# Patient Record
Sex: FEMALE | HISPANIC OR LATINO | Employment: UNEMPLOYED | URBAN - METROPOLITAN AREA
[De-identification: names, ages, dates, MRNs, and addresses within clinical notes are randomized per-mention and may not be internally consistent; named-entity substitution may affect disease eponyms.]

---

## 2017-01-01 ENCOUNTER — APPOINTMENT (OUTPATIENT)
Dept: OCCUPATIONAL THERAPY | Facility: CLINIC | Age: 27
DRG: 292 | End: 2017-01-01
Attending: NURSE PRACTITIONER
Payer: COMMERCIAL

## 2017-01-02 ENCOUNTER — APPOINTMENT (OUTPATIENT)
Dept: OCCUPATIONAL THERAPY | Facility: CLINIC | Age: 27
DRG: 292 | End: 2017-01-02
Attending: INTERNAL MEDICINE
Payer: COMMERCIAL

## 2017-01-03 ENCOUNTER — TELEPHONE (OUTPATIENT)
Dept: PHARMACY | Facility: OTHER | Age: 27
End: 2017-01-03

## 2017-01-03 ENCOUNTER — CARE COORDINATION (OUTPATIENT)
Dept: CARE COORDINATION | Facility: CLINIC | Age: 27
End: 2017-01-03

## 2017-01-03 NOTE — TELEPHONE ENCOUNTER
MTM referral from: Transitions of Care (recent hospital discharge or ED visit)    MTM referral outreach attempt #1 on January 3, 2017 at 11:37 AM      Outcome: Left Message    Jessica Espinoza MTM Coordinator

## 2017-01-04 ENCOUNTER — PRE VISIT (OUTPATIENT)
Dept: CARDIOLOGY | Facility: CLINIC | Age: 27
End: 2017-01-04

## 2017-01-04 ENCOUNTER — CARE COORDINATION (OUTPATIENT)
Dept: CARDIOLOGY | Facility: CLINIC | Age: 27
End: 2017-01-04

## 2017-01-04 DIAGNOSIS — I50.22 CHRONIC SYSTOLIC HEART FAILURE (H): Primary | ICD-10-CM

## 2017-01-04 NOTE — TELEPHONE ENCOUNTER
MTM referral from: Transitions of Care (recent hospital discharge or ED visit)    MTM referral outreach attempt #2 on January 4, 2017 at 11:21 AM      Outcome: Patient not reachable after several attempts, will route to MTM Pharmacist/Provider as an FYI. Thank you for the referral.    Jessica Espinoza MTM Coordinator

## 2017-01-04 NOTE — PROGRESS NOTES
"I called Anne this morning to follow-up with her post-hospitalization discharged from [facility] 1/2/16.     I spoke with Anne's mom and caregiver Maritza. Maritza states she is doing well, weight is stable at 153.6lbs. Denies SOB, but states she's \"tired and crabby.\" We scheduled a Return CORE appt for Anne with labs.     Medication Review:  I reviewed all Anne's discharge medications with Maritza and she had no questions regarding her medications.     Follow-up appointment review:  I reviewed Anne Oas follow-up appointments with Maritza and she verbalized understanding. I reviewed the CORE clinic's phone number and to call with any increase of SOB, increased edema or swelling, and weight gain. Maritza verbalizes understanding and agrees with plan of care. Natalie Gaston RN    "

## 2017-01-05 ENCOUNTER — CARE COORDINATION (OUTPATIENT)
Dept: CARDIOLOGY | Facility: CLINIC | Age: 27
End: 2017-01-05

## 2017-01-05 DIAGNOSIS — I26.99 PE (PULMONARY THROMBOEMBOLISM) (H): Primary | ICD-10-CM

## 2017-01-05 NOTE — PROGRESS NOTES
Mother calling in and requesting an INR be done prior to visit in cardiology tomorrow. Order placed.

## 2017-01-06 ENCOUNTER — HOSPITAL ENCOUNTER (INPATIENT)
Facility: CLINIC | Age: 27
LOS: 14 days | Discharge: ANOTHER HOSPITAL | DRG: 286 | End: 2017-01-20
Attending: INTERNAL MEDICINE | Admitting: INTERNAL MEDICINE
Payer: COMMERCIAL

## 2017-01-06 ENCOUNTER — HOSPITAL ENCOUNTER (EMERGENCY)
Facility: CLINIC | Age: 27
End: 2017-01-06
Payer: COMMERCIAL

## 2017-01-06 ENCOUNTER — DOCUMENTATION ONLY (OUTPATIENT)
Dept: CARDIOLOGY | Facility: CLINIC | Age: 27
End: 2017-01-06

## 2017-01-06 ENCOUNTER — OFFICE VISIT (OUTPATIENT)
Dept: CARDIOLOGY | Facility: CLINIC | Age: 27
End: 2017-01-06
Attending: NURSE PRACTITIONER
Payer: COMMERCIAL

## 2017-01-06 ENCOUNTER — APPOINTMENT (OUTPATIENT)
Dept: GENERAL RADIOLOGY | Facility: CLINIC | Age: 27
DRG: 286 | End: 2017-01-06
Attending: INTERNAL MEDICINE
Payer: COMMERCIAL

## 2017-01-06 VITALS
BODY MASS INDEX: 28.17 KG/M2 | SYSTOLIC BLOOD PRESSURE: 106 MMHG | HEART RATE: 103 BPM | WEIGHT: 159 LBS | DIASTOLIC BLOOD PRESSURE: 73 MMHG | HEIGHT: 63 IN | OXYGEN SATURATION: 99 %

## 2017-01-06 DIAGNOSIS — I50.22 CHRONIC SYSTOLIC HEART FAILURE (H): ICD-10-CM

## 2017-01-06 DIAGNOSIS — K21.9 GASTROESOPHAGEAL REFLUX DISEASE, ESOPHAGITIS PRESENCE NOT SPECIFIED: ICD-10-CM

## 2017-01-06 DIAGNOSIS — I26.99 PE (PULMONARY THROMBOEMBOLISM) (H): ICD-10-CM

## 2017-01-06 DIAGNOSIS — I50.22 CHRONIC SYSTOLIC HEART FAILURE (H): Primary | ICD-10-CM

## 2017-01-06 DIAGNOSIS — I42.8 NONISCHEMIC CARDIOMYOPATHY (H): ICD-10-CM

## 2017-01-06 DIAGNOSIS — M62.838 MUSCLE SPASM: ICD-10-CM

## 2017-01-06 DIAGNOSIS — R57.0 CARDIOGENIC SHOCK (H): Primary | ICD-10-CM

## 2017-01-06 LAB
ALBUMIN SERPL-MCNC: 3.9 G/DL (ref 3.4–5)
ALBUMIN SERPL-MCNC: 3.9 G/DL (ref 3.4–5)
ALP SERPL-CCNC: 73 U/L (ref 40–150)
ALP SERPL-CCNC: 74 U/L (ref 40–150)
ALT SERPL W P-5'-P-CCNC: 202 U/L (ref 0–50)
ALT SERPL W P-5'-P-CCNC: 213 U/L (ref 0–50)
ANION GAP SERPL CALCULATED.3IONS-SCNC: 10 MMOL/L (ref 3–14)
ANION GAP SERPL CALCULATED.3IONS-SCNC: 12 MMOL/L (ref 3–14)
AST SERPL W P-5'-P-CCNC: 191 U/L (ref 0–45)
AST SERPL W P-5'-P-CCNC: 203 U/L (ref 0–45)
BASE EXCESS BLDA CALC-SCNC: 0 MMOL/L
BILIRUB DIRECT SERPL-MCNC: 0.3 MG/DL (ref 0–0.2)
BILIRUB DIRECT SERPL-MCNC: 0.4 MG/DL (ref 0–0.2)
BILIRUB SERPL-MCNC: 0.8 MG/DL (ref 0.2–1.3)
BILIRUB SERPL-MCNC: 0.9 MG/DL (ref 0.2–1.3)
BUN SERPL-MCNC: 28 MG/DL (ref 7–30)
BUN SERPL-MCNC: 29 MG/DL (ref 7–30)
CALCIUM SERPL-MCNC: 9 MG/DL (ref 8.5–10.1)
CALCIUM SERPL-MCNC: 9.3 MG/DL (ref 8.5–10.1)
CHLORIDE SERPL-SCNC: 100 MMOL/L (ref 94–109)
CHLORIDE SERPL-SCNC: 99 MMOL/L (ref 94–109)
CO2 SERPL-SCNC: 22 MMOL/L (ref 20–32)
CO2 SERPL-SCNC: 24 MMOL/L (ref 20–32)
CREAT SERPL-MCNC: 1.29 MG/DL (ref 0.52–1.04)
CREAT SERPL-MCNC: 1.34 MG/DL (ref 0.52–1.04)
ERYTHROCYTE [DISTWIDTH] IN BLOOD BY AUTOMATED COUNT: 15.9 % (ref 10–15)
GFR SERPL CREATININE-BSD FRML MDRD: 48 ML/MIN/1.7M2
GFR SERPL CREATININE-BSD FRML MDRD: 51 ML/MIN/1.7M2
GLUCOSE SERPL-MCNC: 101 MG/DL (ref 70–99)
GLUCOSE SERPL-MCNC: 112 MG/DL (ref 70–99)
HCO3 BLD-SCNC: 24 MMOL/L (ref 21–28)
HCT VFR BLD AUTO: 37.8 % (ref 35–47)
HGB BLD-MCNC: 11.8 G/DL (ref 11.7–15.7)
INR PPP: 3.32 (ref 0.86–1.14)
LACTATE BLD-SCNC: 1.4 MMOL/L (ref 0.7–2.1)
LIPASE SERPL-CCNC: 49 U/L (ref 73–393)
MAGNESIUM SERPL-MCNC: 2 MG/DL (ref 1.6–2.3)
MAGNESIUM SERPL-MCNC: 2.1 MG/DL (ref 1.6–2.3)
MCH RBC QN AUTO: 28.9 PG (ref 26.5–33)
MCHC RBC AUTO-ENTMCNC: 31.2 G/DL (ref 31.5–36.5)
MCV RBC AUTO: 92 FL (ref 78–100)
NT-PROBNP SERPL-MCNC: 9836 PG/ML (ref 0–450)
O2/TOTAL GAS SETTING VFR VENT: 21 %
PCO2 BLD: 34 MM HG (ref 35–45)
PH BLD: 7.46 PH (ref 7.35–7.45)
PLATELET # BLD AUTO: 245 10E9/L (ref 150–450)
PO2 BLD: 90 MM HG (ref 80–105)
POTASSIUM SERPL-SCNC: 4.4 MMOL/L (ref 3.4–5.3)
POTASSIUM SERPL-SCNC: 4.5 MMOL/L (ref 3.4–5.3)
PROT SERPL-MCNC: 7.2 G/DL (ref 6.8–8.8)
PROT SERPL-MCNC: 7.5 G/DL (ref 6.8–8.8)
RBC # BLD AUTO: 4.09 10E12/L (ref 3.8–5.2)
SODIUM SERPL-SCNC: 134 MMOL/L (ref 133–144)
SODIUM SERPL-SCNC: 135 MMOL/L (ref 133–144)
WBC # BLD AUTO: 7.3 10E9/L (ref 4–11)

## 2017-01-06 PROCEDURE — 85610 PROTHROMBIN TIME: CPT | Performed by: NURSE PRACTITIONER

## 2017-01-06 PROCEDURE — 83880 ASSAY OF NATRIURETIC PEPTIDE: CPT | Performed by: INTERNAL MEDICINE

## 2017-01-06 PROCEDURE — 25000125 ZZHC RX 250: Mod: ZF

## 2017-01-06 PROCEDURE — 83690 ASSAY OF LIPASE: CPT | Performed by: INTERNAL MEDICINE

## 2017-01-06 PROCEDURE — 83605 ASSAY OF LACTIC ACID: CPT

## 2017-01-06 PROCEDURE — 80048 BASIC METABOLIC PNL TOTAL CA: CPT | Performed by: INTERNAL MEDICINE

## 2017-01-06 PROCEDURE — 80076 HEPATIC FUNCTION PANEL: CPT | Performed by: INTERNAL MEDICINE

## 2017-01-06 PROCEDURE — 25000125 ZZHC RX 250: Performed by: INTERNAL MEDICINE

## 2017-01-06 PROCEDURE — 85027 COMPLETE CBC AUTOMATED: CPT | Performed by: INTERNAL MEDICINE

## 2017-01-06 PROCEDURE — 99291 CRITICAL CARE FIRST HOUR: CPT | Mod: GC | Performed by: INTERNAL MEDICINE

## 2017-01-06 PROCEDURE — 99215 OFFICE O/P EST HI 40 MIN: CPT | Mod: ZF

## 2017-01-06 PROCEDURE — 3E033XZ INTRODUCTION OF VASOPRESSOR INTO PERIPHERAL VEIN, PERCUTANEOUS APPROACH: ICD-10-PCS | Performed by: INTERNAL MEDICINE

## 2017-01-06 PROCEDURE — 25000132 ZZH RX MED GY IP 250 OP 250 PS 637: Performed by: INTERNAL MEDICINE

## 2017-01-06 PROCEDURE — 36415 COLL VENOUS BLD VENIPUNCTURE: CPT | Performed by: NURSE PRACTITIONER

## 2017-01-06 PROCEDURE — 71010 XR CHEST PORT 1 VW: CPT

## 2017-01-06 PROCEDURE — 82803 BLOOD GASES ANY COMBINATION: CPT | Performed by: INTERNAL MEDICINE

## 2017-01-06 PROCEDURE — 83735 ASSAY OF MAGNESIUM: CPT | Performed by: INTERNAL MEDICINE

## 2017-01-06 PROCEDURE — 36415 COLL VENOUS BLD VENIPUNCTURE: CPT | Performed by: INTERNAL MEDICINE

## 2017-01-06 PROCEDURE — 96374 THER/PROPH/DIAG INJ IV PUSH: CPT | Mod: ZF

## 2017-01-06 PROCEDURE — 36415 COLL VENOUS BLD VENIPUNCTURE: CPT

## 2017-01-06 PROCEDURE — 40000275 ZZH STATISTIC RCP TIME EA 10 MIN

## 2017-01-06 PROCEDURE — 36600 WITHDRAWAL OF ARTERIAL BLOOD: CPT

## 2017-01-06 PROCEDURE — 99207 ZZC CHGS TRANSFERRED TO HOSPITAL: CPT | Mod: ZP | Performed by: NURSE PRACTITIONER

## 2017-01-06 PROCEDURE — 80048 BASIC METABOLIC PNL TOTAL CA: CPT | Performed by: NURSE PRACTITIONER

## 2017-01-06 PROCEDURE — 21400006 ZZH R&B CCU INTERMEDIATE UMMC

## 2017-01-06 PROCEDURE — 40000556 ZZH STATISTIC PERIPHERAL IV START W US GUIDANCE

## 2017-01-06 RX ORDER — SODIUM CHLORIDE 9 MG/ML
INJECTION, SOLUTION INTRAVENOUS
Status: DISCONTINUED
Start: 2017-01-06 | End: 2017-01-06 | Stop reason: HOSPADM

## 2017-01-06 RX ORDER — POTASSIUM CHLORIDE 7.45 MG/ML
10 INJECTION INTRAVENOUS
Status: DISCONTINUED | OUTPATIENT
Start: 2017-01-06 | End: 2017-01-16

## 2017-01-06 RX ORDER — WARFARIN SODIUM 1 MG/1
1 TABLET ORAL
Status: COMPLETED | OUTPATIENT
Start: 2017-01-06 | End: 2017-01-06

## 2017-01-06 RX ORDER — FERROUS SULFATE 325(65) MG
325 TABLET ORAL 2 TIMES DAILY
Status: DISCONTINUED | OUTPATIENT
Start: 2017-01-06 | End: 2017-01-20 | Stop reason: HOSPADM

## 2017-01-06 RX ORDER — ACETAMINOPHEN 325 MG/1
650 TABLET ORAL EVERY 4 HOURS PRN
Status: DISCONTINUED | OUTPATIENT
Start: 2017-01-06 | End: 2017-01-20 | Stop reason: HOSPADM

## 2017-01-06 RX ORDER — ONDANSETRON 4 MG/1
4 TABLET, ORALLY DISINTEGRATING ORAL EVERY 6 HOURS PRN
Status: DISCONTINUED | OUTPATIENT
Start: 2017-01-06 | End: 2017-01-20 | Stop reason: HOSPADM

## 2017-01-06 RX ORDER — POTASSIUM CHLORIDE 750 MG/1
20-40 TABLET, EXTENDED RELEASE ORAL
Status: DISCONTINUED | OUTPATIENT
Start: 2017-01-06 | End: 2017-01-16

## 2017-01-06 RX ORDER — SPIRONOLACTONE 25 MG/1
25 TABLET ORAL DAILY
Status: DISCONTINUED | OUTPATIENT
Start: 2017-01-07 | End: 2017-01-07

## 2017-01-06 RX ORDER — BUSPIRONE HYDROCHLORIDE 15 MG/1
30 TABLET ORAL 3 TIMES DAILY
Status: DISCONTINUED | OUTPATIENT
Start: 2017-01-06 | End: 2017-01-20 | Stop reason: HOSPADM

## 2017-01-06 RX ORDER — MAGNESIUM SULFATE HEPTAHYDRATE 40 MG/ML
4 INJECTION, SOLUTION INTRAVENOUS EVERY 4 HOURS PRN
Status: DISCONTINUED | OUTPATIENT
Start: 2017-01-06 | End: 2017-01-20 | Stop reason: HOSPADM

## 2017-01-06 RX ORDER — POTASSIUM CHLORIDE 750 MG/1
40 TABLET, EXTENDED RELEASE ORAL 2 TIMES DAILY
Status: DISCONTINUED | OUTPATIENT
Start: 2017-01-06 | End: 2017-01-07

## 2017-01-06 RX ORDER — POTASSIUM CHLORIDE 1.5 G/1.58G
20-40 POWDER, FOR SOLUTION ORAL
Status: DISCONTINUED | OUTPATIENT
Start: 2017-01-06 | End: 2017-01-16

## 2017-01-06 RX ORDER — DIGOXIN 125 MCG
125 TABLET ORAL DAILY
Status: DISCONTINUED | OUTPATIENT
Start: 2017-01-06 | End: 2017-01-13

## 2017-01-06 RX ORDER — ONDANSETRON 2 MG/ML
4 INJECTION INTRAMUSCULAR; INTRAVENOUS EVERY 6 HOURS PRN
Status: DISCONTINUED | OUTPATIENT
Start: 2017-01-06 | End: 2017-01-20 | Stop reason: HOSPADM

## 2017-01-06 RX ORDER — POLYETHYLENE GLYCOL 3350 17 G/17G
17 POWDER, FOR SOLUTION ORAL DAILY
Status: DISCONTINUED | OUTPATIENT
Start: 2017-01-06 | End: 2017-01-20 | Stop reason: HOSPADM

## 2017-01-06 RX ORDER — POTASSIUM CHLORIDE 1500 MG/1
20 TABLET, EXTENDED RELEASE ORAL 2 TIMES DAILY
Status: DISCONTINUED | OUTPATIENT
Start: 2017-01-06 | End: 2017-01-06

## 2017-01-06 RX ORDER — LANOLIN ALCOHOL/MO/W.PET/CERES
3 CREAM (GRAM) TOPICAL
Status: DISCONTINUED | OUTPATIENT
Start: 2017-01-06 | End: 2017-01-20 | Stop reason: HOSPADM

## 2017-01-06 RX ORDER — FUROSEMIDE 10 MG/ML
160 INJECTION INTRAMUSCULAR; INTRAVENOUS ONCE
Qty: 16 ML | Refills: 0 | Status: ON HOLD | OUTPATIENT
Start: 2017-01-06 | End: 2017-01-20

## 2017-01-06 RX ORDER — HYDROXYZINE HYDROCHLORIDE 50 MG/1
50 TABLET, FILM COATED ORAL 3 TIMES DAILY
Status: DISCONTINUED | OUTPATIENT
Start: 2017-01-06 | End: 2017-01-20 | Stop reason: HOSPADM

## 2017-01-06 RX ORDER — LIDOCAINE 40 MG/G
CREAM TOPICAL
Status: DISCONTINUED | OUTPATIENT
Start: 2017-01-06 | End: 2017-01-20 | Stop reason: HOSPADM

## 2017-01-06 RX ORDER — DOBUTAMINE HCL IN DEXTROSE 5 % 500MG/250
3 INTRAVENOUS SOLUTION INTRAVENOUS CONTINUOUS
Status: DISCONTINUED | OUTPATIENT
Start: 2017-01-06 | End: 2017-01-15

## 2017-01-06 RX ORDER — DULOXETIN HYDROCHLORIDE 30 MG/1
90 CAPSULE, DELAYED RELEASE ORAL DAILY
Status: DISCONTINUED | OUTPATIENT
Start: 2017-01-06 | End: 2017-01-15

## 2017-01-06 RX ORDER — ACETAMINOPHEN 650 MG/1
650 SUPPOSITORY RECTAL EVERY 4 HOURS PRN
Status: DISCONTINUED | OUTPATIENT
Start: 2017-01-06 | End: 2017-01-20 | Stop reason: HOSPADM

## 2017-01-06 RX ORDER — POTASSIUM CHLORIDE 29.8 MG/ML
20 INJECTION INTRAVENOUS
Status: DISCONTINUED | OUTPATIENT
Start: 2017-01-06 | End: 2017-01-16

## 2017-01-06 RX ADMIN — LIDOCAINE HYDROCHLORIDE 30 ML: 20 SOLUTION ORAL; TOPICAL at 22:08

## 2017-01-06 RX ADMIN — ONDANSETRON 4 MG: 2 INJECTION INTRAMUSCULAR; INTRAVENOUS at 15:03

## 2017-01-06 RX ADMIN — WARFARIN SODIUM 1 MG: 1 TABLET ORAL at 18:19

## 2017-01-06 RX ADMIN — HYDROXYZINE HYDROCHLORIDE 50 MG: 50 TABLET, FILM COATED ORAL at 19:45

## 2017-01-06 RX ADMIN — ACETAMINOPHEN 650 MG: 325 TABLET, FILM COATED ORAL at 20:28

## 2017-01-06 RX ADMIN — BUSPIRONE HYDROCHLORIDE 30 MG: 15 TABLET ORAL at 19:45

## 2017-01-06 RX ADMIN — POTASSIUM CHLORIDE 40 MEQ: 750 TABLET, EXTENDED RELEASE ORAL at 19:46

## 2017-01-06 RX ADMIN — IRON 325 MG: 65 TABLET ORAL at 19:45

## 2017-01-06 RX ADMIN — BUSPIRONE HYDROCHLORIDE 30 MG: 15 TABLET ORAL at 14:42

## 2017-01-06 RX ADMIN — HYDROXYZINE HYDROCHLORIDE 50 MG: 50 TABLET, FILM COATED ORAL at 14:42

## 2017-01-06 RX ADMIN — DOBUTAMINE 5 MCG/KG/MIN: 12.5 INJECTION, SOLUTION, CONCENTRATE INTRAVENOUS at 14:50

## 2017-01-06 RX ADMIN — DOBUTAMINE 5 MCG/KG/MIN: 12.5 INJECTION, SOLUTION, CONCENTRATE INTRAVENOUS at 17:04

## 2017-01-06 RX ADMIN — ONDANSETRON 4 MG: 4 TABLET, ORALLY DISINTEGRATING ORAL at 22:20

## 2017-01-06 ASSESSMENT — ENCOUNTER SYMPTOMS
BLOOD IN STOOL: 0
WEIGHT LOSS: 1
SPEECH CHANGE: 0
SEIZURES: 0
POSTURAL DYSPNEA: 1
HALLUCINATIONS: 0
RECTAL PAIN: 0
HYPERTENSION: 0
LEG SWELLING: 0
NAUSEA: 1
SPUTUM PRODUCTION: 0
JAUNDICE: 0
CONSTIPATION: 0
DEPRESSION: 1
EXERCISE INTOLERANCE: 0
COUGH DISTURBING SLEEP: 0
DISTURBANCES IN COORDINATION: 0
SHORTNESS OF BREATH: 1
BLOATING: 1
WEAKNESS: 1
BOWEL INCONTINENCE: 0
INCREASED ENERGY: 1
DYSURIA: 0
HEARTBURN: 0
WEIGHT GAIN: 1
HEMOPTYSIS: 0
DIZZINESS: 1
TACHYCARDIA: 0
RECTAL BLEEDING: 0
FATIGUE: 1
DECREASED CONCENTRATION: 1
INSOMNIA: 0
SNORES LOUDLY: 0
SLEEP DISTURBANCES DUE TO BREATHING: 0
POLYDIPSIA: 1
HEADACHES: 0
HYPOTENSION: 1
RESPIRATORY PAIN: 1
PALPITATIONS: 0
FLANK PAIN: 0
PANIC: 0
POLYPHAGIA: 0
DECREASED APPETITE: 0
CLAUDICATION: 0
TREMORS: 0
LEG PAIN: 0
SYNCOPE: 1
FEVER: 0
PARALYSIS: 0
WHEEZING: 0
DIARRHEA: 0
ALTERED TEMPERATURE REGULATION: 1
VOMITING: 1
ABDOMINAL PAIN: 1
NERVOUS/ANXIOUS: 1
ORTHOPNEA: 0
DIFFICULTY URINATING: 1
LIGHT-HEADEDNESS: 1
TINGLING: 1
MEMORY LOSS: 0
COUGH: 0
LOSS OF CONSCIOUSNESS: 1
DYSPNEA ON EXERTION: 1
NIGHT SWEATS: 1
CHILLS: 1
NUMBNESS: 0
HEMATURIA: 0

## 2017-01-06 ASSESSMENT — PAIN SCALES - GENERAL: PAINLEVEL: MILD PAIN (3)

## 2017-01-06 ASSESSMENT — PAIN DESCRIPTION - DESCRIPTORS
DESCRIPTORS: PRESSURE
DESCRIPTORS: CONSTANT;PRESSURE

## 2017-01-06 NOTE — LETTER
Transition Communication Hand-off for Care Transitions to Next Level of Care Provider    Name: Anne Eugene  MRN #: [id number]  Primary Care Provider: MINERVA RICHMOND     Primary Clinic: [address] [address]     Reason for Hospitalization:  Acute on chronic systolic congestive heart failure (H) [I50.23]  Admit Date/Time: 1/6/2017  1:10 PM  Discharge Date:  1/20/17  Payor Source: Payor: [employer] / Plan: [employer] PMAP / Product Type: HMO /            Discharge Plan:  Transferred to [facility] for 2nd opinion for LVAD eval.             Alonso Teixeira RN, BSN  4A and 4E/ ICU  Care Coordinator  Phone: [phone]  Pager: [phone]        AVS/Discharge Summary is the source of truth; this is a helpful guide for improved communication of patient story

## 2017-01-06 NOTE — PATIENT INSTRUCTIONS
You were seen today in the Cardiovascular Clinic at the [facility].     Cardiology Providers you saw during your visit: Terri Garsia NP        1.  We will be giving you 160mg IV lasix in clinic today thru your PICC line.  2.  Please take potassium 60mEqs three times today.  Then return to potassium 40mEqs three times daily.      If you weight is still greater than or equal to 155lbs this weekend  - please call the on On-Call Cardiologist in the Hospital    If your weight is still greater than or equal to 155lbs on Monday then take Torsemide 100mg twice daily and potassium 60mEqs three times daily.  If your weight persists despite this dosage increase then please call us.    If your weight is less than 155lbs then continue torsemide 80mg twice daily and potassium 40mEqs three times daily.         Results for MARTINA MCQUEEN (MRN [id number]) as of 1/6/2017 11:26   Ref. Range 1/6/2017 10:11   Sodium Latest Ref Range: 133-144 mmol/L 135   Potassium Latest Ref Range: 3.4-5.3 mmol/L 4.5   Chloride Latest Ref Range:  mmol/L 100   Carbon Dioxide Latest Ref Range: 20-32 mmol/L 22   Urea Nitrogen Latest Ref Range: 7-30 mg/dL 29   Creatinine Latest Ref Range: 0.52-1.04 mg/dL 1.34 (H)   GFR Estimate Latest Ref Range: >60 mL/min/1.7m2 48 (L)   GFR Estimate If Black Latest Ref Range: >60 mL/min/1.7m2 59 (L)   Calcium Latest Ref Range: 8.5-10.1 mg/dL 9.3   Anion Gap Latest Ref Range: 3-14 mmol/L 12   Glucose Latest Ref Range: 70-99 mg/dL 112 (H)   INR Latest Ref Range: 0.86-1.14  3.32 (H)         Please limit your fluid intake to 2 L (64 ounces) daily.  2 Liters a day = 8.5 cups, or 72 ounces.  Please limit your salt intake to 2 grams a day or less.    If you gain 2# in 24 hours or 5# in one week call Natalie Gaston RN so we can adjust your medications as needed over the phone.    Please feel free to call me with any questions or concerns.      Natalie Gaston RN BSN CHFN  [facility]  "[facility]  Cardiology Care Coordinator-Heart Failure Clinic    Questions and [phone].   First press #1 for the University and then press #3 for \"Medical Questions\" to reach us Cardiology Nurses.     On Call Cardiologist for after hours or on weekends: [phone]   option #4 and ask to speak to the on-call Cardiologist. Inform them you are a CORE/heart failure patient at the [place].        If you need a medication refill please contact your pharmacy.  Please allow 3 business days for your refill to be completed.  _______________________________________________________  C.O.R.E[facility] CLINIC Cardiomyopathy, Optimization, Rehabilitation, Education   The C.O.R.E. CLINIC is a heart failure specialty clinic within the [facility] where you will work with specialized nurse practioners dedicated to helping patients with heart failure carefully adjust medications, receive education, and learn who and when to call if symptoms develop. They specialize in helping you better understand your condition, slow the progression of your disease, improve the length and quality of your life, help you detect future heart problems before they become life threatening, and avoid hospitalizations.  As always, thank you for trusting us with your health care needs!       "

## 2017-01-06 NOTE — NURSING NOTE
Chief Complaint   Patient presents with     Follow Up For     Return CORE appt; 26yr old female with a h/o chronic systolic heart failure presenting post hospitalization for follow up and labs prior

## 2017-01-06 NOTE — IP AVS SNAPSHOT
` ` Patient Information     Patient Name Sex     Anne Eugene ([id number]) Female 1990       Room Bed    [facility] 4415-08      Patient Demographics     Address Phone    [address] [place] [address] [phone] (Home) *Preferred*      Patient Ethnicity & Race     Ethnic Group Patient Race     /  White      Emergency Contact(s)     Name Relation Home Work Mobile    Maritza Eugene Mother   [phone]      Documents on File        Status Date Received Description       Documents for the Patient    Consent for EHR Access Received 16     Insurance Card       External Medication Information Consent       Patient ID       [facility] Specified Other       Consent for Services/Privacy Notice - Hospital/Clinic Received 16     Privacy Notice - [place] Received 16     HIM BUDDY Authorization  16     HIM BUDDY Authorization  16 [facility] - 16    HIM BUDDY Authorization - File Only  16 AUTHORIZATION FOR RELEASE OF PROTECTED HEALTH INFORMATION    Consent for Services - [facility]       Consent for Services/Privacy Notice - Hospital/Clinic-Esign       HIM BUDDY Authorization  10/17/16 [facility]    HIM BUDDY Authorization  16 [employer]    HIM BUDDY Authorization  16 [employer]    HIM BUDDY Authorization  16 [place] [facility]    HIM BUDDY Authorization  16 [facility]    HIM BUDDY Authorization  16 [facility]     HIM BUDDY Authorization  16 [employer]    HIM BUDDY Authorization  17 [place] [facility]    HIM UBDDY Authorization - File Only  17 [facility]    HIM BUDDY Authorization - File Only  17 [facility]       Documents for the Encounter    Monitoring Device Output  17 INITIAL MONITORING STRIPS    Monitoring Device Output  17 MONITORING STRIPS SHEET 2    Monitoring Device Output  17 MONITORING STRIPS SHEET 2    Monitoring Device Output  17 SAVED EVENT  REPORT      Admission Information     Attending Provider Admitting Provider Admission Type Admission Date/Time    Elvira Storey MD Kamdar, Forum D, MD Urgent 01/06/17  1310    Discharge Date Hospital Service Auth/Cert Status Service Area     Cardiology Incomplete [facility]    Unit Room/Bed Admission Status    [facility] MEDICAL ICU 4415/4415-01 Admission (Confirmed)            Admission     Complaint    Acute heart failure, Heart failure (H)      Hospital Account     Name Acct ID Class Status Primary Coverage    Anne Eugene [id number] Inpatient Open [employer] - [employer] PMAP            Guarantor Account (for Hospital Account #[id number])     Name Relation to Pt Service Area Active? Acct Type    Anne Eugene Self FCS Yes Personal/Family    Address Phone          [address] [place] [address] [phone](H)              Coverage Information (for Hospital Account #[id number])     F/O Payor/Plan Precert #    [employer] PMAP     Subscriber Subscriber #    Anne Eugene [id number]    Address Phone    ATTN CLAIMS PROCESSING  [address] [address]  [place] [id number] [phone]

## 2017-01-06 NOTE — IP AVS SNAPSHOT
` `           UNIT 4C [facility] BANK: [phone]            Medication Administration Report for Anne Eugene as of 01/20/17 1239   Legend:    Given Hold Not Given Due Canceled Entry Other Actions    Time Time (Time) Time  Time-Action       Inactive    Active    Linked        Medications 01/14/17 01/15/17 01/16/17 01/17/17 01/18/17 01/19/17 01/20/17    acetaminophen (TYLENOL) Suppository 650 mg  Dose: 650 mg Freq: EVERY 4 HOURS PRN Route: RE  PRN Reason: mild pain  Start: 01/06/17 1339   Admin Instructions: Not to exceed 4 grams/day.  Maximum acetaminophen dose from all sources = 75 mg/kg/day not to exceed 4 grams/day.               acetaminophen (TYLENOL) tablet 650 mg  Dose: 650 mg Freq: EVERY 4 HOURS PRN Route: PO  PRN Reason: mild pain  Start: 01/06/17 1339   Admin Instructions: Maximum acetaminophen dose from all sources = 75 mg/kg/day not to exceed 4 grams/day.       1126 (650 mg)-Given       2353 (650 mg)-Given         0108 (650 mg)-Given       1041 (650 mg)-Given [C]       1908 (650 mg)-Given        0341 (650 mg)-Given       1024 (650 mg)-Given       1626 (650 mg)-Given            bumetanide (BUMEX) 0.25 mg/mL infusion  Rate: 6 mL/hr Freq: CONTINUOUS Route: IV  Last Dose: 1.5 mg/hr (01/20/17 1100)  Start: 01/18/17 1145        1203 (1 mg/hr)-New Bag       1300 (1 mg/hr)-Rate/Dose Verify       1400 (1 mg/hr)-Rate/Dose Verify       1500 (1 mg/hr)-Rate/Dose Verify       1600 (1 mg/hr)-Rate/Dose Verify       1700-Stopped [C]       2321 (1 mg/hr)-Restarted        0000 (1 mg/hr)-Rate/Dose Verify       0100 (1 mg/hr)-Rate/Dose Verify       0200 (1 mg/hr)-Rate/Dose Verify       0300 (1 mg/hr)-Rate/Dose Verify       0400 (1 mg/hr)-Rate/Dose Verify       0500 (1 mg/hr)-Rate/Dose Verify       0600 (1 mg/hr)-Rate/Dose Verify       0700 (1 mg/hr)-Rate/Dose Verify       0800 (1 mg/hr)-Rate/Dose Verify       0900 (1 mg/hr)-Rate/Dose Verify       1000 (1 mg/hr)-Rate/Dose Verify       1100 (1 mg/hr)-Rate/Dose Verify        1200 (1 mg/hr)-Rate/Dose Verify       1300 (1 mg/hr)-Rate/Dose Verify       1400 (1 mg/hr)-Rate/Dose Verify       1500 (1 mg/hr)-Rate/Dose Verify       1501 (1 mg/hr)-New Bag       1600 (1 mg/hr)-Rate/Dose Verify       1632 (1.5 mg/hr)-Rate/Dose Change       1700 (1.5 mg/hr)-Rate/Dose Verify       1800 (1.5 mg/hr)-Rate/Dose Verify       1900 (1.5 mg/hr)-Rate/Dose Verify       2000 (1.5 mg/hr)-Rate/Dose Verify       2100 (1.5 mg/hr)-Rate/Dose Verify       2200 (1.5 mg/hr)-Rate/Dose Verify       2300 (1.5 mg/hr)-Rate/Dose Verify        0000 (1.5 mg/hr)-Rate/Dose Verify       0100 (1.5 mg/hr)-Rate/Dose Verify       0200 (1.5 mg/hr)-Rate/Dose Verify       0300 (1.5 mg/hr)-Rate/Dose Verify       0400 (1.5 mg/hr)-Rate/Dose Verify       0500 (1.5 mg/hr)-Rate/Dose Verify       0600 (1.5 mg/hr)-Rate/Dose Verify       0700 (1.5 mg/hr)-Rate/Dose Verify       0800 (1.5 mg/hr)-Rate/Dose Verify       0843 (1.5 mg/hr)-New Bag       0900 (1.5 mg/hr)-Rate/Dose Verify       1000 (1.5 mg/hr)-Rate/Dose Verify       1100 (1.5 mg/hr)-Rate/Dose Verify           busPIRone (BUSPAR) tablet 30 mg  Dose: 30 mg Freq: 3 TIMES DAILY Route: PO  Start: 01/06/17 1400    0841 (30 mg)-Given       1554 (30 mg)-Given [C]       1930 (30 mg)-Given        0854 (30 mg)-Given       1425 (30 mg)-Given       2043 (30 mg)-Given        0933 (30 mg)-Given [C]       1317 (30 mg)-Given       2025 (30 mg)-Given        0736 (30 mg)-Given       1332 (30 mg)-Given       1954 (30 mg)-Given        1006 (30 mg)-Given       1634 (30 mg)-Given       1935 (30 mg)-Given        0748 (30 mg)-Given       1443 (30 mg)-Given       1937 (30 mg)-Given        0839 (30 mg)-Given       [ ] 1400       [ ] 2000           cyclobenzaprine (FLEXERIL) tablet 10 mg  Dose: 10 mg Freq: 3 TIMES DAILY PRN Route: PO  PRN Reason: muscle spasms  Start: 01/19/17 1542         1626 (10 mg)-Given            digoxin (LANOXIN) half-tab 62.5 mcg  Dose: 62.5 mcg Freq: DAILY Route: PO  Start: 01/14/17 0800     0842 (62.5 mcg)-Given        0856 (62.5 mcg)-Given        0933 (62.5 mcg)-Given [C]        0737 (62.5 mcg)-Given        1007 (62.5 mcg)-Given        1024 (62.5 mcg)-Given        0841 (62.5 mcg)-Given           DOBUTamine (DOBUTREX) 1,000 mg in D5W 250 mL infusion (adult max conc)  Rate: 8.2 mL/hr Freq: CONTINUOUS Route: IV  Last Dose: 7.5 mcg/kg/min (01/20/17 1100)  Start: 01/16/17 0945      0936 (5 mcg/kg/min)-New Bag       1000 (5 mcg/kg/min)-Rate/Dose Verify       1100 (5 mcg/kg/min)-Rate/Dose Verify       1200 (5 mcg/kg/min)-Rate/Dose Verify       1300 (5 mcg/kg/min)-Rate/Dose Verify       1400 (5 mcg/kg/min)-Rate/Dose Verify       1500 (5 mcg/kg/min)-Rate/Dose Verify       1600 (5 mcg/kg/min)-Rate/Dose Verify       1700 (5 mcg/kg/min)-Rate/Dose Verify       1730 (5 mcg/kg/min)-Rate/Dose Verify       1800 (5 mcg/kg/min)-Rate/Dose Verify       2034 (5 mcg/kg/min)-Rate/Dose Verify       2100 (5 mcg/kg/min)-Rate/Dose Verify       2200 (5 mcg/kg/min)-Rate/Dose Verify       2300 (5 mcg/kg/min)-Rate/Dose Verify        0000 (5 mcg/kg/min)-Rate/Dose Verify       0047 (6 mcg/kg/min)-Rate/Dose Change       0100 (6 mcg/kg/min)-Rate/Dose Verify       0200 (6 mcg/kg/min)-Rate/Dose Verify       0300 (6 mcg/kg/min)-Rate/Dose Verify       0400 (6 mcg/kg/min)-Rate/Dose Verify       0500 (6 mcg/kg/min)-Rate/Dose Verify       0600 (6 mcg/kg/min)-Rate/Dose Verify       0700 (6 mcg/kg/min)-Rate/Dose Verify       0800 (6 mcg/kg/min)-Rate/Dose Verify       0900 (6 mcg/kg/min)-Rate/Dose Verify       1000 (6 mcg/kg/min)-Rate/Dose Verify       1100 (6 mcg/kg/min)-Rate/Dose Verify       1200 (6 mcg/kg/min)-Rate/Dose Verify       1235 (6 mcg/kg/min)-New Bag       1300 (6 mcg/kg/min)-Rate/Dose Verify       1400 (6 mcg/kg/min)-Rate/Dose Verify       1500 (6 mcg/kg/min)-Rate/Dose Verify       1600 (6 mcg/kg/min)-Rate/Dose Verify       1700 (6 mcg/kg/min)-Rate/Dose Verify       1800 (6 mcg/kg/min)-Rate/Dose Verify       1900 (6  mcg/kg/min)-Rate/Dose Verify       2000 (6 mcg/kg/min)-Rate/Dose Verify       2100 (6 mcg/kg/min)-Rate/Dose Verify       2200 (6 mcg/kg/min)-Rate/Dose Verify       2300 (6 mcg/kg/min)-Rate/Dose Verify        0000 (6 mcg/kg/min)-Rate/Dose Verify       0100 (6 mcg/kg/min)-Rate/Dose Verify       0200 (6 mcg/kg/min)-Rate/Dose Verify       0300 (6 mcg/kg/min)-Rate/Dose Verify       0400 (6 mcg/kg/min)-Rate/Dose Verify       0500 (6 mcg/kg/min)-Rate/Dose Verify       0600 (6 mcg/kg/min)-Rate/Dose Verify       0700 (6 mcg/kg/min)-Rate/Dose Verify       0800 (6 mcg/kg/min)-Rate/Dose Verify       0900 (6 mcg/kg/min)-Rate/Dose Verify       0939 (7.5 mcg/kg/min)-Rate/Dose Change       1000 (7.5 mcg/kg/min)-Rate/Dose Verify       1100 (7.5 mcg/kg/min)-Rate/Dose Verify       1200 (7.5 mcg/kg/min)-Rate/Dose Verify       1300 (7.5 mcg/kg/min)-Rate/Dose Verify       1400 (7.5 mcg/kg/min)-Rate/Dose Verify       1510 (5 mcg/kg/min)-Rate/Dose Change [C]       1600 (5 mcg/kg/min)-Rate/Dose Verify       1700 (5 mcg/kg/min)-Rate/Dose Verify       1800 (5 mcg/kg/min)-Rate/Dose Verify       1900 (5 mcg/kg/min)-Rate/Dose Verify       1940 (5 mcg/kg/min)-New Bag       2000 (5 mcg/kg/min)-Rate/Dose Verify       2100 (5 mcg/kg/min)-Rate/Dose Verify       2200 (5 mcg/kg/min)-Rate/Dose Verify       2300 (5 mcg/kg/min)-Rate/Dose Verify        0000 (5 mcg/kg/min)-Rate/Dose Verify       0100 (5 mcg/kg/min)-Rate/Dose Verify       0200 (5 mcg/kg/min)-Rate/Dose Verify       0300 (5 mcg/kg/min)-Rate/Dose Verify       0400 (5 mcg/kg/min)-Rate/Dose Verify       0500 (5 mcg/kg/min)-Rate/Dose Verify       0600 (5 mcg/kg/min)-Rate/Dose Verify       0655 (7.5 mcg/kg/min)-Rate/Dose Change       0700 (7.5 mcg/kg/min)-Rate/Dose Verify       0800 (7.5 mcg/kg/min)-Rate/Dose Verify       0900 (7.5 mcg/kg/min)-Rate/Dose Verify       1000 (7.5 mcg/kg/min)-Rate/Dose Verify       1100 (7.5 mcg/kg/min)-Rate/Dose Verify       1200 (7.5 mcg/kg/min)-Rate/Dose Verify        1300 (7.5 mcg/kg/min)-Rate/Dose Verify       1400 (7.5 mcg/kg/min)-Rate/Dose Verify       1500 (7.5 mcg/kg/min)-Rate/Dose Verify       1600 (7.5 mcg/kg/min)-Rate/Dose Verify       1656 (7.5 mcg/kg/min)-New Bag       1700 (7.5 mcg/kg/min)-Rate/Dose Verify       1800 (7.5 mcg/kg/min)-Rate/Dose Verify       1900 (7.5 mcg/kg/min)-Rate/Dose Verify       2000 (7.5 mcg/kg/min)-Rate/Dose Verify       2100 (7.5 mcg/kg/min)-Rate/Dose Verify       2200 (7.5 mcg/kg/min)-Rate/Dose Verify       2300 (7.5 mcg/kg/min)-Rate/Dose Verify        0000 (7.5 mcg/kg/min)-Rate/Dose Verify       0100 (7.5 mcg/kg/min)-Rate/Dose Verify       0200 (7.5 mcg/kg/min)-Rate/Dose Verify       0300 (7.5 mcg/kg/min)-Rate/Dose Verify       0400 (7.5 mcg/kg/min)-Rate/Dose Verify       0500 (7.5 mcg/kg/min)-Rate/Dose Verify       0600 (7.5 mcg/kg/min)-Rate/Dose Verify       0700 (7.5 mcg/kg/min)-Rate/Dose Verify       0800 (7.5 mcg/kg/min)-Rate/Dose Verify       0900 (7.5 mcg/kg/min)-Rate/Dose Verify       1000 (7.5 mcg/kg/min)-Rate/Dose Verify       1007 (7.5 mcg/kg/min)-New Bag       1100 (7.5 mcg/kg/min)-Rate/Dose Verify           DULoxetine (CYMBALTA) EC capsule 120 mg  Dose: 120 mg Freq: DAILY Route: ORAL OR FEED  Start: 01/16/17 0800      0932 (120 mg)-Given [C]        0736 (120 mg)-Given        1007 (120 mg)-Given        0749 (120 mg)-Given        0837 (120 mg)-Given           ferrous sulfate (IRON) tablet 325 mg  Dose: 325 mg Freq: 2 TIMES DAILY Route: PO  Start: 01/06/17 2000   Admin Instructions: Absorbed best on an empty stomach. If stomach upset occurs, can take with meals.     0842 (325 mg)-Given       1930 (325 mg)-Given        0855 (325 mg)-Given       2044 (325 mg)-Given        0931 (325 mg)-Given [C]       2026 (325 mg)-Given        0736 (325 mg)-Given       1954 (325 mg)-Given        1006 (325 mg)-Given       1935 (325 mg)-Given        0748 (325 mg)-Given       1937 (325 mg)-Given        0839 (325 mg)-Given       [ ] 2000            "granisetron (KYTRIL) injection 1 mg  Dose: 1 mg Freq: EVERY 12 HOURS PRN Route: IV  PRN Reason: other  PRN Comment: headache  Start: 01/19/17 2103         2235 (1 mg)-Given            hydrALAZINE (APRESOLINE) tablet 100 mg  Dose: 100 mg Freq: 4 TIMES DAILY Route: PO  Start: 01/19/17 1630         (1639)-Not Given [C]       1938 (100 mg)-Given        0840 (100 mg)-Given       1224 (100 mg)-Given       [ ] 1600       [ ] 2000           hydrOXYzine (ATARAX) tablet 50 mg  Dose: 50 mg Freq: 3 TIMES DAILY Route: PO  Start: 01/06/17 1400    0842 (50 mg)-Given       1554 (50 mg)-Given [C]       1930 (50 mg)-Given        0855 (50 mg)-Given       1425 (50 mg)-Given       2211 (50 mg)-Given        0931 (50 mg)-Given [C]       1317 (50 mg)-Given       2026 (50 mg)-Given        0737 (50 mg)-Given       1332 (50 mg)-Given       1954 (50 mg)-Given        1005 (50 mg)-Given       1634 (50 mg)-Given       1935 (50 mg)-Given        0748 (50 mg)-Given       1443 (50 mg)-Given       1938 (50 mg)-Given        0839 (50 mg)-Given       [ ] 1400       [ ] 2000           isosorbide dinitrate (ISORDIL) tablet 30 mg  Dose: 30 mg Freq: 3 TIMES DAILY Route: PO  Start: 01/19/17 2000   Admin Instructions: Recommended to take on empty stomach.          1937 (30 mg)-Given        0838 (30 mg)-Given       [ ] 1400       [ ] 2000           lidocaine (LMX4) kit  Freq: EVERY 1 HOUR PRN Route: Top  PRN Reason: mild pain  PRN Comment: with VAD insertion or accessing implanted port,  Start: 01/06/17 1339   Admin Instructions: Do NOT give if patient has a history of allergy to any local anesthetic or any \"rama\" product.   Apply 30 min prior to VAD insertion or port access.  MAX Dose:  2.5 gm (  of 5 gm tube)               lidocaine (XYLOCAINE) 2 % 15 mL, alum & mag hydroxide-simethicone (MYLANTA ES/MAALOX  ES) 15 mL GI Cocktail  Dose: 30 mL Freq: 3 TIMES DAILY PRN Route: PO  PRN Reason: moderate pain  Start: 01/08/17 1249              magnesium sulfate 2 g " in NS intermittent infusion ([facility] or [facility] Cmpd)  Dose: 2 g Freq: DAILY PRN Route: IV  PRN Reason: magnesium supplementation  Start: 01/06/17 1454   Admin Instructions: For Serum Mg++ 1.6 - 2 mg/dL  Give 2 g and recheck magnesium level next AM.     0520 (2 g)-New Bag        1813 (2 g)-New Bag        0608 (2 g)-New Bag         1518 (2 g)-New Bag             magnesium sulfate 4 g in 100 mL sterile water (premade)  Dose: 4 g Freq: EVERY 4 HOURS PRN Route: IV  PRN Reason: magnesium supplementation  Start: 01/06/17 1454   Admin Instructions: For serum Mg++ less than 1.6 mg/dL  Give 4 g and recheck magnesium level 2 hours after dose, and next AM.               medication instruction  Freq: CONTINUOUS PRN Route: XX  Start: 01/06/17 1339   Admin Instructions: No IV fluids               melatonin tablet 3 mg  Dose: 3 mg Freq: AT BEDTIME PRN Route: PO  PRN Reason: sleep  Start: 01/06/17 1337              multivitamin, therapeutic with minerals (THERA-VIT-M) tablet 1 tablet  Dose: 1 tablet Freq: DAILY Route: PO  Start: 01/20/17 1030          1224 (1 tablet)-Given           naloxone (NARCAN) injection 0.1-0.4 mg  Dose: 0.1-0.4 mg Freq: EVERY 2 MIN PRN Route: IV  PRN Reason: opioid reversal  Start: 01/09/17 8349   Admin Instructions: For respiratory rate LESS than or EQUAL to 8.  Partial reversal dose:  0.1 mg titrated q 2 minutes for Analgesia Side Effects Monitoring Sedation Level of 3 (frequently drowsy, arousable, drifts to sleep during conversation).Full reversal dose:  0.4 mg bolus for Analgesia Side Effects Monitoring Sedation Level of 4 (somnolent, minimal or no response to stimulation).               nitroprusside (NIPRIDE) 50 mg, sodium thiosulfate 500 mg in D5W 125 mL infusion (conc: 0.4mg/mL)  Rate: 5.5 mL/hr Freq: CONTINUOUS Route: IV  Last Dose: 0.5 mcg/kg/min (01/20/17 1100)  Start: 01/13/17 1830   Admin Instructions: For range orders: start at lowest dose ordered. Titrate by 0.25 to 0.5 mcg/kg/min every 5  minutes to keep  Cardiac index greater than 2.  Conc: 0.4 mg/mL. Protect from light.     0000 (0.995 mcg/kg/min)-Rate/Dose Verify       0100 (0.995 mcg/kg/min)-Rate/Dose Verify       0200 (0.995 mcg/kg/min)-Rate/Dose Verify       0300 (0.995 mcg/kg/min)-Rate/Dose Verify       0400 (0.995 mcg/kg/min)-Rate/Dose Verify       0500 (0.995 mcg/kg/min)-Rate/Dose Verify       0600 (0.995 mcg/kg/min)-Rate/Dose Verify       0700 (0.995 mcg/kg/min)-Rate/Dose Verify       0800 (0.995 mcg/kg/min)-Rate/Dose Verify       0900 (0.995 mcg/kg/min)-Rate/Dose Verify       0924 (0.995 mcg/kg/min)-New Bag       1000 (0.995 mcg/kg/min)-Rate/Dose Verify       1100 (0.995 mcg/kg/min)-Rate/Dose Verify       1200 (0.995 mcg/kg/min)-Rate/Dose Verify       1300 (0.995 mcg/kg/min)-Rate/Dose Verify       1400 (0.995 mcg/kg/min)-Rate/Dose Verify       1500 (0.995 mcg/kg/min)-Rate/Dose Verify       1600 (0.995 mcg/kg/min)-Rate/Dose Verify       1631 (0.995 mcg/kg/min)-New Bag       1700 (0.995 mcg/kg/min)-Rate/Dose Verify       1800 (0.995 mcg/kg/min)-Rate/Dose Verify       1900 (1 mcg/kg/min)-Rate/Dose Change       1905 (1.25 mcg/kg/min)-Rate/Dose Change       2100 (1.5 mcg/kg/min)-Rate/Dose Change       2200 (1.5 mcg/kg/min)-Rate/Dose Verify       2300 (1.5 mcg/kg/min)-Rate/Dose Verify        0000 (1.5 mcg/kg/min)-Rate/Dose Verify       0029 (1.5 mcg/kg/min)-New Bag       0100 (1.5 mcg/kg/min)-Rate/Dose Verify       0200 (1.5 mcg/kg/min)-Rate/Dose Verify       0300 (1.5 mcg/kg/min)-Rate/Dose Verify       0400 (1.5 mcg/kg/min)-Rate/Dose Verify       0500 (1.5 mcg/kg/min)-Rate/Dose Verify       0600 (1.5 mcg/kg/min)-Rate/Dose Verify       0700 (1.5 mcg/kg/min)-Rate/Dose Verify       0800 (1.5 mcg/kg/min)-Rate/Dose Verify       0803 (1.5 mcg/kg/min)-New Bag       0900 (1.5 mcg/kg/min)-Rate/Dose Verify       1000 (1.5 mcg/kg/min)-Rate/Dose Verify       1100 (1.5 mcg/kg/min)-Rate/Dose Verify       1200 (1.5 mcg/kg/min)-Rate/Dose Verify       1300 (1.5  mcg/kg/min)-Rate/Dose Verify       1400 (1.5 mcg/kg/min)-Rate/Dose Verify       1500 (1.5 mcg/kg/min)-Rate/Dose Verify       1524 (1.5 mcg/kg/min)-New Bag       1600 (1.5 mcg/kg/min)-Rate/Dose Verify       1700 (1.5 mcg/kg/min)-Rate/Dose Verify       1720 (2 mcg/kg/min)-Rate/Dose Change       1800 (2 mcg/kg/min)-Rate/Dose Verify       1830 (2.25 mcg/kg/min)-Rate/Dose Change       1900 (2.25 mcg/kg/min)-Rate/Dose Verify       2000 (2.25 mcg/kg/min)-Rate/Dose Verify       2042 (2.25 mcg/kg/min)-New Bag       2104 (2 mcg/kg/min)-Rate/Dose Change       2200 (2 mcg/kg/min)-Rate/Dose Verify       2234 (1.75 mcg/kg/min)-Rate/Dose Change       2300 (1.75 mcg/kg/min)-Rate/Dose Verify        0000 (1.75 mcg/kg/min)-Rate/Dose Verify       0100 (1.75 mcg/kg/min)-Rate/Dose Verify       0155 (1.75 mcg/kg/min)-New Bag       0200 (1.75 mcg/kg/min)-Rate/Dose Verify       0300 (1.75 mcg/kg/min)-Rate/Dose Verify       0400 (1.75 mcg/kg/min)-Rate/Dose Verify       0439 (1.5 mcg/kg/min)-Rate/Dose Change       0500 (1.5 mcg/kg/min)-Rate/Dose Verify       0523 (1.25 mcg/kg/min)-Rate/Dose Change       0600 (1.25 mcg/kg/min)-Rate/Dose Verify       0700 (1.25 mcg/kg/min)-Rate/Dose Verify       0800 (1.25 mcg/kg/min)-Rate/Dose Verify       0844 (1.25 mcg/kg/min)-New Bag       0845 (1.5 mcg/kg/min)-Rate/Dose Change       0900 (1.5 mcg/kg/min)-Rate/Dose Verify       0915 (1.75 mcg/kg/min)-Rate/Dose Change       0930 (2 mcg/kg/min)-Rate/Dose Change       0945 (2.25 mcg/kg/min)-Rate/Dose Change       1000 (2.5 mcg/kg/min)-Rate/Dose Change       1015 (2.75 mcg/kg/min)-Rate/Dose Change       1030 (3 mcg/kg/min)-Rate/Dose Change       1100 (3 mcg/kg/min)-Rate/Dose Verify       1115 (2.75 mcg/kg/min)-Rate/Dose Change       1130 (2.5 mcg/kg/min)-Rate/Dose Change       1145 (2.5 mcg/kg/min)-Rate/Dose Verify       1215 (2.75 mcg/kg/min)-Rate/Dose Change       1245 (3 mcg/kg/min)-Rate/Dose Change       1314 (3 mcg/kg/min)-New Bag       1315 (2.75  mcg/kg/min)-Rate/Dose Change       1330 (2.5 mcg/kg/min)-Rate/Dose Change       1345 (2.25 mcg/kg/min)-Rate/Dose Change       1400 (1.75 mcg/kg/min)-Rate/Dose Change       1415 (1.5 mcg/kg/min)-Rate/Dose Change       1430 (1 mcg/kg/min)-Rate/Dose Change       1500 (1 mcg/kg/min)-Rate/Dose Verify       1540 (0.75 mcg/kg/min)-Rate/Dose Change       1600 (0.75 mcg/kg/min)-Rate/Dose Verify       1630 (1 mcg/kg/min)-Rate/Dose Change       1700 (1 mcg/kg/min)-Rate/Dose Verify       1732 (2 mcg/kg/min)-Rate/Dose Change       1800 (2 mcg/kg/min)-Rate/Dose Verify       2034 (2 mcg/kg/min)-New Bag       2100 (2 mcg/kg/min)-Rate/Dose Verify       2118 (2.25 mcg/kg/min)-Rate/Dose Change       2200 (2.25 mcg/kg/min)-Rate/Dose Verify       2300 (2.25 mcg/kg/min)-Rate/Dose Verify        0000 (2.25 mcg/kg/min)-Rate/Dose Verify       0048 (2.5 mcg/kg/min)-Rate/Dose Change       0100 (2.5 mcg/kg/min)-Rate/Dose Verify       0134 (2.5 mcg/kg/min)-New Bag       0200 (2.5 mcg/kg/min)-Rate/Dose Verify       0300 (2.5 mcg/kg/min)-Rate/Dose Verify       0400 (2.502 mcg/kg/min)-Rate/Dose Verify       0411 (2 mcg/kg/min)-Rate/Dose Change       0500 (2 mcg/kg/min)-Rate/Dose Verify       0600 (2 mcg/kg/min)-Rate/Dose Verify       0714 (2 mcg/kg/min)-New Bag       0800 (2 mcg/kg/min)-Rate/Dose Verify       0900 (2 mcg/kg/min)-Rate/Dose Verify       1000 (2 mcg/kg/min)-Rate/Dose Verify       1100 (2 mcg/kg/min)-Rate/Dose Verify       1200 (2 mcg/kg/min)-Rate/Dose Verify       1235 (2 mcg/kg/min)-New Bag       1300 (2 mcg/kg/min)-Rate/Dose Verify       1400 (2 mcg/kg/min)-Rate/Dose Verify       1500 (2 mcg/kg/min)-Rate/Dose Verify       1600 (2 mcg/kg/min)-Rate/Dose Verify       1636 (1.75 mcg/kg/min)-Rate/Dose Change       1700 (1.75 mcg/kg/min)-Rate/Dose Verify       1800 (1.75 mcg/kg/min)-Rate/Dose Verify       1900 (1.75 mcg/kg/min)-New Bag       2000 (1.75 mcg/kg/min)-Rate/Dose Verify       2100 (1.75 mcg/kg/min)-Rate/Dose Verify       2200  (1.75 mcg/kg/min)-Rate/Dose Verify       2300 (1.75 mcg/kg/min)-Rate/Dose Verify        0000 (1.75 mcg/kg/min)-Rate/Dose Verify       0019 (1.5 mcg/kg/min)-Rate/Dose Change [C]       0033 (1.5 mcg/kg/min)-Rate/Dose Change       0100 (1.5 mcg/kg/min)-Rate/Dose Verify       0106 (1.5 mcg/kg/min)-New Bag       0200 (1.5 mcg/kg/min)-Rate/Dose Verify       0300 (1.5 mcg/kg/min)-Rate/Dose Verify       0350 (1 mcg/kg/min)-Rate/Dose Change       0400 (1 mcg/kg/min)-Rate/Dose Verify       0500 (1 mcg/kg/min)-Rate/Dose Verify       0600 (1 mcg/kg/min)-Rate/Dose Verify       0655 (0.5 mcg/kg/min)-Rate/Dose Change       0700 (1 mcg/kg/min)-Rate/Dose Verify       0800 (0.5 mcg/kg/min)-Rate/Dose Change       0900 (0.5 mcg/kg/min)-Rate/Dose Verify       1000 (0.5 mcg/kg/min)-Rate/Dose Verify       1100 (0.5 mcg/kg/min)-Rate/Dose Verify       1141 (0.75 mcg/kg/min)-Rate/Dose Change       1200 (0.75 mcg/kg/min)-Rate/Dose Verify       1300 (0.75 mcg/kg/min)-Rate/Dose Verify       1345 (0.75 mcg/kg/min)-New Bag       1400 (0.75 mcg/kg/min)-Rate/Dose Verify       1500 (0.75 mcg/kg/min)-Rate/Dose Verify       1600 (0.75 mcg/kg/min)-Rate/Dose Verify       1700 (0.75 mcg/kg/min)-Rate/Dose Verify       1800 (0.75 mcg/kg/min)-Rate/Dose Verify       1900 (0.75 mcg/kg/min)-Rate/Dose Verify       2000 (0.75 mcg/kg/min)-Rate/Dose Verify       2100 (0.75 mcg/kg/min)-Rate/Dose Verify       2200 (0.75 mcg/kg/min)-Rate/Dose Verify       2300 (0.75 mcg/kg/min)-Rate/Dose Verify        0000 (0.75 mcg/kg/min)-Rate/Dose Verify       0100 (0.75 mcg/kg/min)-Rate/Dose Verify       0200 (0.75 mcg/kg/min)-Rate/Dose Verify       0300 (0.75 mcg/kg/min)-Rate/Dose Verify       0400 (0.75 mcg/kg/min)-Rate/Dose Verify       0500 (0.75 mcg/kg/min)-Rate/Dose Verify       0535 (0.75 mcg/kg/min)-New Bag       0547 (1 mcg/kg/min)-Rate/Dose Change       0600 (1 mcg/kg/min)-Rate/Dose Verify       0658 (1.25 mcg/kg/min)-Rate/Dose Change       0700 (1.25  mcg/kg/min)-Rate/Dose Verify       0800 (1.25 mcg/kg/min)-Rate/Dose Verify       0900 (1.25 mcg/kg/min)-Rate/Dose Verify       1000 (1.25 mcg/kg/min)-Rate/Dose Verify       1100 (1.25 mcg/kg/min)-Rate/Dose Verify       1115 (1 mcg/kg/min)-Rate/Dose Change       1200 (1 mcg/kg/min)-Rate/Dose Verify       1300 (1 mcg/kg/min)-Rate/Dose Verify       1330 (0.5 mcg/kg/min)-Rate/Dose Change       1400 (0.5 mcg/kg/min)-Rate/Dose Verify       1500 (0.5 mcg/kg/min)-Rate/Dose Verify       1600 (0.5 mcg/kg/min)-Rate/Dose Verify       1659 (0.5 mcg/kg/min)-New Bag       1700 (0.5 mcg/kg/min)-Rate/Dose Verify       1730 (0.25 mcg/kg/min)-Rate/Dose Change       1800 (0.25 mcg/kg/min)-Rate/Dose Verify       1900 (0.25 mcg/kg/min)-Rate/Dose Verify       1909 (0.25 mcg/kg/min)-Rate/Dose Change       2000 (0.25 mcg/kg/min)-Rate/Dose Verify       2048-Stopped [C]        0051 (0.25 mcg/kg/min)-Restarted       0100 (0.25 mcg/kg/min)-Rate/Dose Verify       0200 (0.25 mcg/kg/min)-Rate/Dose Verify       0300 (0.25 mcg/kg/min)-Rate/Dose Verify       0400 (0.25 mcg/kg/min)-Rate/Dose Verify       0500 (0.25 mcg/kg/min)-Rate/Dose Verify       0600 (0.25 mcg/kg/min)-Rate/Dose Verify       0700 (0.25 mcg/kg/min)-Rate/Dose Verify       0800 (0.25 mcg/kg/min)-Rate/Dose Verify       0900 (0.25 mcg/kg/min)-Rate/Dose Verify       1000 (0.25 mcg/kg/min)-Rate/Dose Verify       1005 (0.25 mcg/kg/min)-New Bag       1021 (0.5 mcg/kg/min)-Rate/Dose Change       1100 (0.5 mcg/kg/min)-Rate/Dose Verify           ondansetron (ZOFRAN-ODT) ODT tab 4 mg  Dose: 4 mg Freq: EVERY 6 HOURS PRN Route: PO  PRN Reason: nausea  Start: 01/06/17 1342   Admin Instructions: This is Step 1 of nausea and vomiting management.  If nausea not resolved in 15 minutes, go to Step 2 prochlorperazine (COMPAZINE). Do not push through foil backing. Peel back foil and gently remove. Place on tongue immediately. Administration with liquid unnecessary      2214 (4 mg)-Given          1901 (4  mg)-Given            Or  ondansetron (ZOFRAN) injection 4 mg  Dose: 4 mg Freq: EVERY 6 HOURS PRN Route: IV  PRN Reasons: nausea,vomiting  Start: 01/06/17 1342   Admin Instructions: This is Step 1 of nausea and vomiting management.  If nausea not resolved in 15 minutes, go to Step 2 prochlorperazine (COMPAZINE).                             pantoprazole (PROTONIX) EC tablet 40 mg  Dose: 40 mg Freq: EVERY MORNING Route: PO  Start: 01/13/17 0800   Admin Instructions: DO NOT CRUSH.     0842 (40 mg)-Given        0855 (40 mg)-Given        0932 (40 mg)-Given [C]        0736 (40 mg)-Given        1005 (40 mg)-Given        0748 (40 mg)-Given        0839 (40 mg)-Given           Patient is already receiving anticoagulation with heparin, enoxaparin (LOVENOX), warfarin (COUMADIN)  or other anticoagulant medication  Freq: CONTINUOUS PRN Route: XX  Start: 01/06/17 1341              polyethylene glycol (MIRALAX/GLYCOLAX) Packet 17 g  Dose: 17 g Freq: DAILY Route: PO  Start: 01/06/17 1345   Admin Instructions: 1 Packet = 17 grams. Mixed prescribed dose in 8 ounces of water. Follow with 8 oz. of water.     (0843)-Not Given        (0855)-Not Given        (0933)-Not Given        (0736)-Not Given        (1008)-Not Given        (0750)-Not Given        (0840)-Not Given           potassium chloride (KLOR-CON) Packet 20-40 mEq  Dose: 20-40 mEq Freq: EVERY 2 HOURS PRN Route: ORAL OR FEED  PRN Reason: potassium supplementation  Start: 01/16/17 1742   Admin Instructions: Use if unable to tolerate tablets.    If Serum K+ 3.4-4.0, dose = 20 mEq x1. Recheck K+ level the next AM.  If Serum K+ 3.0-3.3, dose = 60 mEq po total dose (40 mEq x 1 followed in 2 hours by 20 mEq X1). Recheck K+ level 4 hours after dose and the next AM.  If Serum K+ 2.5-2.9, dose = 80 mEq po total dose (40 mEq Q2H x2). Recheck K+ level 4 hours after dose and the next AM.  If Serum K+ less than 2.5, See IV order.  Dissolve packet contents in 4-8 ounces of cold water or juice.                potassium chloride 10 mEq in 100 mL intermittent infusion  Dose: 10 mEq Freq: EVERY 1 HOUR PRN Route: IV  PRN Reason: potassium supplementation  Start: 01/16/17 1742   Admin Instructions: Infuse via PERIPHERAL LINE or CENTRAL LINE. Use for central line replacement if patient weight less than 65 kg, if patient is on TPN with high potassium content or if unit does not stock 20 mEq bags.  If Serum K+ 3.4-4.0, dose = 10 mEq/hr x2 doses. Recheck K+ level the next AM.  If Serum K+ 3.0-3.3, dose = 10 mEq/hr x4 doses (40 mEq IV total dose). Recheck K+ level 2 hours after dose and the next AM.  If Serum K+ less than 3.0, dose = 10 mEq/hr x6 doses (60 mEq IV total dose). Recheck K+ level 2 hours after dose and the next AM.               potassium chloride 10 mEq in 100 mL intermittent infusion with 10 mg lidocaine  Dose: 10 mEq Freq: EVERY 1 HOUR PRN Route: IV  PRN Reason: potassium supplementation  Start: 01/16/17 1742   Admin Instructions: Infuse via PERIPHERAL LINE. Use potassium with lidocaine for pain with peripheral administration.  If Serum K+ 3.4-4.0, dose = 10 mEq/hr x2 doses. Recheck K+ level the next AM.  If Serum K+ 3.0-3.3, dose = 10 mEq/hr x4 doses (40 mEq IV total dose). Recheck K+ level 2 hours after dose and the next AM.  If Serum K+ less than 3.0, dose = 10 mEq/hr x6 doses (60 mEq IV total dose). Recheck K+ level 2 hours after dose and the next AM.               potassium chloride 20 mEq in 50 mL intermittent infusion  Dose: 20 mEq Freq: EVERY 1 HOUR PRN Route: IV  PRN Reason: potassium supplementation  Last Dose: 20 mEq (01/20/17 0519)  Start: 01/16/17 1742   Admin Instructions: Infuse via CENTRAL LINE Only.  May need EKG if less than 65 kg or on TPN - Max rate is 0.3 mEq/kg/hr for patients not on EKG monitoring.    If Serum K+ 3.4-4.0, dose = 20 mEq/hr x1 doses. Recheck K+ level the next AM.  If Serum K+ 3.0-3.3, dose = 20 mEq/hr x2 doses (40 mEq IV total dose).  Recheck K+ level 2 hours after  dose and the next AM.  If Serum K+ less than 3.0, dose = 20 mEq/hr x3 doses (60 mEq IV total dose). Recheck K+ level 2 hours after dose and the next AM.       1840 (20 mEq)-New Bag        0531 (20 mEq)-New Bag       0638 (20 mEq)-New Bag       0738 (20 mEq)-New Bag       1235 (20 mEq)-New Bag       1337 (20 mEq)-New Bag       1453 (20 mEq)-New Bag       1900 (20 mEq)-New Bag        0533 (20 mEq)-New Bag       0632 (20 mEq)-New Bag       1523 (20 mEq)-New Bag        0100 (20 mEq)-New Bag       0225 (20 mEq)-New Bag       0424 (20 mEq)-New Bag       0625 (20 mEq)-New Bag       1326 (20 mEq)-New Bag [C]       1442 (20 mEq)-New Bag [C]       2052 (20 mEq)-New Bag        0519 (20 mEq)-New Bag           potassium chloride SA (K-DUR/KLOR-CON M) CR tablet 20-40 mEq  Dose: 20-40 mEq Freq: EVERY 2 HOURS PRN Route: PO  PRN Reason: potassium supplementation  Start: 01/16/17 1742   Admin Instructions: Use if able to take PO.   If Serum K+ 3.4-4.0, dose = 20 mEq x1. Recheck K+ level the next AM.  If Serum K+ 3.0-3.3, dose = 60 mEq po total dose (40 mEq x1 followed in 2 hours by 20 mEq x1). Recheck K+ level 4 hours after dose and the next AM.  If Serum K+ 2.5-2.9, dose = 80 mEq po total dose (40 mEq Q2H x2). Recheck K+ level 4 hours after dose and the next AM.  If Serum K+ less than 2.5, See IV order.  DO NOT CRUSH.         1634 (40 mEq)-Given         1000 (40 mEq)-Given           potassium chloride SA (K-DUR/KLOR-CON M) CR tablet 40 mEq  Dose: 40 mEq Freq: 2 TIMES DAILY Route: PO  Start: 01/19/17 0715   Admin Instructions: DO NOT CRUSH.          0750 (40 mEq)-Given [C]       1937 (40 mEq)-Given        0838 (40 mEq)-Given       [ ] 2000           potassium phosphate 10 mmol in D5W 250 mL intermittent infusion  Dose: 10 mmol Freq: DAILY PRN Route: IV  PRN Reason: phosphorous supplementation  Start: 01/14/17 0540   Admin Instructions: For serum phosphorus level 2.5-2.7  Do not infuse Phosphorus in the same line as TPN.   Give 10  mmol and recheck phosphorus level the next AM.               potassium phosphate 15 mmol in D5W 250 mL intermittent infusion  Dose: 15 mmol Freq: DAILY PRN Route: IV  PRN Reason: phosphorous supplementation  Start: 01/14/17 0540   Admin Instructions: For serum phosphorus level 2.0-2.4  Do not infuse Phosphorus in the same line as TPN.   Give 15 mmol and recheck phosphorus level next AM.     1338 (15 mmol)-New Bag                 potassium phosphate 20 mmol in D5W 250 mL intermittent infusion  Dose: 20 mmol Freq: EVERY 6 HOURS PRN Route: IV  PRN Reason: phosphorous supplementation  Start: 01/14/17 0540   Admin Instructions: For serum phosphorus level 1.1-1.9  For CENTRAL Line ONLY  Do not infuse Phosphorus in the same line as TPN.   Give 20 mmol and recheck phosphorus level 2 hours after dose and next AM.               potassium phosphate 20 mmol in D5W 500 mL intermittent infusion  Dose: 20 mmol Freq: EVERY 6 HOURS PRN Route: IV  PRN Reason: phosphorous supplementation  Start: 01/14/17 0540   Admin Instructions: For serum phosphorus level 1.1-1.9  For peripheral line  Do not infuse Phosphorus in the same line as TPN.   Give 20 mmol and recheck phosphorus level 2 hours after dose and next AM. Repeat if necessary.               potassium phosphate 25 mmol in D5W 500 mL intermittent infusion  Dose: 25 mmol Freq: EVERY 8 HOURS PRN Route: IV  PRN Reason: phosphorous supplementation  Start: 01/14/17 0540   Admin Instructions: For serum phosphorus level less than 1.1  Do not infuse Phosphorus in the same line as TPN.   Give 25 mmol and recheck phosphorus level 2 hours after dose and next AM.               senna-docusate (SENOKOT-S;PERICOLACE) 8.6-50 MG per tablet 2 tablet  Dose: 2 tablet Freq: 2 TIMES DAILY PRN Route: PO  PRN Reason: constipation  Start: 01/08/17 1550              sodium chloride (PF) 0.9% PF flush 3 mL  Dose: 3 mL Freq: EVERY 8 HOURS Route: IK  Start: 01/06/17 1345   Admin Instructions: And Q1H PRN, to  lock peripheral IV dormant line.     (0843)-Not Given       (1600)-Not Given        0000 (3 mL)-Given       (0856)-Not Given       1531 (3 mL)-Given        (0002)-Not Given       (0910)-Not Given       (1557)-Not Given        (0015)-Not Given       (0907)-Not Given       (1515)-Not Given        (0017)-Not Given       (0940)-Not Given       (1615)-Not Given        (0036)-Not Given       (0750)-Not Given       (1615)-Not Given        (0055)-Not Given       (0840)-Not Given       [ ] 1600           sodium chloride (PF) 0.9% PF flush 3 mL  Dose: 3 mL Freq: EVERY 1 HOUR PRN Route: IK  PRN Reasons: line flush,post meds or blood draw  Start: 01/06/17 1339   Admin Instructions: for peripheral IV flush post IV meds               thiamine tablet 100 mg  Dose: 100 mg Freq: DAILY Route: PO  Start: 01/20/17 1030          1224 (100 mg)-Given           traMADol (ULTRAM) half-tab 25 mg  Dose: 25 mg Freq: ONCE Route: PO  Start: 01/19/17 1830         (2004)-Not Given            Warfarin Therapy Reminder (Check START DATE - warfarin may be starting in the FUTURE)  Freq: CONTINUOUS PRN Route: XX  Start: 01/06/17 1338   Admin Instructions: *Note to reorder warfarin daily*  Pharmacy Warfarin Dosing Service  Patient is on Warfarin Therapy - check for daily order              Future Medications  Medications 01/14/17 01/15/17 01/16/17 01/17/17 01/18/17 01/19/17 01/20/17       warfarin (COUMADIN) tablet 4 mg  Dose: 4 mg Freq: ONCE AT 6PM Route: PO  Start: 01/20/17 1800          [ ] 1800          Completed Medications  Medications 01/14/17 01/15/17 01/16/17 01/17/17 01/18/17 01/19/17 01/20/17         Dose: 650 mg Freq: ONCE Route: PO  Start: 01/20/17 0845   End: 01/20/17 0959   Admin Instructions: Maximum acetaminophen dose from all sources = 75 mg/kg/day not to exceed 4 grams/day.           0959 (650 mg)-Given             Dose: 2 mg Freq: ONCE Route: IV  Start: 01/18/17 1145   End: 01/18/17 1151        1151 (2 mg)-Given               Dose: 3  mg Freq: ONCE Route: IV  Start: 01/20/17 0100   End: 01/20/17 0104          0104 (3 mg)-Given             Dose: 3 mg Freq: ONCE Route: IV  Start: 01/19/17 1545   End: 01/19/17 1626         1626 (3 mg)-Given              Dose: 3 mg Freq: ONCE Route: IV  Start: 01/19/17 0100   End: 01/19/17 0100         0100 (3 mg)-Given              Dose: 100 mg Freq: ONCE Route: PO  Start: 01/20/17 1030   End: 01/20/17 1234          1234 (100 mg)-Given             Dose: 500 mg Freq: ONCE Route: IV  Start: 01/19/17 0715   End: 01/19/17 0934   Admin Instructions: Administer via slow IV push over 5 minutes          0904 (500 mg)-Given [C]              Dose: 10 mg Freq: ONCE Route: PO  Start: 01/19/17 0545   End: 01/19/17 0749         0749 (10 mg)-Given              Dose: 50 mg Freq: ONCE Route: IV  Start: 01/19/17 2015   End: 01/19/17 2011 2011 (50 mg)-Given              Dose: 25 mg Freq: ONCE Route: PO  Start: 01/19/17 1645   End: 01/19/17 1656         1656 (25 mg)-Given              Dose: 25 mg Freq: ONCE Route: PO  Start: 01/17/17 2245   End: 01/18/17 0026        0026 (25 mg)-Given               Dose: 200 mg Freq: ONCE Route: PO  Start: 01/20/17 0845   End: 01/20/17 1000          1000 (200 mg)-Given             Dose: 20 mg Freq: ONCE Route: PO  Start: 01/17/17 2245   End: 01/18/17 0026   Admin Instructions: Recommended to take on empty stomach.         0026 (20 mg)-Given               Dose: 2 g Freq: ONCE Route: IV  Start: 01/19/17 2115   End: 01/19/17 2152         2152 (2 g)-New Bag              Dose: 5 mg Freq: ONCE Route: PO  Start: 01/18/17 0915   End: 01/18/17 1005        1005 (5 mg)-Given               Last Dose: 500 mL (01/18/17 1941)  Start: 01/18/17 1930   End: 01/18/17 1941   Admin Instructions: DANYA SIM: cabinet override         1941 (500 mL)-New Bag [C]               Dose: 20 mEq Freq: ONCE Route: IV  Start: 01/20/17 1030   End: 01/20/17 1027   Admin Instructions: Infuse through Central Line over 1 hour.            1027 (20 mEq)-New Bag             Dose: 20 mEq Freq: EVERY HOUR Route: IV  Last Dose: 20 mEq (01/18/17 1612)  Start: 01/18/17 1615   End: 01/18/17 1935   Admin Instructions: Infuse through Central Line over 1 hour.         1612 (20 mEq)-New Bag [C]       1714 (20 mEq)-New Bag [C]       1809 (20 mEq)-New Bag [C]       1845 (20 mEq)-New Bag [C]       1935 (20 mEq)-New Bag               Dose: 20 mEq Freq: ONCE Route: PO  Start: 01/17/17 2230   End: 01/17/17 2226   Admin Instructions: DO NOT CRUSH.        2226 (20 mEq)-Given                Dose: 10 mg Freq: ONCE Route: IV  Start: 01/19/17 2015   End: 01/19/17 2011 2011 (10 mg)-Given              Dose: 25 mg Freq: ONCE Route: PO  Start: 01/19/17 0045   End: 01/19/17 0100         0100 (25 mg)-Given              Dose: 3 mg Freq: ONCE AT 6PM Route: PO  Start: 01/17/17 1800   End: 01/17/17 1758       1758 (3 mg)-Given                Dose: 5 mg Freq: ONCE AT 6PM Route: PO  Start: 01/19/17 1800   End: 01/19/17 1704         1704 (5 mg)-Given              Dose: 5 mg Freq: ONCE AT 6PM Route: PO  Start: 01/18/17 1800   End: 01/18/17 2149        2149 (5 mg)-Given            Discontinued Medications  Medications 01/14/17 01/15/17 01/16/17 01/17/17 01/18/17 01/19/17 01/20/17         Rate: 10 mL/hr Freq: CONTINUOUS Route: IV  Last Dose: Stopped (01/18/17 1614)  Start: 01/18/17 1600   End: 01/19/17 0653   Admin Instructions: Administer through 0.22 micron filter         1614-Hold [C]        0653-Med Discontinued          Rate: 60 mL/hr Freq: CONTINUOUS Route: IV  Start: 01/18/17 1600   End: 01/18/17 1554   Admin Instructions: Administer through 0.22 micron filter         1554-Med Discontinued           Dose: 75 mg Freq: ONCE Route: IV  Start: 01/18/17 1600   End: 01/19/17 1004        1615-Hold [C]        1004-Med Discontinued          Dose: 75 mg Freq: ONCE Route: IV  Start: 01/18/17 1515   End: 01/19/17 1004        1613-Hold [C]        1004-Med Discontinued           Dose: 3 mg Freq: 3 TIMES DAILY Route: IV  Start: 01/16/17 0600   End: 01/18/17 1137      0554 (3 mg)-Given       1228 (3 mg)-Given       1732 (3 mg)-Given        0636 (3 mg)-Given       1112 (3 mg)-Given       1751 (3 mg)-Given        0533 (3 mg)-Given       1137-Med Discontinued           Dose: 500 mg Freq: ONCE Route: PO  Start: 01/19/17 0700   End: 01/19/17 0707   Admin Instructions: Shake well.          0707-Med Discontinued  (0749)-Not Given              Dose: 25 mg Freq: 4 TIMES DAILY Route: PO  Start: 01/17/17 1615   End: 01/17/17 2243       1639 (25 mg)-Given       1954 (25 mg)-Given       2243-Med Discontinued            Dose: 50 mg Freq: 4 TIMES DAILY Route: PO  Start: 01/18/17 0800   End: 01/18/17 1138        1006 (50 mg)-Given       1138-Med Discontinued           Dose: 75 mg Freq: 4 TIMES DAILY Route: PO  Start: 01/19/17 1015   End: 01/19/17 1629         1024 (75 mg)-Given       1626 (75 mg)-Given       1629-Med Discontinued          Dose: 20 mg Freq: 3 TIMES DAILY Route: PO  Start: 01/17/17 1615   End: 01/17/17 2243   Admin Instructions: Recommended to take on empty stomach.        1639 (20 mg)-Given       1954 (20 mg)-Given       2243-Med Discontinued            Dose: 30 mg Freq: 3 TIMES DAILY Route: PO  Start: 01/19/17 1015   End: 01/19/17 1629   Admin Instructions: Recommended to take on empty stomach.          1027 (30 mg)-Given       1443 (30 mg)-Given       1629-Med Discontinued          Dose: 40 mg Freq: 3 TIMES DAILY Route: PO  Start: 01/19/17 1630   End: 01/19/17 1834   Admin Instructions: Recommended to take on empty stomach.          (1638)-Not Given [C]       1834-Med Discontinued          Dose: 40 mg Freq: 3 TIMES DAILY Route: PO  Start: 01/18/17 0800   End: 01/18/17 1138   Admin Instructions: Recommended to take on empty stomach.         1006 (40 mg)-Given       1138-Med Discontinued           Dose: 2 g Freq: EVERY HOUR Route: IV  Start: 01/18/17 1615   End: 01/18/17 9805        1638 (2  g)-New Bag       1759-Med Discontinued  (1842)-Not Given [C]               Dose: 40 mEq Freq: 2 TIMES DAILY Route: PO  Start: 01/19/17 0715   End: 01/19/17 0702         0702-Med Discontinued          Dose: 40 mEq Freq: 2 TIMES DAILY Route: PO  Start: 01/19/17 0800   End: 01/19/17 0701         0701-Med Discontinued          Dose: 40 mEq Freq: DAILY Route: PO  Start: 01/18/17 1630   End: 01/19/17 0700                0700-Med Discontinued     Medications 01/14/17 01/15/17 01/16/17 01/17/17 01/18/17 01/19/17 01/20/17

## 2017-01-06 NOTE — PROGRESS NOTES
ELZA Rodríguez is a 26 year old woman with advanced heart failure, dobutamine dependent who presents to clinic with weight gain, chest pressure, and increasing shortness of breath. She's also had increasing episodes of lightheadedness and vomiting with standing or any exertion. She has not fallen or lost consciousness but feels miserable.    Current Outpatient Prescriptions on File Prior to Visit:  D5W 5 % SOLN 170 mL with DOBUTamine 250 MG/20ML SOLN 1,000 mg Inject 0.36 mg/min into the vein continuous   Potassium Chloride ER 20 MEQ TBCR Take 3 tablets (60 mEq) by mouth 2 times daily   warfarin (COUMADIN) 2 MG tablet Take 6mg on 1/2, then 4mg daily until you get your INR checked on 1/4 or 1/5.   Cyanocobalamin 1000 MCG CAPS Take 1,000 Units by mouth daily   DULoxetine (CYMBALTA) 30 MG EC capsule 3 capsules (90 mg) by Oral or Feeding Tube route daily   ondansetron (ZOFRAN-ODT) 4 MG ODT tab Take 1 tablet (4 mg) by mouth every 6 hours as needed for nausea   sodium chloride, PF, 0.9% PF flush 3 mLs by Intracatheter route every hour as needed for line flush   thiamine 100 MG tablet Take 1 tablet (100 mg) by mouth daily   busPIRone 30 MG TABS Take 30 mg by mouth 3 times daily   Blood Pressure Monitor KIT 1 each daily   digoxin (LANOXIN) 125 MCG tablet Take 1 tablet (125 mcg) by mouth daily   hydrOXYzine (ATARAX) 50 MG tablet Take 1 tablet (50 mg) by mouth 3 times daily   polyethylene glycol (MIRALAX/GLYCOLAX) packet Take 1 packet by mouth daily    melatonin 3 MG tablet Take 1 tablet (3 mg) by mouth nightly as needed for sleep (Patient taking differently: Take 3 mg by mouth At Bedtime )   acetaminophen (TYLENOL) 325 MG tablet Take 1 tablet (325 mg) by mouth every 4 hours as needed for mild pain or fever   ferrous sulfate (IRON) 325 (65 FE) MG tablet Take 1 tablet (325 mg) by mouth 2 times daily   multivitamin, therapeutic with minerals (THERA-VIT-M) TABS Take 1 tablet by mouth daily   ascorbic acid (VITAMIN C) 500 MG tablet  "Take 2 tablets (1,000 mg) by mouth daily   Cholecalciferol (VITAMIN D) 2000 UNITS tablet Take 2,000 Units by mouth daily     No current facility-administered medications on file prior to visit.        /73 mmHg  Pulse 103  Ht 1.6 m (5' 3\")  Wt 72.122 kg (159 lb)  BMI 28.17 kg/m2  SpO2 99%  Ill appearing young woman  Lungs clear  RRR no RGM, JVP elevated to the jaw  Abdomen round soft  Extremities cool, clammy    Last Basic Metabolic Panel:  NA      135   1/6/2017   POTASSIUM      4.5   1/6/2017  CHLORIDE      100   1/6/2017  MARIO      9.3   1/6/2017  CO2       22   1/6/2017  BUN       29   1/6/2017  CR     1.34   1/6/2017  GLC      112   1/6/2017    INR     3.32   1/6/2017  INR     2.19   1/2/2017  INR     2.34   1/1/2017  INR     3.20   12/31/2016  INR     3.42   12/30/2016  INR     2.52   12/29/2016  INR     2.12   12/28/2016  INR     2.41   12/27/2016  INR     2.89   12/26/2016  INR     3.67   12/25/2016  INR     2.14   12/20/2016  INR     2.21   12/19/2016  INR      1.1   10/6/2016      Assessment and Plan:   Anne is a pleasant 26 year old woman with severe nonischemic cardiomyopathy and advanced heart failure secondary to substance abuse. We had administered 160 mg of IV lasix in clinic hoping to stimulate some diuresis. Her dobutamine infusions was paused while the Lasix was pushed. After administering it, she felt lightheaded and nauseated. She was reclined when her color changed, she was sluggish to respond, and she began vomiting. Oxygen was applied. Pulses were fleeting but palpable. Blood pressure, once obtained, was in the 80's. Paramedics arrived and transported Anne to the hospital.      At least 40  Minutes in direct care, >50% in counseling         CC  Patient Care Team:  Yusuf Elizabeth as PCP - General (Family Practice)  Natalie Gaston, RN as Nurse Coordinator (Cardiology)  Terri Garsia NP as Nurse Practitioner (Cardiology)  Pearl Francisco RN as Nurse " Coordinator  Kiesha Romero, RN as Nurse Coordinator (Cardiology)  Mayte Chaidez MD as MD (Cardiology)  [facility]

## 2017-01-06 NOTE — MR AVS SNAPSHOT
After Visit Summary   1/6/2017    Martina Eugene    MRN: [id number]           Patient Information     Date Of Birth          1990        Visit Information        Provider Department      1/6/2017 10:30 AM Terri Garsia NP [facility]        Today's Diagnoses     Chronic systolic heart failure (H)    -  1       Care Instructions    You were seen today in the Cardiovascular Clinic at the [facility].     Cardiology Providers you saw during your visit: Terri Garsia NP        1.  We will be giving you 160mg IV lasix in clinic today thru your PICC line.  2.  Please take potassium 60mEqs three times today.  Then return to potassium 40mEqs three times daily.      If you weight is still greater than or equal to 155lbs this weekend  - please call the on On-Call Cardiologist in the Hospital    If your weight is still greater than or equal to 155lbs on Monday then take Torsemide 100mg twice daily and potassium 60mEqs three times daily.  If your weight persists despite this dosage increase then please call us.    If your weight is less than 155lbs then continue torsemide 80mg twice daily and potassium 40mEqs three times daily.         Results for MARTINA EUGENE (MRN [id number]) as of 1/6/2017 11:26   Ref. Range 1/6/2017 10:11   Sodium Latest Ref Range: 133-144 mmol/L 135   Potassium Latest Ref Range: 3.4-5.3 mmol/L 4.5   Chloride Latest Ref Range:  mmol/L 100   Carbon Dioxide Latest Ref Range: 20-32 mmol/L 22   Urea Nitrogen Latest Ref Range: 7-30 mg/dL 29   Creatinine Latest Ref Range: 0.52-1.04 mg/dL 1.34 (H)   GFR Estimate Latest Ref Range: >60 mL/min/1.7m2 48 (L)   GFR Estimate If Black Latest Ref Range: >60 mL/min/1.7m2 59 (L)   Calcium Latest Ref Range: 8.5-10.1 mg/dL 9.3   Anion Gap Latest Ref Range: 3-14 mmol/L 12   Glucose Latest Ref Range: 70-99 mg/dL 112 (H)   INR Latest Ref Range: 0.86-1.14  3.32 (H)         Please limit your fluid intake to 2 L (64 ounces) daily.  2  "Liters a day = 8.5 cups, or 72 ounces.  Please limit your salt intake to 2 grams a day or less.    If you gain 2# in 24 hours or 5# in one week call Natalie Gaston RN so we can adjust your medications as needed over the phone.    Please feel free to call me with any questions or concerns.      Natalie Gaston RN BSN CHFN  [facility]  Cardiology Care Coordinator-Heart Failure Clinic    Questions and [phone].   First press #1 for the University and then press #3 for \"Medical Questions\" to reach us Cardiology Nurses.     On Call Cardiologist for after hours or on weekends: [phone]   option #4 and ask to speak to the on-call Cardiologist. Inform them you are a CORE/heart failure patient at the [facility].        If you need a medication refill please contact your pharmacy.  Please allow 3 business days for your refill to be completed.  _______________________________________________________  C.O.R.E[facility] CLINIC Cardiomyopathy, Optimization, Rehabilitation, Education   The C.O.R.E. CLINIC is a heart failure specialty clinic within the [facility] where you will work with specialized nurse practioners dedicated to helping patients with heart failure carefully adjust medications, receive education, and learn who and when to call if symptoms develop. They specialize in helping you better understand your condition, slow the progression of your disease, improve the length and quality of your life, help you detect future heart problems before they become life threatening, and avoid hospitalizations.  As always, thank you for trusting us with your health care needs!             Follow-ups after your visit        Your next 10 appointments already scheduled     2017  6:45 AM   Lab with [facility] ([facility])    [address] [address] [phone]            , " "2017  7:00 AM   Ech Complete with [id number]   [facility] ([facility])    [address] [place] [address] [phone]           1.  Please bring or wear a comfortable two-piece outfit. 2.  You may eat, drink and take your normal medicines. 3.  For any questions that cannot be answered, please contact the ordering physician            Jan 26, 2017  8:00 AM   (Arrive by 7:45 AM)   RETURN HEART FAILURE with Mayte Chaidez MD   [facility] ([facility])    [address] [place] [id number] [phone]            Jan 26, 2017  8:30 AM   (Arrive by 8:15 AM)   Implanted Defibulator with Uc Cv Device 1   [facility] ([facility])    [address] [place] [id number] [phone]              Who to contact     If you have questions or need follow up information about today's clinic visit or your schedule please contact [facility] directly at [phone].  Normal or non-critical lab and imaging results will be communicated to you by MyChart, letter or phone within 4 business days after the clinic has received the results. If you do not hear from us within 7 days, please contact the clinic through MyChart or phone. If you have a critical or abnormal lab result, we will notify you by phone as soon as possible.  Submit refill requests through "[employer] or call your pharmacy and they will forward the refill request to us. Please allow 3 business days for your refill to be completed.          Additional Information About Your Visit        Care EveryWhere ID     This is your Care EveryWhere ID. This could be used by other organizations to access your [facility] medical records  [id number]        Your Vitals Were     Pulse Height BMI (Body Mass Index) Pulse Oximetry          103 1.6 m (5' 3\") 28.17 kg/m2 99%         Blood Pressure from Last 3 " Encounters:   01/06/17 106/73   01/02/17 104/71   12/20/16 109/78    Weight from Last 3 Encounters:   01/06/17 72.122 kg (159 lb)   01/02/17 70.761 kg (156 lb)   12/20/16 73.029 kg (161 lb)              Today, you had the following     No orders found for display         Today's Medication Changes          These changes are accurate as of: 1/6/17 12:12 PM.  If you have any questions, ask your nurse or doctor.               Start taking these medicines.        Dose/Directions    furosemide 10 MG/ML injection   Commonly known as:  LASIX   Used for:  Chronic systolic heart failure (H)   Started by:  Terri Garsia, JUVENTINO        Dose:  160 mg   Inject 16 mLs (160 mg) into the vein once   Quantity:  16 mL   Refills:  0         These medicines have changed or have updated prescriptions.        Dose/Directions    melatonin 3 MG tablet   This may have changed:  when to take this   Used for:  Insomnia, unspecified type        Dose:  3 mg   Take 1 tablet (3 mg) by mouth nightly as needed for sleep   Quantity:  60 tablet   Refills:  2            Where to get your medicines      Some of these will need a paper prescription and others can be bought over the counter.  Ask your nurse if you have questions.     Bring a paper prescription for each of these medications    - furosemide 10 MG/ML injection             Primary Care Provider Office Phone # Fax #    Yusuf Elizabeth [phone]       [place] [address] [facility] [address]        Thank you!     Thank you for choosing [facility]  for your care. Our goal is always to provide you with excellent care. Hearing back from our patients is one way we can continue to improve our services. Please take a few minutes to complete the written survey that you may receive in the mail after your visit with us. Thank you!             Your Updated Medication List - Protect others around you: Learn how to safely use, store and throw away your medicines  at www.disposemymeds.org.          This list is accurate as of: 1/6/17 12:12 PM.  Always use your most recent med list.                   Brand Name Dispense Instructions for use    acetaminophen 325 MG tablet    TYLENOL    100 tablet    Take 1 tablet (325 mg) by mouth every 4 hours as needed for mild pain or fever       ALDACTONE 25 MG tablet   Generic drug:  spironolactone     15 tablet    Take 1 tablet (25 mg) by mouth daily       ascorbic acid 500 MG tablet    VITAMIN C    120 tablet    Take 2 tablets (1,000 mg) by mouth daily       Blood Pressure Monitor Kit     1 kit    1 each daily       BusPIRone HCl 30 MG Tabs     90 tablet    Take 30 mg by mouth 3 times daily       Cyanocobalamin 1000 MCG Caps     60 capsule    Take 1,000 Units by mouth daily       D5W 5 % SOLN 170 mL with DOBUTamine 250 MG/20ML SOLN 1,000 mg     250 mL    Inject 0.36 mg/min into the vein continuous       digoxin 125 MCG tablet    LANOXIN    30 tablet    Take 1 tablet (125 mcg) by mouth daily       DULoxetine 30 MG EC capsule    CYMBALTA    30 capsule    3 capsules (90 mg) by Oral or Feeding Tube route daily       ferrous sulfate 325 (65 FE) MG tablet    IRON    120 tablet    Take 1 tablet (325 mg) by mouth 2 times daily       furosemide 10 MG/ML injection    LASIX    16 mL    Inject 16 mLs (160 mg) into the vein once       hydrOXYzine 50 MG tablet    ATARAX    90 tablet    Take 1 tablet (50 mg) by mouth 3 times daily       melatonin 3 MG tablet     60 tablet    Take 1 tablet (3 mg) by mouth nightly as needed for sleep       multivitamin, therapeutic with minerals Tabs tablet     60 tablet    Take 1 tablet by mouth daily       ondansetron 4 MG ODT tab    ZOFRAN-ODT    120 tablet    Take 1 tablet (4 mg) by mouth every 6 hours as needed for nausea       polyethylene glycol Packet    MIRALAX/GLYCOLAX     Take 1 packet by mouth daily       Potassium Chloride ER 20 MEQ Tbcr     180 tablet    Take 3 tablets (60 mEq) by mouth 2 times daily        sodium chloride (PF) 0.9% PF flush     100 mL    3 mLs by Intracatheter route every hour as needed for line flush       thiamine 100 MG tablet     60 tablet    Take 1 tablet (100 mg) by mouth daily       torsemide 20 MG tablet    DEMADEX    240 tablet    Take 4 tablets (80 mg) by mouth 2 times daily       triamcinolone 0.1 % cream    KENALOG     Apply topically 2 times daily       vitamin D 2000 UNITS tablet     60 tablet    Take 2,000 Units by mouth daily       warfarin 2 MG tablet    COUMADIN    30 tablet    Take 6mg on 1/2, then 4mg daily until you get your INR checked on 1/4 or 1/5.

## 2017-01-06 NOTE — IP AVS SNAPSHOT
[address] [address]    Phone:  [phone]                                       After Visit Summary   1/6/2017    Anne Eugene    MRN: [id number]           After Visit Summary Signature Page     I have received my discharge instructions, and my questions have been answered. I have discussed any challenges I see with this plan with the nurse or doctor.    ..........................................................................................................................................  Patient/Patient Representative Signature      ..........................................................................................................................................  Patient Representative Print Name and Relationship to Patient    ..................................................               ................................................  Date                                            Time    ..........................................................................................................................................  Reviewed by Signature/Title    ...................................................              ..............................................  Date                                                            Time

## 2017-01-06 NOTE — PROGRESS NOTES
Rapid Response Epic Documentation     Situation: Patient has history of CHF.     Plan: Get patient to ED     Location:Cardiac clinic    Disposition:      Transfer to Emergency Room Via: 911    Protocol Used:     NO Rapid response intervention needed     Kiesha Hall RN

## 2017-01-06 NOTE — IP AVS SNAPSHOT
MRN:[id number]                      After Visit Summary   1/6/2017    Anne Eugene    MRN: [id number]           Thank you!     Thank you for choosing [facility] for your care. Our goal is always to provide you with excellent care. Hearing back from our patients is one way we can continue to improve our services. Please take a few minutes to complete the written survey that you may receive in the mail after you visit with us. Thank you!        Patient Information     Date Of Birth          1990        About your hospital stay     You were admitted on:  January 6, 2017 You last received care in the:  [address] [place] [address]    You were discharged on:  January 20, 2017        Reason for your hospital stay       You were admitted for heart failure exacerbation. During the hospital stay, you decompensated requiring transfer to ICU and close monitoring of your medications (dobutamine/ nipride). You are transferred to [facility] for another opinion regarding the heart pump (VAD).                  Who to Call     For medical emergencies, please call 911.  For non-urgent questions about your medical care, please call your primary care provider or clinic, [phone]          Attending Provider     Provider    Elvira Storey MD       Primary Care Provider Office Phone # Fax #    Yusuf JOVAN Elizabeth [phone]       [facility] [address] [facility] [address]        After Care Instructions     Activity - Up ad ajay           Daily weights       Call Provider for weight gain of more than 2 pounds per day or 5 pounds per week.            Diet       Follow this diet upon discharge: 2 Gram Sodium Diet, 1500ml fluid restriction            General info for SNF       Length of Stay Estimate: Short Term Care: Estimated # of Days <30  Condition at Discharge: Stable  Rehabilitation Potential: good  Admission H&P remains valid and up-to-date: Yes  Recent Chemotherapy: N/A             Intake and output       Every shift                  Your next 10 appointments already scheduled     Jan 26, 2017  6:45 AM   Lab with UC LAB   [facility] ([facility])    [address] [facility] [address]  [address] [facility] [id number] [phone]            Jan 26, 2017  7:00 AM   Ech Complete with [id number]   [facility] Echo ([facility])    [address] [place] [id number] [phone]           1.  Please bring or wear a comfortable two-piece outfit. 2.  You may eat, drink and take your normal medicines. 3.  For any questions that cannot be answered, please contact the ordering physician            Jan 26, 2017  8:00 AM   (Arrive by 7:45 AM)   RETURN HEART FAILURE with Mayte Chaidez MD   [facility] ([facility])    [address] [place] [id number] [phone]            Jan 26, 2017  8:30 AM   (Arrive by 8:15 AM)   Implanted Defibulator with  Cv Device 1   [facility] ([facility])    [address] [id number] [phone]              Additional Services     Home infusion referral       Your provider has referred you to:  [facility]  Phone: [phone]  Fax: [phone]     For resumption of home Dobutamine drip and line care, INR lab draws.      Agency Staff to assess nursing needs for Infusion Therapy.            Medication Therapy Management Referral       Reason for referral:  on more than 5 medications and managing chronic disease and on more than 10 medications and hospitalized or in the ED in the past 6 months     This service is designed to help you get the most from your medications.  A specially trained pharmacist will work closely with you and your doctors  to solve any problems related to your medications and to help you get the   best results from taking  "them.      The Medication Therapy Management staff will call you to schedule an appointment.                  Pending Results     Date and Time Order Name Status Description    1/19/2017 1033 Thiocyanate In process             Statement of Approval     Ordered          01/20/17 1212  I have reviewed and agree with all the recommendations and orders detailed in this document.   EFFECTIVE NOW     Approved and electronically signed by:  Pattie Domingo MD             Admission Information        Provider Department Dept Phone    1/6/2017 Forum YARON Storey MD [facility] [phone]      Your Vitals Were     Blood Pressure Pulse Temperature    100/58 mmHg 81 97.9  F (36.6  C) (Axillary)    Respirations Height Weight    14 1.6 m (5' 3\") 69.9 kg (154 lb 1.6 oz)    BMI (Body Mass Index) Pulse Oximetry       27.30 kg/m2 96%       Care EveryWhere ID     This is your Care EveryWhere ID. This could be used by other organizations to access your [facility] medical records  [id number]           Review of your medicines      START taking        Dose / Directions    bumetanide 0.25 MG/ML infusion   Commonly known as:  BUMEX   Used for:  Cardiogenic shock (H)        Dose:  1.5 mg/hr   Inject 1.5 mg/hr into the vein continuous   Refills:  0       cyclobenzaprine 10 MG tablet   Commonly known as:  FLEXERIL   Used for:  Muscle spasm        Dose:  10 mg   Take 1 tablet (10 mg) by mouth 3 times daily as needed for muscle spasms   Quantity:  42 tablet   Refills:  0       hydrALAZINE 100 MG Tabs tablet   Commonly known as:  APRESOLINE   Used for:  Cardiogenic shock (H)        Dose:  100 mg   Take 1 tablet (100 mg) by mouth 4 times daily   Quantity:  120 tablet   Refills:  0       isosorbide dinitrate 5 MG tablet   Commonly known as:  ISORDIL   Used for:  Cardiogenic shock (H)        Dose:  30 mg   Take 6 tablets (30 mg) by mouth 3 times daily   Refills:  0       nitroprusside (NIPRIDE) 0.4 mg/mL, sodium thiosulfate 4 mg/mL in " D5W 125 mL infusion   Used for:  Cardiogenic shock (H)        Dose:  0.5 mcg/kg/min   Inject 36.5 mcg/min into the vein continuous   Refills:  0       pantoprazole 40 MG EC tablet   Commonly known as:  PROTONIX   Used for:  Gastroesophageal reflux disease, esophagitis presence not specified        Dose:  40 mg   Take 1 tablet (40 mg) by mouth every morning   Quantity:  30 tablet   Refills:  0         CONTINUE these medicines which may have CHANGED, or have new prescriptions. If we are uncertain of the size of tablets/capsules you have at home, strength may be listed as something that might have changed.        Dose / Directions    melatonin 3 MG tablet   This may have changed:  when to take this   Used for:  Insomnia, unspecified type        Dose:  3 mg   Take 1 tablet (3 mg) by mouth nightly as needed for sleep   Quantity:  60 tablet   Refills:  2         CONTINUE these medicines which have NOT CHANGED        Dose / Directions    acetaminophen 325 MG tablet   Commonly known as:  TYLENOL   Used for:  Nonintractable episodic headache, unspecified headache type        Dose:  325 mg   Take 1 tablet (325 mg) by mouth every 4 hours as needed for mild pain or fever   Quantity:  100 tablet   Refills:  2       ascorbic acid 500 MG tablet   Commonly known as:  VITAMIN C   Used for:  Nutritional deficiency        Dose:  1000 mg   Take 2 tablets (1,000 mg) by mouth daily   Quantity:  120 tablet   Refills:  2       Blood Pressure Monitor Kit   Used for:  Chronic systolic heart failure (H)        Dose:  1 each   1 each daily   Quantity:  1 kit   Refills:  0       BusPIRone HCl 30 MG Tabs        Dose:  30 mg   Take 30 mg by mouth 3 times daily   Quantity:  90 tablet   Refills:  0       Cyanocobalamin 1000 MCG Caps   Used for:  Nutritional deficiency        Dose:  1000 Units   Take 1,000 Units by mouth daily   Quantity:  60 capsule   Refills:  2       D5W 5 % SOLN 170 mL with DOBUTamine 250 MG/20ML SOLN 1,000 mg   Used for:   Chronic systolic heart failure (H)        Dose:  5 mcg/kg/min   Inject 0.36 mg/min into the vein continuous   Quantity:  250 mL   Refills:  11       digoxin 125 MCG tablet   Commonly known as:  LANOXIN   Used for:  Chronic systolic heart failure (H)        Dose:  125 mcg   Take 1 tablet (125 mcg) by mouth daily   Quantity:  30 tablet   Refills:  3       DULoxetine 30 MG EC capsule   Commonly known as:  CYMBALTA   Used for:  Episode of recurrent major depressive disorder, unspecified depression episode severity (H)        Dose:  90 mg   3 capsules (90 mg) by Oral or Feeding Tube route daily   Quantity:  30 capsule   Refills:  3       ferrous sulfate 325 (65 FE) MG tablet   Commonly known as:  IRON   Used for:  Iron deficiency anemia, unspecified iron deficiency anemia type        Dose:  325 mg   Take 1 tablet (325 mg) by mouth 2 times daily   Quantity:  120 tablet   Refills:  2       hydrOXYzine 50 MG tablet   Commonly known as:  ATARAX   Used for:  Anxiety        Dose:  50 mg   Take 1 tablet (50 mg) by mouth 3 times daily   Quantity:  90 tablet   Refills:  0       multivitamin, therapeutic with minerals Tabs tablet   Used for:  Nutritional deficiency        Dose:  1 tablet   Take 1 tablet by mouth daily   Quantity:  60 tablet   Refills:  2       ondansetron 4 MG ODT tab   Commonly known as:  ZOFRAN-ODT   Used for:  Nausea and vomiting, intractability of vomiting not specified, unspecified vomiting type        Dose:  4 mg   Take 1 tablet (4 mg) by mouth every 6 hours as needed for nausea   Quantity:  120 tablet   Refills:  0       polyethylene glycol Packet   Commonly known as:  MIRALAX/GLYCOLAX        Dose:  1 packet   Take 1 packet by mouth daily   Refills:  0       Potassium Chloride ER 20 MEQ Tbcr   Used for:  Chronic systolic heart failure (H)        Dose:  60 mEq   Take 3 tablets (60 mEq) by mouth 2 times daily   Quantity:  180 tablet   Refills:  3       sodium chloride (PF) 0.9% PF flush   Used for:  PICC  (peripherally inserted central catheter) in place        Dose:  3 mL   3 mLs by Intracatheter route every hour as needed for line flush   Quantity:  100 mL   Refills:  3       thiamine 100 MG tablet   Used for:  Chronic systolic heart failure (H)        Dose:  100 mg   Take 1 tablet (100 mg) by mouth daily   Quantity:  60 tablet   Refills:  0       vitamin D 2000 UNITS tablet   Used for:  Nutritional deficiency        Dose:  2000 Units   Take 2,000 Units by mouth daily   Quantity:  60 tablet   Refills:  2       warfarin 2 MG tablet   Commonly known as:  COUMADIN   Used for:  Other chronic pulmonary embolism without acute cor pulmonale (H)        Take 6mg on 1/2, then 4mg daily until you get your INR checked on 1/4 or 1/5.   Quantity:  30 tablet   Refills:  3         STOP taking     ALDACTONE 25 MG tablet   Generic drug:  spironolactone           furosemide 10 MG/ML injection   Commonly known as:  LASIX           torsemide 20 MG tablet   Commonly known as:  DEMADEX           triamcinolone 0.1 % cream   Commonly known as:  KENALOG                Where to get your medicines      Some of these will need a paper prescription and others can be bought over the counter. Ask your nurse if you have questions.     You don't need a prescription for these medications    - bumetanide 0.25 MG/ML infusion  - cyclobenzaprine 10 MG tablet  - hydrALAZINE 100 MG Tabs tablet  - isosorbide dinitrate 5 MG tablet  - nitroprusside (NIPRIDE) 0.4 mg/mL, sodium thiosulfate 4 mg/mL in D5W 125 mL infusion  - pantoprazole 40 MG EC tablet             Protect others around you: Learn how to safely use, store and throw away your medicines at www.disposemymeds.org.             Medication List: This is a list of all your medications and when to take them. Check marks below indicate your daily home schedule. Keep this list as a reference.      Medications           Morning Afternoon Evening Bedtime As Needed    acetaminophen 325 MG tablet   Commonly  known as:  TYLENOL   Take 1 tablet (325 mg) by mouth every 4 hours as needed for mild pain or fever   Last time this was given:  650 mg on 1/20/2017  9:59 AM                                ascorbic acid 500 MG tablet   Commonly known as:  VITAMIN C   Take 2 tablets (1,000 mg) by mouth daily                                Blood Pressure Monitor Kit   1 each daily                                bumetanide 0.25 MG/ML infusion   Commonly known as:  BUMEX   Inject 1.5 mg/hr into the vein continuous   Last time this was given:  1.5 mg/hr on 1/20/2017 11:00 AM                                BusPIRone HCl 30 MG Tabs   Take 30 mg by mouth 3 times daily   Last time this was given:  30 mg on 1/20/2017  8:39 AM                                Cyanocobalamin 1000 MCG Caps   Take 1,000 Units by mouth daily                                cyclobenzaprine 10 MG tablet   Commonly known as:  FLEXERIL   Take 1 tablet (10 mg) by mouth 3 times daily as needed for muscle spasms   Last time this was given:  10 mg on 1/19/2017  4:26 PM                                D5W 5 % SOLN 170 mL with DOBUTamine 250 MG/20ML SOLN 1,000 mg   Inject 0.36 mg/min into the vein continuous   Last time this was given:  1/20/2017 11:00 AM                                digoxin 125 MCG tablet   Commonly known as:  LANOXIN   Take 1 tablet (125 mcg) by mouth daily   Last time this was given:  62.5 mcg on 1/20/2017  8:41 AM                                DULoxetine 30 MG EC capsule   Commonly known as:  CYMBALTA   3 capsules (90 mg) by Oral or Feeding Tube route daily   Last time this was given:  120 mg on 1/20/2017  8:37 AM                                ferrous sulfate 325 (65 FE) MG tablet   Commonly known as:  IRON   Take 1 tablet (325 mg) by mouth 2 times daily   Last time this was given:  325 mg on 1/20/2017  8:39 AM                                hydrALAZINE 100 MG Tabs tablet   Commonly known as:  APRESOLINE   Take 1 tablet (100 mg) by mouth 4 times daily    Last time this was given:  100 mg on 1/20/2017 12:24 PM                                hydrOXYzine 50 MG tablet   Commonly known as:  ATARAX   Take 1 tablet (50 mg) by mouth 3 times daily   Last time this was given:  50 mg on 1/20/2017  8:39 AM                                isosorbide dinitrate 5 MG tablet   Commonly known as:  ISORDIL   Take 6 tablets (30 mg) by mouth 3 times daily   Last time this was given:  1/20/2017  8:38 AM                                melatonin 3 MG tablet   Take 1 tablet (3 mg) by mouth nightly as needed for sleep                                multivitamin, therapeutic with minerals Tabs tablet   Take 1 tablet by mouth daily   Last time this was given:  1 tablet on 1/20/2017 12:24 PM                                nitroprusside (NIPRIDE) 0.4 mg/mL, sodium thiosulfate 4 mg/mL in D5W 125 mL infusion   Inject 36.5 mcg/min into the vein continuous   Last time this was given:  1/20/2017 11:00 AM                                ondansetron 4 MG ODT tab   Commonly known as:  ZOFRAN-ODT   Take 1 tablet (4 mg) by mouth every 6 hours as needed for nausea   Last time this was given:  4 mg on 1/18/2017  7:08 PM                                pantoprazole 40 MG EC tablet   Commonly known as:  PROTONIX   Take 1 tablet (40 mg) by mouth every morning   Last time this was given:  40 mg on 1/20/2017  8:39 AM                                polyethylene glycol Packet   Commonly known as:  MIRALAX/GLYCOLAX   Take 1 packet by mouth daily   Last time this was given:  17 g on 1/13/2017  8:37 AM                                Potassium Chloride ER 20 MEQ Tbcr   Take 3 tablets (60 mEq) by mouth 2 times daily                                sodium chloride (PF) 0.9% PF flush   3 mLs by Intracatheter route every hour as needed for line flush   Last time this was given:  500 mLs on 1/18/2017  7:41 PM                                thiamine 100 MG tablet   Take 1 tablet (100 mg) by mouth daily   Last time this was  given:  100 mg on 1/20/2017 12:24 PM                                vitamin D 2000 UNITS tablet   Take 2,000 Units by mouth daily                                warfarin 2 MG tablet   Commonly known as:  COUMADIN   Take 6mg on 1/2, then 4mg daily until you get your INR checked on 1/4 or 1/5.   Last time this was given:  5 mg on 1/19/2017  5:04 PM

## 2017-01-06 NOTE — LETTER
"1/6/2017      RE: Anne Eugene  [address] [place] [facility] [address]       Dear Colleague,    Thank you for the opportunity to participate in the care of your patient, Anne Eugene, at the [facility]. Please see a copy of my visit note below.    Bloating  +nausea  Appetite fair    Weight up at least 2 pounds by home scale to 155 pounds, discharge weight at home was 153    Chest pressure with any activity    More SOB  Weakness and fatigue    2 pillows. No PND  No edema    Lightheaded with vomiting or with standing too long  No syncope or falls since discharge    No bleeding or focal deficits    PICC line infusing without pump issues      Pressure last night was 78/42    /73 mmHg  Pulse 103  Ht 1.6 m (5' 3\")  Wt 72.122 kg (159 lb)  BMI 28.17 kg/m2  SpO2 99%      Last Basic Metabolic Panel:  NA      135   1/6/2017   POTASSIUM      4.5   1/6/2017  CHLORIDE      100   1/6/2017  MARIO      9.3   1/6/2017  CO2       22   1/6/2017  BUN       29   1/6/2017  CR     1.34   1/6/2017  GLC      112   1/6/2017    INR     3.32   1/6/2017  INR     2.19   1/2/2017  INR     2.34   1/1/2017  INR     3.20   12/31/2016  INR     3.42   12/30/2016  INR     2.52   12/29/2016  INR     2.12   12/28/2016  INR     2.41   12/27/2016  INR     2.89   12/26/2016  INR     3.67   12/25/2016  INR     2.14   12/20/2016  INR     2.21   12/19/2016  INR      1.1   10/6/2016    A/P  160 of IV lasix in clinic and take potassium 60 TID today  If weight at home goes up 2 pounds, increase torsemide to 100 mg BID and increase potassium to 60 TID  If weight is up the following day, call us  If weight is down the following day, resume 80 mg BID and resume potassium 40 TID        Please do not hesitate to contact me if you have any questions/concerns.     Sincerely,     Terri Garsia NP    "

## 2017-01-06 NOTE — H&P
[facility] Cardiology History and Physical    Anne Eugene MRN# [id number]   Age: 26 year old YOB: 1990     Date of Admission:  1/6/2017    Primary care provider: Yusuf Elizabeth          Assessment and Plan:   Anne Eugene is a 26 year old female with PMHx significant for NICM secondary to polysubstance abuse (methamphetamine, marijuana, tobacco; initially diagnosed 7/2016 upon transfer from OSH in severe cardiogenic shock, bilateral PEs, and shock liver), s/p ICD (10/12/16), 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 Patient's weight up 3 lbs, elevated JVP, nausea. This is consistent with her prior heart failure admission. Reports compliance with home medications and cardiac diet. This will be patient's 8th admission for heart failure since being diagnosed in July 2016. Anne is unable to be considered for advanced heart failure therapies, including LVAD and heart transplant, until she is clean for 1 year. Most recent known substance use was marijuana in October 2016.  - s/p 160mg IV lasix in clinic (home dose is torsemide 80mg BID)  - Adding NT-BNP, CXR  - If having good UOP, can transfer patient to 6c  - Daily weights, strict I&Os  - Continue dobutamine at fixed dose of 5mcg/kg/min  - Previously on lisinopril in the past, but stopped when dobutamine was started  - Beta blocker deferred due to low cardiac output  - Continue spironolactone 25mg qday  - Continue digoxin 125mcg qday   - KCL 40 mEq BID (down from home dose)    # CKD  Cr up to 1.34 from 1.14 on 1/2. Baseline range between 1.1-1.3  - BMP BID while diuresing    # Bilateral PE    # Supratherapeutic INR  PE diagnosed at  OSH 7/2016.  Anticoagulated with warfarin, goal INR 2-3. INR 3.32 on admission  - Warfarin  - Daily INR    # Acute on chronic normocytic anemia  Last iron studies suggest anemia of chronic disease, likely from cardiomyopathy. Hgb at baseline of ~ 11.0  - Repeat CBC  - Continue iron supplements    # Anxiety/Depression:     Given social circumstances surround her current medical status, was visibly upset multiple times during previous admission. Psychiatry saw her on 12/14 and recommended to increase cymbalta and for her to be referred back to her established psychiatrist at discharge with recommendations for follow up with psychotherapist.    - Continue buspar, cymbalta, atarax  - NO benzos    FEN: no IVF, monitor and replete lytes, NPO for now  Prophylaxis: on warfarin  Code Status: full  Disposition: cards 2 for further evaluation and diuresis    Patient seen and discussed with Dr. Storey, who agrees with above plan.    Tito Bender MD  PGY2  Pager: [phone]          Chief Complaint:   Weight gain, nausea         History of Present Illness:   Anne Eugene is a 26 year old female with PMHx significant for NICM secondary to polysubstance abuse (methamphetamine, marijuana, tobacco; initially diagnosed 7/2016 upon transfer from OSH in severe cardiogenic shock, bilateral PEs, and shock liver), s/p ICD (10/12/16), noninfectious aortic valve vegetation (per EVAN 8/4/16), anxiety, depression, and recent admission for heart failure, who presents with volume overload and nausea.    This will be patient's 8th admission for heart failure since being diagnosed in July 2016. She had a known history of HFrEF presumed to be secondary to polysubstance abuse, including methamphetamine, heroin, marijuana, and prescription narcotics. The patient is unable to be considered for advanced heart failure therapies, including LVAD and heart transplant, until she is clean for 1 year. Most recent known substance use was marijuana in October 2016.  Last admission 12/25/16 - 1/2/17 for ROBERT and acute on chronic heart failure. She was IV diuresed to her dry weight of around 156 pounds and was feeling good.    A few days after discharge, reported some weight gain, ~3 pounds per mom, and worsening nausea and vomiting, dyspnea on exertion, which is how she typically presents in heart failure. Reports bloated feeling in stomach. Orthopnea at baseline of 2 pillows. No PND. Occasional chills at night, no fevers, URI, or other infectious symptoms. No further syncopal events since last admission. Was in clinic today and patient's dobutamine was turned down and patient started to feel markedly worse. It was also noted that her weight gain was causing worsening symptoms, so decisions was made to admit to hospital for IV diuresis. Got 160mg IV in clinic.           Review of Systems:   10 point ROS negative unless noted above         Past Medical History:     Last Echocardiogram: 11/24/16  Interpretation Summary  The Ejection Fraction is estimated at 20-25%.  Global right ventricular function is moderately to severely reduced.    Last Catheterization: RHC on 12/13/16  On Dobtuamine 2.5 mcg/kg/min:    BP (Cuff) 107/67 (81)    RA 15, 13, 10  RV 54/14  PA 54/27 (40)  PCW 28  Keely CO 3.5  Keely CI 2.0  TD CO 3.7  TD CI 2.0  PA sat 57.7%  PCW sat 95.7%  Hgb 10.8 g/dL  PVR 3.1  SVR 1535    Summary    1. Elevated right-sided (RA 10) and left-sided (PCW 28) filling pressures  2. Moderate pre and post capillary pulmonary venous hypertension (mPA 40)  3. Low normal cardiac output (3.7) and cardiac index 2.0) while on dobutamine 2.5 mcg/kg/min    Medical History reviewed.   Past Medical History   Diagnosis Date     Polysubstance abuse      ADHD (attention deficit hyperactivity disorder)      Anxiety      Nonischemic cardiomyopathy (H)      secondary to polysubstance abuse     ICD (implantable cardioverter-defibrillator) in place 10/20/2016     Chronic systolic heart failure (H)  9/26/2016     Pulmonary embolism (H) 10/20/2016     Bilateral-on coumadin     Aortic valve vegetation 10/20/2016     Not thought to be infectious by ID             Past Surgical History:   Surgical History reviewed.   Past Surgical History   Procedure Laterality Date     C insert electrd leads/repostion  10/12/2016                   Social History:   Social History reviewed.   Social History   Substance Use Topics     Smoking status: Never Smoker      Smokeless tobacco: Not on file     Alcohol Use: No             Family History:   Family History reviewed.  No family history on file.          Allergies:     Allergies   Allergen Reactions     Amoxicillin Hives     Per previous records     Ceclor [Cefaclor] Hives     Per previous records     Clindamycin      THROWING UP             Medications:   Medications Reviewed.   Current Facility-Administered Medications   Medication     busPIRone (BUSPAR) tablet 30 mg     DOBUTamine (DOBUTREX) 1,000 mg in D5W 250 mL infusion (adult max conc)     digoxin (LANOXIN) tablet 125 mcg     DULoxetine (CYMBALTA) EC capsule 90 mg     ferrous sulfate (IRON) tablet 325 mg     hydrOXYzine (ATARAX) tablet 50 mg     melatonin tablet 3 mg     polyethylene glycol (MIRALAX/GLYCOLAX) Packet 17 g     Potassium Chloride ER TBCR 20 mEq     Warfarin Therapy Reminder (Check START DATE - warfarin may be starting in the FUTURE)     lidocaine 1 % 1 mL     lidocaine (LMX4) kit     sodium chloride (PF) 0.9% PF flush 3 mL     sodium chloride (PF) 0.9% PF flush 3 mL     medication instruction     acetaminophen (TYLENOL) tablet 650 mg     acetaminophen (TYLENOL) Suppository 650 mg     Patient is already receiving anticoagulation with heparin, enoxaparin (LOVENOX), warfarin (COUMADIN)  or other anticoagulant medication     ondansetron (ZOFRAN-ODT) ODT tab 4 mg    Or     ondansetron (ZOFRAN) injection 4 mg             Physical Exam:   Vitals were reviewed.  Blood pressure 98/73, temperature 97.5  F (36.4  C),  temperature source Oral, resp. rate 16, SpO2 100 %.    General: NAD  HEENT: MMM, PERRLA, EOM intact  CV: regular tachycardia, + S1S2; JVD to angle of jaw  Resp: Clear to auscultation bilaterally, no wheezes, rhonchi  Abd: Soft, mildly distended, TTP in upper quadrants, BS+  Skin: Not jaundiced, no rash, no ecchymoses  Extremities: slightly cool, well perfused, palpable pulses, no edema  Neuro: A&Ox3, No lateralizing symptoms or focal neurologic deficits         Data:        ROUTINE LABS (Last four results)  CMP  Recent Labs  Lab 01/06/17  1011 01/02/17  0600 01/01/17  1752 01/01/17  0600    138 133 137   POTASSIUM 4.5 3.4 3.7 3.5   CHLORIDE 100 96 95 98   CO2 22 32 30 30   ANIONGAP 12 10 8 9   * 93 99 92   BUN 29 30 30 29   CR 1.34* 1.14* 1.22* 1.24*   GFRESTIMATED 48* 58* 54* 53*   GFRESTBLACK 59* 71 65 64   MARIO 9.3 9.1 8.8 9.2   MAG  --   --   --  2.2     CBC  Recent Labs  Lab 01/01/17  0600   WBC 5.7   RBC 4.13   HGB 12.0   HCT 38.1   MCV 92   MCH 29.1   MCHC 31.5   RDW 15.9*        INR  Recent Labs  Lab 01/06/17  1011 01/02/17  0600 01/01/17  0600 12/31/16  0645   INR 3.32* 2.19* 2.34* 3.20*

## 2017-01-06 NOTE — NURSING NOTE
Anne Eugene was seen in the CV clinic today by Terri Garsia NP with low output systolic heart failure. Anne is requiring inpatient care and will be directly admitted to the [facility] from the outpatient clinic. Anne Eugene will be admitted to  with Cards 2. Report was called to ED provider while we thought Anne would be going to the ER first.  Anne does require cardiac monitoring in route per Terri Garsia order. Anne was transported to  via wheelchair accompanied by EMS. Anne verbalizes understanding and agrees with plan of care.     Natalie Gaston RN

## 2017-01-06 NOTE — PHARMACY-ANTICOAGULATION SERVICE
Clinical Pharmacy - Warfarin Dosing Consult     Pharmacy has been consulted to manage this patient s warfarin therapy.  Indication: DVT/ PE Treatment  Therapy Goal: INR 2-3  Warfarin Prior to Admission: Yes  Warfarin PTA Regimen: take 6 mg on 1/2 then take 4 mg daily until INR check on 1/4 or 1/5  Recent documented change in oral intake/nutrition: No    INR   Date Value Ref Range Status   01/06/2017 3.32* 0.86 - 1.14 Final   01/02/2017 2.19* 0.86 - 1.14 Final     CHROMOGENIC FACTOR 10   Date Value Ref Range Status   07/30/2016 60* 70 - 130 % Final     Comment:     Therapeutic Range:  A Chromogenic Factor 10 level of approximately 20-40%   inversely correlates with an INR of 2-3 for patients receiving Warfarin.   Chromogenic Factor 10 levels below 20% indicate an INR greater than 3 and   levels above 40% indicate an INR less than 2.         Recommend warfarin 1 mg today.  Pharmacy will monitor Anne Oas daily and order warfarin doses to achieve specified goal.      Please contact pharmacy as soon as possible if the warfarin needs to be held for a procedure or if the warfarin goals change.      Kal Jimenez, PharmD

## 2017-01-06 NOTE — NURSING NOTE
Diet: Patient instructed regarding a heart failure healthy diet, including discussion of reduced fat and 2000 mg daily sodium restriction, daily weights, medication purpose and compliance, fluid restrictions and resources for patient and family to access for assistance with heart failure management.       Labs: Patient was given results of the laboratory testing obtained today and patient was instructed about when to return for the next laboratory testing.    Med Reconcile: Reviewed and verified all current medications with the patient. The updated medication list was printed and given to the patient.    Return Appointment: Patient given instructions regarding scheduling next clinic visit.     Patient stated she understood all health information given and agreed to call with further questions or concerns.    Natalie Gaston RN

## 2017-01-07 ENCOUNTER — APPOINTMENT (OUTPATIENT)
Dept: ULTRASOUND IMAGING | Facility: CLINIC | Age: 27
DRG: 286 | End: 2017-01-07
Attending: INTERNAL MEDICINE
Payer: COMMERCIAL

## 2017-01-07 LAB
AMPHETAMINES UR QL SCN: NORMAL
ANION GAP SERPL CALCULATED.3IONS-SCNC: 10 MMOL/L (ref 3–14)
ANION GAP SERPL CALCULATED.3IONS-SCNC: 8 MMOL/L (ref 3–14)
BARBITURATES UR QL: NORMAL
BUN SERPL-MCNC: 33 MG/DL (ref 7–30)
BUN SERPL-MCNC: 35 MG/DL (ref 7–30)
CALCIUM SERPL-MCNC: 8.4 MG/DL (ref 8.5–10.1)
CALCIUM SERPL-MCNC: 8.9 MG/DL (ref 8.5–10.1)
CHLORIDE SERPL-SCNC: 100 MMOL/L (ref 94–109)
CHLORIDE SERPL-SCNC: 102 MMOL/L (ref 94–109)
CO2 SERPL-SCNC: 24 MMOL/L (ref 20–32)
CO2 SERPL-SCNC: 28 MMOL/L (ref 20–32)
CREAT SERPL-MCNC: 1.69 MG/DL (ref 0.52–1.04)
CREAT SERPL-MCNC: 1.7 MG/DL (ref 0.52–1.04)
ETHANOL UR QL SCN: NORMAL
GFR SERPL CREATININE-BSD FRML MDRD: 37 ML/MIN/1.7M2
GFR SERPL CREATININE-BSD FRML MDRD: 37 ML/MIN/1.7M2
GLUCOSE SERPL-MCNC: 104 MG/DL (ref 70–99)
GLUCOSE SERPL-MCNC: 94 MG/DL (ref 70–99)
INR PPP: 3.84 (ref 0.86–1.14)
LACTATE BLD-SCNC: 1.7 MMOL/L (ref 0.7–2.1)
LACTATE BLD-SCNC: 3.2 MMOL/L (ref 0.7–2.1)
MAGNESIUM SERPL-MCNC: 2 MG/DL (ref 1.6–2.3)
MAGNESIUM SERPL-MCNC: 2.5 MG/DL (ref 1.6–2.3)
PCP UR QL SCN: NORMAL
POTASSIUM SERPL-SCNC: 4.1 MMOL/L (ref 3.4–5.3)
POTASSIUM SERPL-SCNC: 5.5 MMOL/L (ref 3.4–5.3)
POTASSIUM SERPL-SCNC: 5.7 MMOL/L (ref 3.4–5.3)
SODIUM SERPL-SCNC: 135 MMOL/L (ref 133–144)
SODIUM SERPL-SCNC: 136 MMOL/L (ref 133–144)

## 2017-01-07 PROCEDURE — 36415 COLL VENOUS BLD VENIPUNCTURE: CPT | Performed by: INTERNAL MEDICINE

## 2017-01-07 PROCEDURE — 84132 ASSAY OF SERUM POTASSIUM: CPT | Performed by: INTERNAL MEDICINE

## 2017-01-07 PROCEDURE — 25000125 ZZHC RX 250: Performed by: INTERNAL MEDICINE

## 2017-01-07 PROCEDURE — 25000132 ZZH RX MED GY IP 250 OP 250 PS 637: Performed by: INTERNAL MEDICINE

## 2017-01-07 PROCEDURE — 83605 ASSAY OF LACTIC ACID: CPT | Performed by: INTERNAL MEDICINE

## 2017-01-07 PROCEDURE — S0171 BUMETANIDE 0.5 MG: HCPCS | Performed by: STUDENT IN AN ORGANIZED HEALTH CARE EDUCATION/TRAINING PROGRAM

## 2017-01-07 PROCEDURE — 99233 SBSQ HOSP IP/OBS HIGH 50: CPT | Mod: GC | Performed by: INTERNAL MEDICINE

## 2017-01-07 PROCEDURE — 25000125 ZZHC RX 250: Performed by: STUDENT IN AN ORGANIZED HEALTH CARE EDUCATION/TRAINING PROGRAM

## 2017-01-07 PROCEDURE — 76705 ECHO EXAM OF ABDOMEN: CPT

## 2017-01-07 PROCEDURE — S0171 BUMETANIDE 0.5 MG: HCPCS | Performed by: INTERNAL MEDICINE

## 2017-01-07 PROCEDURE — 25000132 ZZH RX MED GY IP 250 OP 250 PS 637: Performed by: STUDENT IN AN ORGANIZED HEALTH CARE EDUCATION/TRAINING PROGRAM

## 2017-01-07 PROCEDURE — 85610 PROTHROMBIN TIME: CPT | Performed by: INTERNAL MEDICINE

## 2017-01-07 PROCEDURE — 83735 ASSAY OF MAGNESIUM: CPT | Performed by: INTERNAL MEDICINE

## 2017-01-07 PROCEDURE — 21400003 ZZH R&B CCU CRITICAL UMMC

## 2017-01-07 PROCEDURE — 80048 BASIC METABOLIC PNL TOTAL CA: CPT | Performed by: INTERNAL MEDICINE

## 2017-01-07 RX ORDER — FUROSEMIDE 10 MG/ML
40 INJECTION INTRAMUSCULAR; INTRAVENOUS ONCE
Status: COMPLETED | OUTPATIENT
Start: 2017-01-07 | End: 2017-01-07

## 2017-01-07 RX ORDER — BUMETANIDE 0.25 MG/ML
2 INJECTION INTRAMUSCULAR; INTRAVENOUS ONCE
Status: COMPLETED | OUTPATIENT
Start: 2017-01-07 | End: 2017-01-07

## 2017-01-07 RX ORDER — FUROSEMIDE 10 MG/ML
20 INJECTION INTRAMUSCULAR; INTRAVENOUS ONCE
Status: DISCONTINUED | OUTPATIENT
Start: 2017-01-07 | End: 2017-01-07

## 2017-01-07 RX ORDER — FUROSEMIDE 10 MG/ML
80 INJECTION INTRAMUSCULAR; INTRAVENOUS ONCE
Status: DISCONTINUED | OUTPATIENT
Start: 2017-01-07 | End: 2017-01-07

## 2017-01-07 RX ORDER — SODIUM POLYSTYRENE SULFONATE 15 G/60ML
15 SUSPENSION ORAL; RECTAL ONCE
Status: DISCONTINUED | OUTPATIENT
Start: 2017-01-07 | End: 2017-01-09

## 2017-01-07 RX ADMIN — HYDROXYZINE HYDROCHLORIDE 50 MG: 50 TABLET, FILM COATED ORAL at 20:43

## 2017-01-07 RX ADMIN — LIDOCAINE HYDROCHLORIDE 30 ML: 20 SOLUTION ORAL; TOPICAL at 23:17

## 2017-01-07 RX ADMIN — BUSPIRONE HYDROCHLORIDE 30 MG: 15 TABLET ORAL at 08:50

## 2017-01-07 RX ADMIN — ONDANSETRON 4 MG: 4 TABLET, ORALLY DISINTEGRATING ORAL at 04:38

## 2017-01-07 RX ADMIN — IRON 325 MG: 65 TABLET ORAL at 08:50

## 2017-01-07 RX ADMIN — HYDROXYZINE HYDROCHLORIDE 50 MG: 50 TABLET, FILM COATED ORAL at 16:18

## 2017-01-07 RX ADMIN — ONDANSETRON 4 MG: 2 INJECTION INTRAMUSCULAR; INTRAVENOUS at 09:47

## 2017-01-07 RX ADMIN — BUMETANIDE 2 MG: 0.25 INJECTION, SOLUTION INTRAMUSCULAR; INTRAVENOUS at 12:20

## 2017-01-07 RX ADMIN — BUSPIRONE HYDROCHLORIDE 30 MG: 15 TABLET ORAL at 20:42

## 2017-01-07 RX ADMIN — BUSPIRONE HYDROCHLORIDE 30 MG: 15 TABLET ORAL at 16:18

## 2017-01-07 RX ADMIN — ACETAMINOPHEN 650 MG: 325 TABLET, FILM COATED ORAL at 21:26

## 2017-01-07 RX ADMIN — BUMETANIDE 2 MG: 0.25 INJECTION, SOLUTION INTRAMUSCULAR; INTRAVENOUS at 20:48

## 2017-01-07 RX ADMIN — IRON 325 MG: 65 TABLET ORAL at 20:42

## 2017-01-07 RX ADMIN — DIGOXIN 125 MCG: 125 TABLET ORAL at 08:50

## 2017-01-07 RX ADMIN — FUROSEMIDE 40 MG: 10 INJECTION, SOLUTION INTRAVENOUS at 05:59

## 2017-01-07 RX ADMIN — LIDOCAINE HYDROCHLORIDE 30 ML: 20 SOLUTION ORAL; TOPICAL at 05:59

## 2017-01-07 RX ADMIN — HYDROXYZINE HYDROCHLORIDE 50 MG: 50 TABLET, FILM COATED ORAL at 08:50

## 2017-01-07 RX ADMIN — DULOXETINE 90 MG: 30 CAPSULE, DELAYED RELEASE ORAL at 08:50

## 2017-01-07 RX ADMIN — ACETAMINOPHEN 650 MG: 325 TABLET, FILM COATED ORAL at 06:03

## 2017-01-07 ASSESSMENT — PAIN DESCRIPTION - DESCRIPTORS
DESCRIPTORS: ACHING
DESCRIPTORS: ACHING;CONSTANT
DESCRIPTORS: ACHING

## 2017-01-07 NOTE — PLAN OF CARE
Problem: Goal Outcome Summary  Goal: Goal Outcome Summary  Outcome: No Change  Patient admitted from clinic. Continue home milrinone.  RUQ pain and nausea.  Received lasix in clinic; diuresing; see flowsheets. Transfer to . Report given.

## 2017-01-07 NOTE — PLAN OF CARE
Problem: Goal Outcome Summary  Goal: Goal Outcome Summary  1. Pt will be hemodynamically stable  2. Pt s pain will be managed within stated goal  Outcome: No Change  Pt VSS, SR with HR in the 80s, on RA with sats in the upper 90s. Endorses pain in upper quadrant of abdomen but states that she is feeling much better since being able to void. Up independently to bathroom. Dobutamine gtt continues at 7.5mcg/kg/min. Pt ambulated with mom in hallways. Continue to monitor and notify team with changes.

## 2017-01-07 NOTE — PLAN OF CARE
"Problem: Goal Outcome Summary  Goal: Goal Outcome Summary  1. Pt will be hemodynamically stable  2. Pt s pain will be managed within stated goal  Outcome: No Change  Pt feeling unwell majority of shift. Diaphoretic, pale, feeling SOB (sats 100%) nauseous, upper quadrant abdominal pain. BP's lower and ST on tele. Dr. Jeremy Barron to bedside to assess. GI cocktail ordered and given with pt able to have minor relief. MT notified this writer that pt had 10 beat run of VT or aberrant AFib while asleep. MD notified again and a.m labs drawn early. Pt unable to urinate, despite urge and attempts. Bladder scanned at 2200 for 30mL and at 0500 for 70 mL. MD once again notified of this, pt's newest labs and pt's request for another GI cocktail. PRN zofran given x2 q6h and prn tylenol x1. Heating pad utilized with little relief. One time dose IV lasix and additional GI cocktail given. Urine sample for tox screen still needs to be collected, pt aware. Pt expressed anxiety and concern with current symptoms, states, \"I just want to feel better\". Dobutamine gtt continues at 5 mcg/kg/min. Continue to monitor, follow poc, alert C2 MD's with changes and/or concerns.            "

## 2017-01-07 NOTE — PLAN OF CARE
Problem: Goal Outcome Summary  Goal: Goal Outcome Summary  1. Pt will be hemodynamically stable  2. Pt s pain will be managed within stated goal  Pt AOx4 VSS. Pt in pain most of day in upper quadrant of abdo. Pt Ultrasound of abdo done. Dobutamine increase from 5 to 7.5 mcg/kg/min equaling 8.1 ml per hour. 2 mg bumex given with good results. Pt feeling better as results of changes. Still has some nausea, zofran given x 1. Will continue with care plan, continue to monitor and notify MD's of any changes.

## 2017-01-07 NOTE — PROGRESS NOTES
Cardiology Progress Note    Anne Eugene MRN# [id number]   Age: 26 year old YOB: 1990          Assessment and Plan:   Assessment/Plan:  Anne Eugene is a 26 year old female with PMHx significant for NICM secondary to polysubstance abuse (methamphetamine, marijuana, tobacco; initially diagnosed 7/2016 upon transfer from OSH in severe cardiogenic shock, bilateral PEs, and shock liver), s/p ICD (10/12/16), 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    Updates  - This AM, patient feeling worse, Cr up to 1.7 and lactate to 3.2. 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 Patient's weight up 3 lbs, elevated JVP, nausea. This is consistent with her prior heart failure admission. Reports compliance with home medications and cardiac diet. This will be patient's 8th admission for heart failure since being diagnosed in July 2016. Anne is unable to be considered for advanced heart failure therapies, including LVAD and heart transplant, until she is clean for 1 year. Most recent known substance use was marijuana in October 2016. s/p 160mg IV lasix in clinic on 1/7 (home dose is torsemide 80mg BID)  - Continue increased dobutamine to 7.5 mcg/kg/min  - Likely give additional diuretics this afternoon  - Daily weights, strict I&Os  - Previously on lisinopril in the past, but stopped when dobutamine was started  - Beta blocker deferred due to low cardiac output  - Held spironolactone 25mg qday, given hyperkalemia  - Continue digoxin 125mcg qday      # ROBERT on CKD  # Hyperkalemia  Cr up to 1.7 from 1.34 on admission. Baseline range between  "1.1-1.3. Very likely from poor renal perfusion from low output state. K up to 5.7, which should improve as patient is having more diuresis  - Dobutamine increase as above  - BMP BID while diuresing  - Held spironolactone and KCL supplements, repeat K this afternoon    # Bilateral PE    # Supratherapeutic INR  PE diagnosed at OSH 7/2016.  Anticoagulated with warfarin, goal INR 2-3. INR 3.32 on admission  - Warfarin  - Daily INR    # Acute on chronic normocytic anemia  Last iron studies suggest anemia of chronic disease, likely from cardiomyopathy. Hgb at baseline of ~ 11.0  - Repeat CBC  - Continue iron supplements    # Anxiety/Depression:     Given social circumstances surround her current medical status, was visibly upset multiple times during previous admission. Psychiatry saw her on 12/14 and recommended to increase cymbalta and for her to be referred back to her established psychiatrist at discharge with recommendations for follow up with psychotherapist.    - Continue buspar, cymbalta, atarax  - NO benzos    FEN: no IVF, monitor and replete lytes, NPO for now  Prophylaxis: on warfarin  Code Status: full  Disposition: cards 2 for further evaluation and diuresis    Patient seen and discussed with Dr. Storey, who agrees with above plan.    Tito Bender MD  PGY2  Pager: [phone]        Interval History:   Overnight no urination since midnight. Additional 40mg IV lasix given this AM with no response. Reports on-going intermittent chills at night. Continued nausea and upper abdominal pain.     Review of symptoms otherwise negative.          Medications:   Reviewed. Please see EPIC for details.           Physical Exam:   I&O's: I/O last 3 completed shifts:  In: 506 [P.O.:425; I.V.:81]  Out: 950 [Urine:950]    Vitals were reviewed  B/P: Blood pressure 92/57, temperature 97.5  F (36.4  C), temperature source Oral, resp. rate 20, height 1.6 m (5' 3\"), weight 72.077 kg (158 lb 14.4 oz), SpO2 97 %.    General: looks " uncomfortable due to abdominal pain/nausea, alert, following commands, no breathing issues  HEENT: MMM, PERRLA, EOM intact  CV: regular tachycardia, + S1S2; JVD to angle of jaw  Resp: Clear to auscultation bilaterally, no wheezes, rhonchi  Abd: Soft, mildly distended, TTP in upper quadrants, BS+  Skin: Not jaundiced, no rash, no ecchymoses  Extremities: slightly cool, well perfused, palpable pulses, no edema  Neuro: A&Ox3, No lateralizing symptoms or focal neurologic deficits           Data:   ROUTINE LABS (Last four results)  CMP  Recent Labs  Lab 01/07/17  0430 01/06/17  1727 01/06/17  1422 01/06/17  1011 01/02/17  0600  01/01/17  0600    134  --  135 138  < > 137   POTASSIUM 5.5* 4.4  --  4.5 3.4  < > 3.5   CHLORIDE 102 99  --  100 96  < > 98   CO2 24 24  --  22 32  < > 30   ANIONGAP 10 10  --  12 10  < > 9   * 101*  --  112* 93  < > 92   BUN 35* 28  --  29 30  < > 29   CR 1.70* 1.29*  --  1.34* 1.14*  < > 1.24*   GFRESTIMATED 37* 51*  --  48* 58*  < > 53*   GFRESTBLACK 45* 61  --  59* 71  < > 64   MARIO 8.9 9.0  --  9.3 9.1  < > 9.2   MAG 2.5* 2.0 2.1  --   --   --  2.2   PROTTOTAL  --  7.2 7.5  --   --   --   --    ALBUMIN  --  3.9 3.9  --   --   --   --    BILITOTAL  --  0.9 0.8  --   --   --   --    ALKPHOS  --  73 74  --   --   --   --    AST  --  191* 203*  --   --   --   --    ALT  --  202* 213*  --   --   --   --    < > = values in this interval not displayed.  CBC  Recent Labs  Lab 01/06/17  1422 01/01/17  0600   WBC 7.3 5.7   RBC 4.09 4.13   HGB 11.8 12.0   HCT 37.8 38.1   MCV 92 92   MCH 28.9 29.1   MCHC 31.2* 31.5   RDW 15.9* 15.9*    252     INR  Recent Labs  Lab 01/06/17  1011 01/02/17  0600 01/01/17  0600   INR 3.32* 2.19* 2.34*     Arterial Blood Gas  Recent Labs  Lab 01/06/17  1404   PH 7.46*   PCO2 34*   PO2 90   HCO3 24   O2PER 21

## 2017-01-07 NOTE — PLAN OF CARE
Problem: Goal Outcome Summary  Goal: Goal Outcome Summary  Transfer from ICU after being admitted today. Pt has fluid overload and diuresing. Pt on continuous dobutamine drip at 5 mcg/kg/min equaling 5.4 ml/hr. Drug tox screen needed. Will continue with care plan, continue to monitor and notify MD's of any changes.

## 2017-01-08 LAB
ANION GAP SERPL CALCULATED.3IONS-SCNC: 8 MMOL/L (ref 3–14)
ANION GAP SERPL CALCULATED.3IONS-SCNC: 8 MMOL/L (ref 3–14)
BUN SERPL-MCNC: 28 MG/DL (ref 7–30)
BUN SERPL-MCNC: 28 MG/DL (ref 7–30)
CALCIUM SERPL-MCNC: 8.7 MG/DL (ref 8.5–10.1)
CALCIUM SERPL-MCNC: 9.3 MG/DL (ref 8.5–10.1)
CHLORIDE SERPL-SCNC: 100 MMOL/L (ref 94–109)
CHLORIDE SERPL-SCNC: 97 MMOL/L (ref 94–109)
CO2 SERPL-SCNC: 26 MMOL/L (ref 20–32)
CO2 SERPL-SCNC: 28 MMOL/L (ref 20–32)
CREAT SERPL-MCNC: 1.55 MG/DL (ref 0.52–1.04)
CREAT SERPL-MCNC: 1.62 MG/DL (ref 0.52–1.04)
GFR SERPL CREATININE-BSD FRML MDRD: 39 ML/MIN/1.7M2
GFR SERPL CREATININE-BSD FRML MDRD: 41 ML/MIN/1.7M2
GLUCOSE SERPL-MCNC: 110 MG/DL (ref 70–99)
GLUCOSE SERPL-MCNC: 86 MG/DL (ref 70–99)
INR PPP: 3.93 (ref 0.86–1.14)
MAGNESIUM SERPL-MCNC: 2.2 MG/DL (ref 1.6–2.3)
MAGNESIUM SERPL-MCNC: 2.3 MG/DL (ref 1.6–2.3)
POTASSIUM SERPL-SCNC: 4.1 MMOL/L (ref 3.4–5.3)
POTASSIUM SERPL-SCNC: 4.3 MMOL/L (ref 3.4–5.3)
SODIUM SERPL-SCNC: 132 MMOL/L (ref 133–144)
SODIUM SERPL-SCNC: 135 MMOL/L (ref 133–144)

## 2017-01-08 PROCEDURE — S0171 BUMETANIDE 0.5 MG: HCPCS | Performed by: INTERNAL MEDICINE

## 2017-01-08 PROCEDURE — 25000125 ZZHC RX 250: Performed by: INTERNAL MEDICINE

## 2017-01-08 PROCEDURE — 99233 SBSQ HOSP IP/OBS HIGH 50: CPT | Mod: GC | Performed by: INTERNAL MEDICINE

## 2017-01-08 PROCEDURE — 25000132 ZZH RX MED GY IP 250 OP 250 PS 637: Performed by: INTERNAL MEDICINE

## 2017-01-08 PROCEDURE — 27210995 ZZH RX 272

## 2017-01-08 PROCEDURE — 83735 ASSAY OF MAGNESIUM: CPT | Performed by: INTERNAL MEDICINE

## 2017-01-08 PROCEDURE — 80048 BASIC METABOLIC PNL TOTAL CA: CPT | Performed by: INTERNAL MEDICINE

## 2017-01-08 PROCEDURE — 40000556 ZZH STATISTIC PERIPHERAL IV START W US GUIDANCE

## 2017-01-08 PROCEDURE — 21400003 ZZH R&B CCU CRITICAL UMMC

## 2017-01-08 PROCEDURE — 36415 COLL VENOUS BLD VENIPUNCTURE: CPT | Performed by: INTERNAL MEDICINE

## 2017-01-08 PROCEDURE — 85610 PROTHROMBIN TIME: CPT | Performed by: INTERNAL MEDICINE

## 2017-01-08 PROCEDURE — 40000257 ZZH STATISTIC CONSULT NO CHARGE VASC ACCESS

## 2017-01-08 RX ORDER — BUMETANIDE 0.25 MG/ML
3 INJECTION INTRAMUSCULAR; INTRAVENOUS 3 TIMES DAILY
Status: DISCONTINUED | OUTPATIENT
Start: 2017-01-08 | End: 2017-01-09

## 2017-01-08 RX ORDER — AMOXICILLIN 250 MG
2 CAPSULE ORAL 2 TIMES DAILY PRN
Status: DISCONTINUED | OUTPATIENT
Start: 2017-01-08 | End: 2017-01-20 | Stop reason: HOSPADM

## 2017-01-08 RX ORDER — SPIRONOLACTONE 25 MG
12.5 TABLET ORAL DAILY
Status: DISCONTINUED | OUTPATIENT
Start: 2017-01-08 | End: 2017-01-14

## 2017-01-08 RX ORDER — BUMETANIDE 0.25 MG/ML
2 INJECTION INTRAMUSCULAR; INTRAVENOUS EVERY 12 HOURS
Status: DISCONTINUED | OUTPATIENT
Start: 2017-01-08 | End: 2017-01-08

## 2017-01-08 RX ADMIN — IRON 325 MG: 65 TABLET ORAL at 19:48

## 2017-01-08 RX ADMIN — DULOXETINE 90 MG: 30 CAPSULE, DELAYED RELEASE ORAL at 09:31

## 2017-01-08 RX ADMIN — DIGOXIN 125 MCG: 125 TABLET ORAL at 09:32

## 2017-01-08 RX ADMIN — BUMETANIDE 3 MG: 0.25 INJECTION, SOLUTION INTRAMUSCULAR; INTRAVENOUS at 20:58

## 2017-01-08 RX ADMIN — ACETAMINOPHEN 650 MG: 325 TABLET, FILM COATED ORAL at 19:39

## 2017-01-08 RX ADMIN — LIDOCAINE HYDROCHLORIDE 30 ML: 20 SOLUTION ORAL; TOPICAL at 19:39

## 2017-01-08 RX ADMIN — HYDROXYZINE HYDROCHLORIDE 50 MG: 50 TABLET, FILM COATED ORAL at 19:48

## 2017-01-08 RX ADMIN — POLYETHYLENE GLYCOL 3350 17 G: 17 POWDER, FOR SOLUTION ORAL at 10:02

## 2017-01-08 RX ADMIN — HYDROXYZINE HYDROCHLORIDE 50 MG: 50 TABLET, FILM COATED ORAL at 09:32

## 2017-01-08 RX ADMIN — IRON 325 MG: 65 TABLET ORAL at 09:32

## 2017-01-08 RX ADMIN — DOBUTAMINE 7.5 MCG/KG/MIN: 12.5 INJECTION, SOLUTION, CONCENTRATE INTRAVENOUS at 19:48

## 2017-01-08 RX ADMIN — HYDROXYZINE HYDROCHLORIDE 50 MG: 50 TABLET, FILM COATED ORAL at 14:10

## 2017-01-08 RX ADMIN — Medication 12.5 MG: at 09:41

## 2017-01-08 RX ADMIN — BUSPIRONE HYDROCHLORIDE 30 MG: 15 TABLET ORAL at 19:48

## 2017-01-08 RX ADMIN — BUMETANIDE 2 MG: 0.25 INJECTION, SOLUTION INTRAMUSCULAR; INTRAVENOUS at 09:31

## 2017-01-08 RX ADMIN — LIDOCAINE HYDROCHLORIDE 30 ML: 20 SOLUTION ORAL; TOPICAL at 13:14

## 2017-01-08 RX ADMIN — BUSPIRONE HYDROCHLORIDE 30 MG: 15 TABLET ORAL at 14:10

## 2017-01-08 RX ADMIN — DOBUTAMINE 7.5 MCG/KG/MIN: 12.5 INJECTION, SOLUTION, CONCENTRATE INTRAVENOUS at 01:26

## 2017-01-08 RX ADMIN — SENNOSIDES AND DOCUSATE SODIUM 2 TABLET: 8.6; 5 TABLET ORAL at 16:46

## 2017-01-08 RX ADMIN — LIDOCAINE HYDROCHLORIDE 1 ML: 10 INJECTION, SOLUTION EPIDURAL; INFILTRATION; INTRACAUDAL; PERINEURAL at 23:27

## 2017-01-08 RX ADMIN — BUMETANIDE 3 MG: 0.25 INJECTION, SOLUTION INTRAMUSCULAR; INTRAVENOUS at 15:10

## 2017-01-08 RX ADMIN — ACETAMINOPHEN 650 MG: 325 TABLET, FILM COATED ORAL at 13:18

## 2017-01-08 RX ADMIN — BUSPIRONE HYDROCHLORIDE 30 MG: 15 TABLET ORAL at 09:32

## 2017-01-08 ASSESSMENT — PAIN DESCRIPTION - DESCRIPTORS: DESCRIPTORS: ACHING;CONSTANT

## 2017-01-08 NOTE — PLAN OF CARE
Problem: Goal Outcome Summary  Goal: Goal Outcome Summary  1. Pt will be hemodynamically stable  2. Pt s pain will be managed within stated goal  Outcome: No Change  Pt admitted fluid up and syncope. Pt again with abdominal pain at beginning of night shift. One time dose IV bumex given on eves and pt unable to urinate for a few hours afterwards. VSS, RA, SR/ST on tele, pain managed with prn tylenol, positioning and GI cocktail. Dobutamine gtt at 7.5 mcg/kg/min (8.1mL/hr). No c/o nausea, able to eat much of dinner. Pt able to rest between cares. Pt up independently. Plan for continued diuresis, monitor labs, follow poc, alert C2 MD's with changes and/or concerns.

## 2017-01-08 NOTE — PLAN OF CARE
Problem: Goal Outcome Summary  Goal: Goal Outcome Summary  1. Pt will be hemodynamically stable  2. Pt s pain will be managed within stated goal  Outcome: No Change  D: Admitted 1/6 for volume overload and syncope.  I/A: VSS on room air. Sinus rhythm/sinus tach. Abdominal pain manageable with repositioning and aromatherapy in AM, worsening pain in afternoon. Given prn tylenol and GI cocktail with some relief. Dobutamine gtt at 7.5 mcg/kg/min (8.1mL/hr). Scheduled IV bumex given with modest UOP. Increased bumex dosing per primary team. Resting between cares. Up ad ajay.   P: Continue to monitor and assess. Notify Cards 2 with concerns.

## 2017-01-08 NOTE — PROGRESS NOTES
Cardiology Progress Note    Anne Eugene MRN# [id number]   Age: 26 year old YOB: 1990          Assessment and Plan:   Assessment/Plan:  Anne Eugene is a 26 year old female with PMHx significant for NICM secondary to polysubstance abuse (methamphetamine, marijuana, tobacco; initially diagnosed 7/2016 upon transfer from OSH in severe cardiogenic shock, bilateral PEs, and shock liver), s/p ICD (10/12/16), 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 Patient's weight up 3 lbs, elevated JVP, nausea. This is consistent with her prior heart failure admission. Reports compliance with home medications and cardiac diet. This will be patient's 8th admission for heart failure since being diagnosed in July 2016. Anne is unable to be considered for advanced heart failure therapies, including LVAD and heart transplant, until she is clean for 1 year. Most recent known substance use was marijuana in October 2016. s/p 160mg IV lasix in clinic on 1/7 (home dose is torsemide 80mg BID)  - Continue dobutamine at 7.5 mcg/kg/min (increased on 1/7/17)  - Increase bumex to 3mg IV TID  - Patient and family wondering about second opinion to get LVAD sooner. Planning to talk with heart failure doctors at [facility], with tentative plans for outpatient consultation early next week  - Daily weights, strict I&Os  - Previously on lisinopril, but stopped when dobutamine was started  - Beta blocker deferred due to low cardiac output  - Adding back spironolactone at 12.5 mg daily (home dose 25mg qday)  - Continue digoxin 125mcg qday      # ROBERT on CKD  # Hyperkalemia, resolved  Cr  "down-trending with increased dobutamine and diuresis. Baseline range between 1.1-1.3. Very likely from poor renal perfusion from low output state.  - Continue dobutamine at 7.5 mcg/kg/min  - BMP BID while diuresing    # Bilateral PE    # Supratherapeutic INR  PE diagnosed at OSH 7/2016.  Anticoagulated with warfarin, goal INR 2-3. INR 3.32 on admission  - Warfarin per pharamacy  - Daily INR    # Acute on chronic normocytic anemia  Last iron studies suggest anemia of chronic disease, likely from cardiomyopathy. Hgb at baseline of ~ 11.0  - Continue iron supplements    # Anxiety/Depression:     Given social circumstances surround her current medical status, was visibly upset multiple times during previous admission. Psychiatry saw her on 12/14 and recommended to increase cymbalta and for her to be referred back to her established psychiatrist at discharge with recommendations for follow up with psychotherapist.    - Continue buspar, cymbalta, atarax  - NO benzos    FEN: no IVF, monitor and replete lytes, NPO for now  Prophylaxis: on warfarin  Code Status: full  Disposition: cards 2 for further evaluation and diuresis    Patient seen and discussed with Dr. Storey, who agrees with above plan.    Tito Bender MD  PGY2  Pager: [phone]        Interval History:   No events overnight. Denies dyspnea. No fevers. Reports on-going upper abdominal pain. Per nursing notes, was able to eat much of her dinner. Ultrasound with cholelithiasis, but no cholecystitis    Review of symptoms otherwise negative.          Medications:   Reviewed. Please see EPIC for details.           Physical Exam:   I&O's: I/O last 3 completed shifts:  In: 730.85 [P.O.:540; I.V.:190.85]  Out: 2510 [Urine:2400; Emesis/NG output:110]    Vitals were reviewed  B/P: Blood pressure 97/65, pulse 71, temperature 98.1  F (36.7  C), temperature source Oral, resp. rate 20, height 1.6 m (5' 3\"), weight 71.396 kg (157 lb 6.4 oz), SpO2 98 %.    General: looks " uncomfortable due to abdominal pain/nausea, alert, following commands, no breathing issues  HEENT: MMM, PERRLA, EOM intact  CV: regular tachycardia, + S1S2; JVD to angle of jaw  Resp: Clear to auscultation bilaterally, no wheezes, rhonchi  Abd: Soft, mildly distended, TTP in upper quadrants, BS+  Skin: Not jaundiced, no rash, no ecchymoses  Extremities: slightly cool, well perfused, palpable pulses, no edema  Neuro: A&Ox3, No lateralizing symptoms or focal neurologic deficits           Data:   ROUTINE LABS (Last four results)  CMP    Recent Labs  Lab 01/08/17  0720 01/07/17  1804 01/07/17  0900 01/07/17  0430 01/06/17  1727 01/06/17  1422    135  --  136 134  --    POTASSIUM 4.1 4.1 5.7* 5.5* 4.4  --    CHLORIDE 100 100  --  102 99  --    CO2 26 28  --  24 24  --    ANIONGAP 8 8  --  10 10  --    GLC 86 94  --  104* 101*  --    BUN 28 33*  --  35* 28  --    CR 1.55* 1.69*  --  1.70* 1.29*  --    GFRESTIMATED 41* 37*  --  37* 51*  --    GFRESTBLACK 50* 45*  --  45* 61  --    MARIO 9.3 8.4*  --  8.9 9.0  --    MAG 2.3 2.0  --  2.5* 2.0 2.1   PROTTOTAL  --   --   --   --  7.2 7.5   ALBUMIN  --   --   --   --  3.9 3.9   BILITOTAL  --   --   --   --  0.9 0.8   ALKPHOS  --   --   --   --  73 74   AST  --   --   --   --  191* 203*   ALT  --   --   --   --  202* 213*     CBC    Recent Labs  Lab 01/06/17  1422   WBC 7.3   RBC 4.09   HGB 11.8   HCT 37.8   MCV 92   MCH 28.9   MCHC 31.2*   RDW 15.9*        INR    Recent Labs  Lab 01/07/17  1804 01/06/17  1011 01/02/17  0600   INR 3.84* 3.32* 2.19*     Arterial Blood Gas    Recent Labs  Lab 01/06/17  1404   PH 7.46*   PCO2 34*   PO2 90   HCO3 24   O2PER 21

## 2017-01-09 ENCOUNTER — APPOINTMENT (OUTPATIENT)
Dept: CT IMAGING | Facility: CLINIC | Age: 27
DRG: 286 | End: 2017-01-09
Attending: STUDENT IN AN ORGANIZED HEALTH CARE EDUCATION/TRAINING PROGRAM
Payer: COMMERCIAL

## 2017-01-09 LAB
ACETAMINOPHEN QUAL: POSITIVE
ALBUMIN SERPL-MCNC: 3.4 G/DL (ref 3.4–5)
ALP SERPL-CCNC: 65 U/L (ref 40–150)
ALT SERPL W P-5'-P-CCNC: 487 U/L (ref 0–50)
AMANTADINE: NEGATIVE
AMITRIPTYLINE QUAL: NEGATIVE
AMOXAPINE: NEGATIVE
AMPHETAMINES QUAL: NEGATIVE
AMYLASE SERPL-CCNC: 38 U/L (ref 30–110)
ANION GAP SERPL CALCULATED.3IONS-SCNC: 10 MMOL/L (ref 3–14)
ANION GAP SERPL CALCULATED.3IONS-SCNC: 11 MMOL/L (ref 3–14)
AST SERPL W P-5'-P-CCNC: 265 U/L (ref 0–45)
ATROPINE: NEGATIVE
BENZODIAZ UR QL: ABNORMAL
BILIRUB DIRECT SERPL-MCNC: 0.3 MG/DL (ref 0–0.2)
BILIRUB SERPL-MCNC: 0.8 MG/DL (ref 0.2–1.3)
BUN SERPL-MCNC: 27 MG/DL (ref 7–30)
BUN SERPL-MCNC: 29 MG/DL (ref 7–30)
CAFFEINE QUAL: POSITIVE
CALCIUM SERPL-MCNC: 8.3 MG/DL (ref 8.5–10.1)
CALCIUM SERPL-MCNC: 8.9 MG/DL (ref 8.5–10.1)
CANNABINOIDS UR QL SCN: ABNORMAL
CARBAMAZEPINE QUAL: NEGATIVE
CHLORIDE SERPL-SCNC: 96 MMOL/L (ref 94–109)
CHLORIDE SERPL-SCNC: 99 MMOL/L (ref 94–109)
CHLORPHENIRAMINE: NEGATIVE
CHLORPROMAZINE: NEGATIVE
CITALOPRAM QUAL: NEGATIVE
CLOMIPRAMINE QUAL: NEGATIVE
CO2 SERPL-SCNC: 25 MMOL/L (ref 20–32)
CO2 SERPL-SCNC: 28 MMOL/L (ref 20–32)
COCAINE QUAL: NEGATIVE
COCAINE UR QL: ABNORMAL
CODEINE QUAL: NEGATIVE
CREAT SERPL-MCNC: 1.51 MG/DL (ref 0.52–1.04)
CREAT SERPL-MCNC: 1.68 MG/DL (ref 0.52–1.04)
DESIPRAMINE QUAL: NEGATIVE
DEXTROMETHORPHAN: NEGATIVE
DIGOXIN SERPL-MCNC: 1.2 UG/L (ref 0.5–2)
DIPHENHYDRAMINE: NEGATIVE
DOXEPIN/METABOLITE: NEGATIVE
DOXYLAMINE: ABNORMAL
EPHEDRINE OR PSEUDO: NEGATIVE
FENTANYL QUAL: NEGATIVE
FLUOXETINE AND METAB: NEGATIVE
GFR SERPL CREATININE-BSD FRML MDRD: 37 ML/MIN/1.7M2
GFR SERPL CREATININE-BSD FRML MDRD: 42 ML/MIN/1.7M2
GLUCOSE SERPL-MCNC: 101 MG/DL (ref 70–99)
GLUCOSE SERPL-MCNC: 80 MG/DL (ref 70–99)
HYDROCODONE QUAL: NEGATIVE
HYDROMORPHONE QUAL: NEGATIVE
IBUPROFEN QUAL: NEGATIVE
IMIPRAMINE QUAL: NEGATIVE
LACTATE BLD-SCNC: 0.9 MMOL/L (ref 0.7–2.1)
LAMOTRIGINE QUAL: NEGATIVE
LIPASE SERPL-CCNC: 70 U/L (ref 73–393)
LOXAPINE: NEGATIVE
MAGNESIUM SERPL-MCNC: 2 MG/DL (ref 1.6–2.3)
MAGNESIUM SERPL-MCNC: 2.6 MG/DL (ref 1.6–2.3)
MAPROTYLINE: NEGATIVE
MDMA QUAL: NEGATIVE
MEPERIDINE QUAL: NEGATIVE
METHAMPHETAMINE: NEGATIVE
METHODONE QUAL: NEGATIVE
MORPHINE QUAL: NEGATIVE
NICOTINE: NEGATIVE
NORTRIPTYLINE QUAL: NEGATIVE
OLANZAPINE QUAL: ABNORMAL
OPIATES UR QL SCN: ABNORMAL
OXYCODONE QUAL: NEGATIVE
PENTAZOCINE: NEGATIVE
PHENCYCLIDINE QUAL: NEGATIVE
PHENMETRAZINE: NEGATIVE
PHENTERMINE: NEGATIVE
PHENYLBUTAZONE: NEGATIVE
PHENYLPROPANOLAMINE: NEGATIVE
POTASSIUM SERPL-SCNC: 4 MMOL/L (ref 3.4–5.3)
POTASSIUM SERPL-SCNC: 4.2 MMOL/L (ref 3.4–5.3)
PROPOXPHENE QUAL: NEGATIVE
PROPRANOLOL QUAL: NEGATIVE
PROT SERPL-MCNC: 6.5 G/DL (ref 6.8–8.8)
PYRILAMINE: NEGATIVE
SALICYLATE QUAL: NEGATIVE
SODIUM SERPL-SCNC: 131 MMOL/L (ref 133–144)
SODIUM SERPL-SCNC: 138 MMOL/L (ref 133–144)
THEOBROMINE: POSITIVE
TOPIRAMATE QUAL: NEGATIVE
TRIMIPRAMINE QUAL: NEGATIVE
VENLAFAXINE QUAL: ABNORMAL

## 2017-01-09 PROCEDURE — 25000125 ZZHC RX 250: Performed by: INTERNAL MEDICINE

## 2017-01-09 PROCEDURE — 80048 BASIC METABOLIC PNL TOTAL CA: CPT | Performed by: INTERNAL MEDICINE

## 2017-01-09 PROCEDURE — 40000558 ZZH STATISTIC CVC DRESSING CHANGE

## 2017-01-09 PROCEDURE — S0171 BUMETANIDE 0.5 MG: HCPCS | Performed by: INTERNAL MEDICINE

## 2017-01-09 PROCEDURE — 36415 COLL VENOUS BLD VENIPUNCTURE: CPT | Performed by: STUDENT IN AN ORGANIZED HEALTH CARE EDUCATION/TRAINING PROGRAM

## 2017-01-09 PROCEDURE — 82150 ASSAY OF AMYLASE: CPT | Performed by: STUDENT IN AN ORGANIZED HEALTH CARE EDUCATION/TRAINING PROGRAM

## 2017-01-09 PROCEDURE — 99233 SBSQ HOSP IP/OBS HIGH 50: CPT | Mod: GC | Performed by: INTERNAL MEDICINE

## 2017-01-09 PROCEDURE — 36415 COLL VENOUS BLD VENIPUNCTURE: CPT | Performed by: INTERNAL MEDICINE

## 2017-01-09 PROCEDURE — 80076 HEPATIC FUNCTION PANEL: CPT | Performed by: INTERNAL MEDICINE

## 2017-01-09 PROCEDURE — 21400003 ZZH R&B CCU CRITICAL UMMC

## 2017-01-09 PROCEDURE — 25000132 ZZH RX MED GY IP 250 OP 250 PS 637: Performed by: INTERNAL MEDICINE

## 2017-01-09 PROCEDURE — 83735 ASSAY OF MAGNESIUM: CPT | Performed by: INTERNAL MEDICINE

## 2017-01-09 PROCEDURE — 83605 ASSAY OF LACTIC ACID: CPT | Performed by: INTERNAL MEDICINE

## 2017-01-09 PROCEDURE — 74176 CT ABD & PELVIS W/O CONTRAST: CPT

## 2017-01-09 PROCEDURE — 83690 ASSAY OF LIPASE: CPT | Performed by: STUDENT IN AN ORGANIZED HEALTH CARE EDUCATION/TRAINING PROGRAM

## 2017-01-09 PROCEDURE — 80162 ASSAY OF DIGOXIN TOTAL: CPT | Performed by: INTERNAL MEDICINE

## 2017-01-09 PROCEDURE — 25900017 H RX MED GY IP 259 OP 259 PS 637: Performed by: INTERNAL MEDICINE

## 2017-01-09 PROCEDURE — 25000125 ZZHC RX 250: Performed by: STUDENT IN AN ORGANIZED HEALTH CARE EDUCATION/TRAINING PROGRAM

## 2017-01-09 RX ORDER — BUMETANIDE 1 MG/1
3 TABLET ORAL 3 TIMES DAILY
Status: DISCONTINUED | OUTPATIENT
Start: 2017-01-09 | End: 2017-01-09

## 2017-01-09 RX ORDER — HYDROMORPHONE HYDROCHLORIDE 1 MG/ML
.3-.5 INJECTION, SOLUTION INTRAMUSCULAR; INTRAVENOUS; SUBCUTANEOUS EVERY 4 HOURS PRN
Status: DISCONTINUED | OUTPATIENT
Start: 2017-01-09 | End: 2017-01-11

## 2017-01-09 RX ORDER — PANTOPRAZOLE SODIUM 20 MG/1
20 TABLET, DELAYED RELEASE ORAL EVERY MORNING
Status: DISCONTINUED | OUTPATIENT
Start: 2017-01-09 | End: 2017-01-12

## 2017-01-09 RX ORDER — BUMETANIDE 1 MG/1
3 TABLET ORAL 3 TIMES DAILY
Status: DISCONTINUED | OUTPATIENT
Start: 2017-01-09 | End: 2017-01-10

## 2017-01-09 RX ORDER — HYDROMORPHONE HYDROCHLORIDE 1 MG/ML
0.4 INJECTION, SOLUTION INTRAMUSCULAR; INTRAVENOUS; SUBCUTANEOUS ONCE
Status: COMPLETED | OUTPATIENT
Start: 2017-01-09 | End: 2017-01-09

## 2017-01-09 RX ORDER — NALOXONE HYDROCHLORIDE 0.4 MG/ML
.1-.4 INJECTION, SOLUTION INTRAMUSCULAR; INTRAVENOUS; SUBCUTANEOUS
Status: DISCONTINUED | OUTPATIENT
Start: 2017-01-09 | End: 2017-01-20 | Stop reason: HOSPADM

## 2017-01-09 RX ADMIN — HYDROXYZINE HYDROCHLORIDE 50 MG: 50 TABLET, FILM COATED ORAL at 13:51

## 2017-01-09 RX ADMIN — LIDOCAINE HYDROCHLORIDE 30 ML: 20 SOLUTION ORAL; TOPICAL at 19:14

## 2017-01-09 RX ADMIN — HYDROXYZINE HYDROCHLORIDE 50 MG: 50 TABLET, FILM COATED ORAL at 08:49

## 2017-01-09 RX ADMIN — BUSPIRONE HYDROCHLORIDE 30 MG: 15 TABLET ORAL at 08:49

## 2017-01-09 RX ADMIN — BUSPIRONE HYDROCHLORIDE 30 MG: 15 TABLET ORAL at 20:11

## 2017-01-09 RX ADMIN — BUMETANIDE 3 MG: 0.25 INJECTION, SOLUTION INTRAMUSCULAR; INTRAVENOUS at 08:49

## 2017-01-09 RX ADMIN — Medication 2 G: at 08:52

## 2017-01-09 RX ADMIN — ONDANSETRON 4 MG: 2 INJECTION INTRAMUSCULAR; INTRAVENOUS at 14:37

## 2017-01-09 RX ADMIN — ACETAMINOPHEN 650 MG: 325 TABLET, FILM COATED ORAL at 19:14

## 2017-01-09 RX ADMIN — POLYETHYLENE GLYCOL 3350 17 G: 17 POWDER, FOR SOLUTION ORAL at 08:48

## 2017-01-09 RX ADMIN — BUMETANIDE 3 MG: 1 TABLET ORAL at 18:16

## 2017-01-09 RX ADMIN — PANTOPRAZOLE SODIUM 20 MG: 20 TABLET, DELAYED RELEASE ORAL at 09:56

## 2017-01-09 RX ADMIN — BUSPIRONE HYDROCHLORIDE 30 MG: 15 TABLET ORAL at 13:50

## 2017-01-09 RX ADMIN — DOBUTAMINE 7.5 MCG/KG/MIN: 12.5 INJECTION, SOLUTION, CONCENTRATE INTRAVENOUS at 20:11

## 2017-01-09 RX ADMIN — DULOXETINE 90 MG: 30 CAPSULE, DELAYED RELEASE ORAL at 08:49

## 2017-01-09 RX ADMIN — IRON 325 MG: 65 TABLET ORAL at 22:44

## 2017-01-09 RX ADMIN — DIGOXIN 125 MCG: 125 TABLET ORAL at 08:49

## 2017-01-09 RX ADMIN — HYDROXYZINE HYDROCHLORIDE 50 MG: 50 TABLET, FILM COATED ORAL at 20:11

## 2017-01-09 RX ADMIN — HYDROMORPHONE HYDROCHLORIDE 0.4 MG: 10 INJECTION, SOLUTION INTRAMUSCULAR; INTRAVENOUS; SUBCUTANEOUS at 21:54

## 2017-01-09 RX ADMIN — POTASSIUM CHLORIDE 20 MEQ: 1500 TABLET, EXTENDED RELEASE ORAL at 08:49

## 2017-01-09 RX ADMIN — BUMETANIDE 3 MG: 1 TABLET ORAL at 13:50

## 2017-01-09 RX ADMIN — Medication 12.5 MG: at 08:49

## 2017-01-09 RX ADMIN — IRON 325 MG: 65 TABLET ORAL at 08:49

## 2017-01-09 ASSESSMENT — PAIN DESCRIPTION - DESCRIPTORS
DESCRIPTORS: ACHING;CONSTANT

## 2017-01-09 NOTE — PLAN OF CARE
Problem: Goal Outcome Summary  Goal: Goal Outcome Summary  1. Pt will be hemodynamically stable  2. Pt s pain will be managed within stated goal  Outcome: No Change  Pt admitted fluid up and syncope. Pt again with abdominal pain at beginning of shift.  IV bumex given after pt's pain was better managed. PRN tylenol, GI cocktail and heat utilized with some success. VSS, RA, SR/ST on tele. Dobutamine gtt at 7.5 mcg/kg/min (8.1mL/hr). No c/o nausea, able to eat dinner. Pt eventually able to rest between cares. Pt up independently. Plan for continued diuresis, monitor labs, follow poc, alert C2 MD's with changes and/or concerns.

## 2017-01-09 NOTE — PLAN OF CARE
Problem: Goal Outcome Summary  Goal: Goal Outcome Summary  1. Pt will be hemodynamically stable  2. Pt s pain will be managed within stated goal  Outcome: No Change  Took care of pt from 3-7pm. VSS. SR. RA. Denies pain or sob. Dobutamine drip infusing at 7.5 mcg/kg/min via right single lumen picc. Left piv SL'd. Ate good supper. Gave prn senna per pt requests.

## 2017-01-09 NOTE — PROGRESS NOTES
"[facility]  Cardiology II Service  Daily Note  January 9, 2017      Interval History:   Nursing notes reviewed. She denies any chest pain, palpitations, worsening shortness of breath. NO LE edema. She feels that her breathing is at baseline now, she was laying flat when examined and appeared comfortable. Has mild epigastric pain, but denies any nausea, vomiting, changes in bowel habit or blood in stools.       Pertinent Medications:  Current Facility-Administered Medications   Medication     bumetanide (BUMEX) tablet 3 mg     warfarin-No DOSE today     spironolactone (ALDACTONE) half-tab 12.5 mg     DOBUTamine (DOBUTREX) 1,000 mg in D5W 250 mL infusion (adult max conc)     digoxin (LANOXIN) tablet 125 mcg       Examination:  BP 98/67 mmHg  Pulse 82  Temp(Src) 97  F (36.1  C) (Oral)  Resp 16  Ht 1.6 m (5' 3\")  Wt 71.895 kg (158 lb 8 oz)  BMI 28.08 kg/m2  SpO2 99%  GEN: A & O,  resting comfortably in bed, NAD, cooperative and conversational   HEENT: no scleral icterus, mucus membranes moist  NECK: Supple,  JVP at ear lobe at 30 degree angle, negative HJR  RESP: CTA bilaterally, no wheezing or crackles, no increased work of breathing   CV: Regular, normal rate, normal S1 and S2 without m/r/g   ABD: Soft, mildly tender to palpation in epigastrium , +BS, no guarding  EXT: No peripheral edema, warm/well perfused   NEURO: no gross focal deficits  Data:  CMP  Recent Labs  Lab 01/09/17  0739 01/08/17  1831 01/08/17  0720 01/07/17  1804  01/06/17  1727 01/06/17  1422    132* 135 135  < > 134  --    POTASSIUM 4.0 4.3 4.1 4.1  < > 4.4  --    CHLORIDE 99 97 100 100  < > 99  --    CO2 28 28 26 28  < > 24  --    ANIONGAP 10 8 8 8  < > 10  --    GLC 80 110* 86 94  < > 101*  --    BUN 27 28 28 33*  < > 28  --    CR 1.51* 1.62* 1.55* 1.69*  < > 1.29*  --    GFRESTIMATED 42* 39* 41* 37*  < > 51*  --    GFRESTBLACK 51* 47* 50* 45*  < > 61  --    MARIO 8.3* 8.7 9.3 8.4*  < > 9.0  --  "   MAG 2.0 2.2 2.3 2.0  < > 2.0 2.1   PROTTOTAL 6.5*  --   --   --   --  7.2 7.5   ALBUMIN 3.4  --   --   --   --  3.9 3.9   BILITOTAL 0.8  --   --   --   --  0.9 0.8   ALKPHOS 65  --   --   --   --  73 74   *  --   --   --   --  191* 203*   *  --   --   --   --  202* 213*   < > = values in this interval not displayed.  CBC  Recent Labs  Lab 01/06/17  1422   WBC 7.3   RBC 4.09   HGB 11.8   HCT 37.8   MCV 92   MCH 28.9   MCHC 31.2*   RDW 15.9*        INR  Recent Labs  Lab 01/08/17  1248 01/07/17  1804 01/06/17  1011   INR 3.93* 3.84* 3.32*     Arterial Blood Gas  Recent Labs  Lab 01/06/17  1404   PH 7.46*   PCO2 34*   PO2 90   HCO3 24   O2PER 21         Assessment and Plan  26 year old female with PMHx significant for NICM secondary to polysubstance abuse (methamphetamine, marijuana, tobacco; initially diagnosed 7/2016 upon transfer from OSH in severe cardiogenic shock, bilateral PEs, and shock liver), s/p ICD (10/12/16), 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 Presented with HF exacerbation, diuresed. Now on dobutamine 7.5mcg/kg/min and bumex 3mg tid.   8th admission for heart failure since being diagnosed in July 2016. Anne is unable to be considered for advanced heart failure therapies, including LVAD and heart transplant, until she is sober for 1 year. Most recent known substance use was marijuana in October 2016. s/p 160mg IV lasix in clinic on 1/7 (home dose is torsemide 80mg BID)  - Continue dobutamine at 7.5 mcg/kg/min (increased on 1/7/17)  - oral bumex 3mg tid   - Patient and family wondering about second opinion  to get LVAD sooner. Planning to talk with heart failure doctors at [facility], with tentative plans for outpatient consultation early next week  - Daily weights, strict I&Os  - Previously on lisinopril, but stopped when dobutamine was started  - Beta blocker deferred due to low cardiac output  - continue spironolactone at 12.5 mg daily (home dose 25mg qday)  - Continue digoxin 125mcg qday      # ROBERT on CKD/ improving: Cr 1.5 today, baseline 1-1.3   Very likely from poor renal perfusion from low output state. Improving w/ dobutamine  - f/u Cr and lytes  - avoid nephrotoxins    # Hyperkalemia, resolved    # Bilateral PE    # Supratherapeutic INR  PE diagnosed at OSH 7/2016.  Anticoagulated with warfarin, goal INR 2-3. INR 3.32 on admission  - Warfarin per pharmacy  - Daily INR    # Acute on chronic normocytic anemia  Last iron studies suggest anemia of chronic disease, likely from cardiomyopathy. Hgb at baseline of ~ 11.0  - Continue iron supplements    # Anxiety/Depression:     Given social circumstances surround her current medical status, was visibly upset multiple times during previous admission. Psychiatry saw her on 12/14 and recommended to increase cymbalta and for her to be referred back to her established psychiatrist at discharge with recommendations for follow up with psychotherapist.    - Continue buspar, cymbalta, atarax  - NO benzos    FEN: low salt diet  Prophylaxis: on warfarin  Code Status: full  Disposition: likely discharge tomorrow    Plan of care discussed with Dr. Aroldo Mixon MD  PGY3 Internal Medicine  [phone]      I personally saw and examined this patient, reviewed imaging and laboratory studies, confirmed physical examination and discussed results and plan with patient and or family.

## 2017-01-10 ENCOUNTER — APPOINTMENT (OUTPATIENT)
Dept: NUCLEAR MEDICINE | Facility: CLINIC | Age: 27
DRG: 286 | End: 2017-01-10
Attending: INTERNAL MEDICINE
Payer: COMMERCIAL

## 2017-01-10 ENCOUNTER — APPOINTMENT (OUTPATIENT)
Dept: ULTRASOUND IMAGING | Facility: CLINIC | Age: 27
DRG: 286 | End: 2017-01-10
Attending: STUDENT IN AN ORGANIZED HEALTH CARE EDUCATION/TRAINING PROGRAM
Payer: COMMERCIAL

## 2017-01-10 LAB
ALBUMIN SERPL-MCNC: 3.7 G/DL (ref 3.4–5)
ALBUMIN SERPL-MCNC: 3.8 G/DL (ref 3.4–5)
ALP SERPL-CCNC: 79 U/L (ref 40–150)
ALP SERPL-CCNC: 82 U/L (ref 40–150)
ALT SERPL W P-5'-P-CCNC: 602 U/L (ref 0–50)
ALT SERPL W P-5'-P-CCNC: 614 U/L (ref 0–50)
AMYLASE SERPL-CCNC: 27 U/L (ref 30–110)
ANION GAP SERPL CALCULATED.3IONS-SCNC: 13 MMOL/L (ref 3–14)
ANION GAP SERPL CALCULATED.3IONS-SCNC: 9 MMOL/L (ref 3–14)
AST SERPL W P-5'-P-CCNC: 292 U/L (ref 0–45)
AST SERPL W P-5'-P-CCNC: 318 U/L (ref 0–45)
BILIRUB DIRECT SERPL-MCNC: 0.3 MG/DL (ref 0–0.2)
BILIRUB SERPL-MCNC: 0.8 MG/DL (ref 0.2–1.3)
BILIRUB SERPL-MCNC: 0.9 MG/DL (ref 0.2–1.3)
BUN SERPL-MCNC: 30 MG/DL (ref 7–30)
BUN SERPL-MCNC: 34 MG/DL (ref 7–30)
CALCIUM SERPL-MCNC: 8.5 MG/DL (ref 8.5–10.1)
CALCIUM SERPL-MCNC: 9.2 MG/DL (ref 8.5–10.1)
CHLORIDE SERPL-SCNC: 96 MMOL/L (ref 94–109)
CHLORIDE SERPL-SCNC: 96 MMOL/L (ref 94–109)
CO2 SERPL-SCNC: 24 MMOL/L (ref 20–32)
CO2 SERPL-SCNC: 27 MMOL/L (ref 20–32)
CREAT SERPL-MCNC: 1.59 MG/DL (ref 0.52–1.04)
CREAT SERPL-MCNC: 1.64 MG/DL (ref 0.52–1.04)
ERYTHROCYTE [DISTWIDTH] IN BLOOD BY AUTOMATED COUNT: 16.1 % (ref 10–15)
GFR SERPL CREATININE-BSD FRML MDRD: 38 ML/MIN/1.7M2
GFR SERPL CREATININE-BSD FRML MDRD: 40 ML/MIN/1.7M2
GLUCOSE SERPL-MCNC: 102 MG/DL (ref 70–99)
GLUCOSE SERPL-MCNC: 92 MG/DL (ref 70–99)
HCT VFR BLD AUTO: 38.7 % (ref 35–47)
HGB BLD-MCNC: 12 G/DL (ref 11.7–15.7)
INR PPP: 3.37 (ref 0.86–1.14)
LACTATE BLD-SCNC: 1.6 MMOL/L (ref 0.7–2.1)
LIPASE SERPL-CCNC: 55 U/L (ref 73–393)
MAGNESIUM SERPL-MCNC: 2.6 MG/DL (ref 1.6–2.3)
MCH RBC QN AUTO: 29.1 PG (ref 26.5–33)
MCHC RBC AUTO-ENTMCNC: 31 G/DL (ref 31.5–36.5)
MCV RBC AUTO: 94 FL (ref 78–100)
PLATELET # BLD AUTO: 204 10E9/L (ref 150–450)
POTASSIUM SERPL-SCNC: 4 MMOL/L (ref 3.4–5.3)
POTASSIUM SERPL-SCNC: 4.4 MMOL/L (ref 3.4–5.3)
PROCALCITONIN SERPL-MCNC: 0.12 NG/ML
PROT SERPL-MCNC: 7.2 G/DL (ref 6.8–8.8)
PROT SERPL-MCNC: 7.4 G/DL (ref 6.8–8.8)
RADIOLOGIST FLAGS: ABNORMAL
RBC # BLD AUTO: 4.12 10E12/L (ref 3.8–5.2)
SODIUM SERPL-SCNC: 132 MMOL/L (ref 133–144)
SODIUM SERPL-SCNC: 132 MMOL/L (ref 133–144)
WBC # BLD AUTO: 6.2 10E9/L (ref 4–11)

## 2017-01-10 PROCEDURE — 25900017 H RX MED GY IP 259 OP 259 PS 637: Performed by: INTERNAL MEDICINE

## 2017-01-10 PROCEDURE — 25000125 ZZHC RX 250: Performed by: INTERNAL MEDICINE

## 2017-01-10 PROCEDURE — 21400003 ZZH R&B CCU CRITICAL UMMC

## 2017-01-10 PROCEDURE — 76705 ECHO EXAM OF ABDOMEN: CPT

## 2017-01-10 PROCEDURE — A9537 TC99M MEBROFENIN: HCPCS | Performed by: INTERNAL MEDICINE

## 2017-01-10 PROCEDURE — 82150 ASSAY OF AMYLASE: CPT | Performed by: INTERNAL MEDICINE

## 2017-01-10 PROCEDURE — 83605 ASSAY OF LACTIC ACID: CPT | Performed by: INTERNAL MEDICINE

## 2017-01-10 PROCEDURE — 80048 BASIC METABOLIC PNL TOTAL CA: CPT | Performed by: INTERNAL MEDICINE

## 2017-01-10 PROCEDURE — 78227 HEPATOBIL SYST IMAGE W/DRUG: CPT

## 2017-01-10 PROCEDURE — 83735 ASSAY OF MAGNESIUM: CPT | Performed by: INTERNAL MEDICINE

## 2017-01-10 PROCEDURE — 83690 ASSAY OF LIPASE: CPT | Performed by: INTERNAL MEDICINE

## 2017-01-10 PROCEDURE — 87040 BLOOD CULTURE FOR BACTERIA: CPT | Performed by: INTERNAL MEDICINE

## 2017-01-10 PROCEDURE — 36415 COLL VENOUS BLD VENIPUNCTURE: CPT | Performed by: INTERNAL MEDICINE

## 2017-01-10 PROCEDURE — 85027 COMPLETE CBC AUTOMATED: CPT | Performed by: INTERNAL MEDICINE

## 2017-01-10 PROCEDURE — 34300033 ZZH RX 343: Performed by: INTERNAL MEDICINE

## 2017-01-10 PROCEDURE — 85610 PROTHROMBIN TIME: CPT | Performed by: INTERNAL MEDICINE

## 2017-01-10 PROCEDURE — 25000132 ZZH RX MED GY IP 250 OP 250 PS 637: Performed by: INTERNAL MEDICINE

## 2017-01-10 PROCEDURE — 84145 PROCALCITONIN (PCT): CPT | Performed by: INTERNAL MEDICINE

## 2017-01-10 PROCEDURE — 80053 COMPREHEN METABOLIC PANEL: CPT | Performed by: INTERNAL MEDICINE

## 2017-01-10 PROCEDURE — 99233 SBSQ HOSP IP/OBS HIGH 50: CPT | Mod: GC | Performed by: INTERNAL MEDICINE

## 2017-01-10 PROCEDURE — 80076 HEPATIC FUNCTION PANEL: CPT | Performed by: INTERNAL MEDICINE

## 2017-01-10 RX ORDER — ERTAPENEM 1 G/1
1 INJECTION, POWDER, LYOPHILIZED, FOR SOLUTION INTRAMUSCULAR; INTRAVENOUS EVERY 24 HOURS
Status: COMPLETED | OUTPATIENT
Start: 2017-01-10 | End: 2017-01-14

## 2017-01-10 RX ORDER — BUMETANIDE 1 MG/1
3 TABLET ORAL
Status: DISCONTINUED | OUTPATIENT
Start: 2017-01-11 | End: 2017-01-11

## 2017-01-10 RX ORDER — KIT FOR THE PREPARATION OF TECHNETIUM TC 99M MEBROFENIN 45 MG/10ML
4.8-7.2 INJECTION, POWDER, LYOPHILIZED, FOR SOLUTION INTRAVENOUS ONCE
Status: COMPLETED | OUTPATIENT
Start: 2017-01-10 | End: 2017-01-10

## 2017-01-10 RX ADMIN — IRON 325 MG: 65 TABLET ORAL at 08:38

## 2017-01-10 RX ADMIN — DIGOXIN 125 MCG: 125 TABLET ORAL at 08:37

## 2017-01-10 RX ADMIN — Medication 12.5 MG: at 08:37

## 2017-01-10 RX ADMIN — HYDROXYZINE HYDROCHLORIDE 50 MG: 50 TABLET, FILM COATED ORAL at 20:05

## 2017-01-10 RX ADMIN — ACETAMINOPHEN 650 MG: 325 TABLET, FILM COATED ORAL at 07:20

## 2017-01-10 RX ADMIN — IRON 325 MG: 65 TABLET ORAL at 20:05

## 2017-01-10 RX ADMIN — HYDROMORPHONE HYDROCHLORIDE 0.5 MG: 10 INJECTION, SOLUTION INTRAMUSCULAR; INTRAVENOUS; SUBCUTANEOUS at 15:08

## 2017-01-10 RX ADMIN — MEBROFENIN 5.5 MCI.: 45 INJECTION, POWDER, LYOPHILIZED, FOR SOLUTION INTRAVENOUS at 12:30

## 2017-01-10 RX ADMIN — HYDROMORPHONE HYDROCHLORIDE 0.5 MG: 10 INJECTION, SOLUTION INTRAMUSCULAR; INTRAVENOUS; SUBCUTANEOUS at 08:36

## 2017-01-10 RX ADMIN — BUSPIRONE HYDROCHLORIDE 30 MG: 15 TABLET ORAL at 15:56

## 2017-01-10 RX ADMIN — HYDROXYZINE HYDROCHLORIDE 50 MG: 50 TABLET, FILM COATED ORAL at 07:20

## 2017-01-10 RX ADMIN — POTASSIUM CHLORIDE 20 MEQ: 1500 TABLET, EXTENDED RELEASE ORAL at 18:24

## 2017-01-10 RX ADMIN — ACETAMINOPHEN 650 MG: 325 TABLET, FILM COATED ORAL at 15:57

## 2017-01-10 RX ADMIN — HYDROMORPHONE HYDROCHLORIDE 0.5 MG: 10 INJECTION, SOLUTION INTRAMUSCULAR; INTRAVENOUS; SUBCUTANEOUS at 20:05

## 2017-01-10 RX ADMIN — DOBUTAMINE 7.5 MCG/KG/MIN: 12.5 INJECTION, SOLUTION, CONCENTRATE INTRAVENOUS at 18:24

## 2017-01-10 RX ADMIN — BUSPIRONE HYDROCHLORIDE 30 MG: 15 TABLET ORAL at 20:05

## 2017-01-10 RX ADMIN — PANTOPRAZOLE SODIUM 20 MG: 20 TABLET, DELAYED RELEASE ORAL at 08:38

## 2017-01-10 RX ADMIN — BUSPIRONE HYDROCHLORIDE 30 MG: 15 TABLET ORAL at 08:37

## 2017-01-10 RX ADMIN — HYDROXYZINE HYDROCHLORIDE 50 MG: 50 TABLET, FILM COATED ORAL at 15:57

## 2017-01-10 RX ADMIN — HYDROMORPHONE HYDROCHLORIDE 0.5 MG: 10 INJECTION, SOLUTION INTRAMUSCULAR; INTRAVENOUS; SUBCUTANEOUS at 04:35

## 2017-01-10 RX ADMIN — ERTAPENEM SODIUM 1 G: 1 INJECTION, POWDER, LYOPHILIZED, FOR SOLUTION INTRAMUSCULAR; INTRAVENOUS at 12:23

## 2017-01-10 RX ADMIN — DULOXETINE 90 MG: 30 CAPSULE, DELAYED RELEASE ORAL at 08:37

## 2017-01-10 RX ADMIN — BUMETANIDE 3 MG: 1 TABLET ORAL at 12:24

## 2017-01-10 RX ADMIN — BUMETANIDE 3 MG: 1 TABLET ORAL at 08:37

## 2017-01-10 ASSESSMENT — PAIN DESCRIPTION - DESCRIPTORS
DESCRIPTORS: ACHING;CONSTANT
DESCRIPTORS: DISCOMFORT

## 2017-01-10 NOTE — CONSULTS
GENERAL SURGERY H&P/CONSULT  January 10, 2017    CO-MORBIDITIES:   No diagnosis found.    ASSESSMENT: Anne Eugene is a 26 year old female hx NICM (last EF 20-25%) 2/2 polysubstance abuse s/p ICD, bilateral PE on Coumadin, admitted for CHF exacerbation, having RUQ pain for past couple of weeks, worsened in the last week; US concerning for cholecystitis with wall thickening, stones, and sludge.    PLAN:  Recommend HIDA scan to definitively evaluate for cholecystitis  If HIDA + (no filling of GB indicating cholecystitis), recommend percutaneous cholecystostomy tube  If HIDA -, pain may be from venous congestion 2/2 ICM  NPO  Cares per primary team    Patient seen, findings and plan discussed with staff, Dr. Guardado.    Evelio Hickman PGY-2  General Surgery      HISTORY PRESENTING ILLNESS: Anne Eugene is a 26 year old female hx NICM (last EF 20-25%) 2/2 polysubstance abuse s/p ICD, bilateral PE on Coumadin, admitted for CHF exacerbation, having RUQ pain for past couple of weeks, worsened in the last week; US concerning for cholecystitis with wall thickening, stones, and sludge.  Patient has been having pain for past couple of weeks, worse with meals, seems to coincide at times to CHF exacerbations (when she feels fluid up).  In the last week the pain has gotten worse, more so in RUQ, +nausea with emesis x2 yesterday, none today.  Last BM this am.  +Flatus.  Denies fever/chills.    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     Social History Main Topics     Smoking status: Never Smoker      Smokeless tobacco: Not on file     Alcohol Use: No     Drug Use: Yes     Special: Marijuana      Comment: smoked 2-3 days ago     Sexual Activity: No     Other Topics Concern     Parent/Sibling W/ Cabg, Mi Or Angioplasty Before 65f 55m? No     adopted     Social History Narrative       No family history on file.    ALLERGIES:      Allergies   Allergen Reactions     Amoxicillin Hives     Per previous records     Ceclor [Cefaclor] Hives     Per previous records     Clindamycin      THROWING UP       MEDICATIONS:    No current facility-administered medications on file prior to encounter.  Current Outpatient Prescriptions on File Prior to Encounter:  furosemide (LASIX) 10 MG/ML injection Inject 16 mLs (160 mg) into the vein once   D5W 5 % SOLN 170 mL with DOBUTamine 250 MG/20ML SOLN 1,000 mg Inject 0.36 mg/min into the vein continuous   torsemide (DEMADEX) 20 MG tablet Take 4 tablets (80 mg) by mouth 2 times daily   Potassium Chloride ER 20 MEQ TBCR Take 3 tablets (60 mEq) by mouth 2 times daily   warfarin (COUMADIN) 2 MG tablet Take 6mg on 1/2, then 4mg daily until you get your INR checked on 1/4 or 1/5.   Cyanocobalamin 1000 MCG CAPS Take 1,000 Units by mouth daily   DULoxetine (CYMBALTA) 30 MG EC capsule 3 capsules (90 mg) by Oral or Feeding Tube route daily   ondansetron (ZOFRAN-ODT) 4 MG ODT tab Take 1 tablet (4 mg) by mouth every 6 hours as needed for nausea   sodium chloride, PF, 0.9% PF flush 3 mLs by Intracatheter route every hour as needed for line flush   thiamine 100 MG tablet Take 1 tablet (100 mg) by mouth  daily   busPIRone 30 MG TABS Take 30 mg by mouth 3 times daily   Blood Pressure Monitor KIT 1 each daily   spironolactone (ALDACTONE) 25 MG tablet Take 1 tablet (25 mg) by mouth daily   digoxin (LANOXIN) 125 MCG tablet Take 1 tablet (125 mcg) by mouth daily   hydrOXYzine (ATARAX) 50 MG tablet Take 1 tablet (50 mg) by mouth 3 times daily   polyethylene glycol (MIRALAX/GLYCOLAX) packet Take 1 packet by mouth daily    triamcinolone (KENALOG) 0.1 % cream Apply topically 2 times daily   melatonin 3 MG tablet Take 1 tablet (3 mg) by mouth nightly as needed for sleep (Patient taking differently: Take 3 mg by mouth At Bedtime )   acetaminophen (TYLENOL) 325 MG tablet Take 1 tablet (325 mg) by mouth every 4 hours as needed for mild pain or fever   ferrous sulfate (IRON) 325 (65 FE) MG tablet Take 1 tablet (325 mg) by mouth 2 times daily   multivitamin, therapeutic with minerals (THERA-VIT-M) TABS Take 1 tablet by mouth daily   ascorbic acid (VITAMIN C) 500 MG tablet Take 2 tablets (1,000 mg) by mouth daily   Cholecalciferol (VITAMIN D) 2000 UNITS tablet Take 2,000 Units by mouth daily       PHYSICAL EXAMINATION:  Temp:  [97.5  F (36.4  C)-97.7  F (36.5  C)] 97.6  F (36.4  C)  Heart Rate:  [] 87  Resp:  [18-20] 20  BP: ()/(63-77) 105/72 mmHg  SpO2:  [96 %-98 %] 98 %  NAD, AAOx3  HR , on dobutamine  NLB room air  Abdomen soft, nd, tender to palpation RUQ, non-peritonitic  Extremities wwp    LABS: Reviewed.   Arterial Blood Gases     Recent Labs  Lab 01/06/17  1404   PH 7.46*   PCO2 34*   PO2 90   HCO3 24     Complete Blood Count     Recent Labs  Lab 01/10/17  0020 01/06/17  1422   WBC 6.2 7.3   HGB 12.0 11.8    245     Basic Metabolic Panel    Recent Labs  Lab 01/10/17  0700 01/09/17  2059 01/09/17  0739 01/08/17  1831   * 131* 138 132*   POTASSIUM 4.4 4.2 4.0 4.3   CHLORIDE 96 96 99 97   CO2 27 25 28 28   BUN 30 29 27 28   CR 1.64* 1.68* 1.51* 1.62*   GLC 92 101* 80 110*     Liver Function  Tests    Recent Labs  Lab 01/10/17  0700 01/09/17  0739 01/08/17  1248 01/07/17  1804 01/06/17  1727 01/06/17  1422 01/06/17  1011   * 265*  --   --  191* 203*  --    * 487*  --   --  202* 213*  --    ALKPHOS 79 65  --   --  73 74  --    BILITOTAL 0.9 0.8  --   --  0.9 0.8  --    ALBUMIN 3.8 3.4  --   --  3.9 3.9  --    INR 3.37*  --  3.93* 3.84*  --   --  3.32*     Pancreatic Enzymes    Recent Labs  Lab 01/10/17  0700 01/09/17  2141 01/06/17  1727   LIPASE 55* 70* 49*   AMYLASE 27* 38  --      Coagulation Profile    Recent Labs  Lab 01/10/17  0700 01/08/17  1248 01/07/17  1804 01/06/17  1011   INR 3.37* 3.93* 3.84* 3.32*         IMAGING:  Results for orders placed or performed during the hospital encounter of 01/06/17   XR Chest Port 1 View    Narrative    XR CHEST PORT 1 VW  1/6/2017 3:11 PM      HISTORY: hypoxia    COMPARISON: 12/25/2016    FINDINGS: Left-sided intracardiac defibrillator, stable positioning.  Right-sided PICC with tip projecting at the atriocaval junction.    Cardiomegaly, stable. No pleural effusion or pneumothorax. No focal  airspace opacity.      Impression    IMPRESSION:   1. Clear lungs.  2. Underlying cardiomegaly.    I have personally reviewed the examination and initial interpretation  and I agree with the findings.    KRISHNA PHIPPS MD   US Abdomen Limited    Narrative    EXAMINATION: Limited Abdominal Ultrasound, 1/7/2017 12:35 PM     COMPARISON: Abdominal ultrasound 7/30/2016, CT abdomen/pelvis  7/12/2016    HISTORY: Evaluate for right upper quadrant pain, bile duct dilatation    FINDINGS:   Fluid: No evidence of ascites or pleural effusions.    Liver: The liver demonstrates normal echotexture, measuring 17.7 cm in  craniocaudal dimension. There is no focal mass.     Gallbladder: There is a mobile, echogenic, shadowing gallstone within  the gallbladder. There is mild gallbladder wall thickening measuring  up to 6 mm. There is no pericholecystic fluid or increased  color  Doppler flow to the gallbladder wall. Sonographic Lomax's sign is  negative.    Bile Ducts: Both the intra- and extrahepatic biliary system are of  normal caliber.  The common bile duct measures 3.7 mm in diameter.    Pancreas: Visualized portions of the head and body of the pancreas are  unremarkable.     Kidney: Lobulated appearance to the right kidney secondary to the  malrotated right kidney as seen on CT abdomen/pelvis 7/12/2016. There  is no hydronephrosis or hydroureter, no shadowing renal calculi,  cystic lesion or mass.       Impression    IMPRESSION:   1.  Cholelithiasis without convincing evidence of cholecystitis. There  is mild gallbladder wall thickening measuring 6 mm but there is no  pericholecystic fluid or positive sonographic Lomax sign to suggest  cholecystitis.  2.  No rotated and lobulated appearance to the right kidney, unchanged  compared to CT abdomen/pelvis 7/12/2016.    I have personally reviewed the examination and initial interpretation  and I agree with the findings.    KRISHNA PHPIPS MD   US Abdomen Limited    Narrative    EXAMINATION: Limited Abdominal Ultrasound, 1/10/2017 8:07 AM     COMPARISON: Ultrasound right upper quadrant 1/7/2017, CT of abdomen  and pelvis 1/9/2017.    HISTORY: Right upper quadrant pain    FINDINGS:   Fluid: No evidence of ascites or pleural effusions.    Liver: The liver demonstrates normal echotexture, measuring 19.9 cm in  craniocaudal dimension. There is no focal mass.     Gallbladder: There is wall thickening between 4 to 9 mm. Shadowing  stones, which are mobile and biliary sludge are present. The patient  had pain medication prior to the exam dictated demonstrate pain upon  scanning over the gallbladder.    Bile Ducts: Both the intra- and extrahepatic biliary system are of  normal caliber.  The common bile duct measures 3 mm in diameter.    Pancreas: There are two oval hypoechoic peripancreatic nodules  adjacent to the head of the pancreas,  likely lymph nodes. The larger  of the 2 measures 1 cm x 0.7 cm x 0.8 cm. A few small periportal lymph  nodes were seen on the prior CT.    Kidney: The right kidney measures 10.6 cm long.  Malrotation/malposition again noted. There is no hydronephrosis or  mass.       Impression    IMPRESSION:   There is cholelithiasis and biliary sludge with gallbladder wall  thickening, again concerning for cholecystitis.    I have personally reviewed the examination and initial interpretation  and I agree with the findings.    KATHY MEADOWS MD   CT Abdomen Pelvis w/o Contrast     Value    Radiologist flags Possible cholecystitis (Urgent)    Narrative    EXAMINATION: CT ABDOMEN PELVIS W/O CONTRAST, 1/9/2017 10:22 PM    TECHNIQUE:  Helical CT images from the lung bases through the pubic  symphysis were obtained without IV contrast.     COMPARISON: Right upper quadrant ultrasound, 1/7/2017 and outside CT  of the abdomen and pelvis, 7/13/2016    HISTORY: severe abdominal pain    FINDINGS:    Abdomen and pelvis: There is inflammatory stranding in the  retroperitoneum and peritoneal fat of the upper abdomen most  pronounced at the level of the level of the duodenum. The gallbladder  wall is thickened, measuring approximately 4 mm in diameter, and there  is suggestion of a mild amount pericholecystic fluid. The gallbladder  is nondistended. A gallstone is better appreciated on the recent  ultrasound. The adjacent pancreas is not enlarged and the  peripancreatic fat is relatively spared. The liver is enlarged but is  otherwise within normal limits. There is mild ascites in the pelvis.  No free air.    The spleen and adrenal glands are within normal limits. Stable  malrotation of the right kidney, the kidneys are otherwise within  normal limits. The stomach, small bowel, colon, and appendix are  within normal limits. There is an intrauterine device in good  position, the uterus and adnexa are otherwise within normal limits for  the  patient's age. Noncontrast evaluation of the major abdominal  vessels is within normal limits. There are no enlarged lymph nodes in  the abdomen or pelvis.    Lung bases:  Partially visualized AICD leads appear unchanged. The  heart is stably enlarged. Linear opacities in the left lower lobe are  consistent with scarring or atelectasis, the visualized lungs are  otherwise clear.    Bones and soft tissues: No acute osseous abnormality or suspicious  bony lesion. L3 pars interarticularis defects without significant  listhesis.      Impression    IMPRESSION: Mild inflammatory changes in the upper abdomen, most  pronounced about the gallbladder which demonstrates wall  thickening/edema. Findings may represent acute cholecystitis and could  be further evaluated with ultrasound/nuclear medicine hepatobiliary  study.     [Urgent Result: Possible cholecystitis]    Finding was identified on 1/9/2017 10:24 PM.     Dr. Hogue was contacted by Dr. Smith at 1/9/2017 10:47 PM and  verbalized understanding of the urgent finding.     I have personally reviewed the examination and initial interpretation  and I agree with the findings.    KATHY MEADOWS MD

## 2017-01-10 NOTE — PLAN OF CARE
Problem: Goal Outcome Summary  Goal: Goal Outcome Summary  1. Pt will be hemodynamically stable  2. Pt s pain will be managed within stated goal  Outcome: No change    DIAP:  Admitted 1/6 for volume overload and syncope.  VSS on room air/  SR, 80-90's.  Minimal complaints of abdominal pain, relieved with heat packs.  Dobutamine continues at 7.5 mcg/kg/min.  Scheduled IV Bumex, switched to PO, good UOP throughout day.  Pt expressing thoughts about decreased quality of life.  Up ambulating in halls x 3 today.  Continue with plan of care and notify team with changes or concerns.

## 2017-01-10 NOTE — PLAN OF CARE
Problem: Goal Outcome Summary  Goal: Goal Outcome Summary  1. Pt will be hemodynamically stable  2. Pt s pain will be managed within stated goal  Outcome: No change    DIAP:  Pt admitted 1/6/17 for volume overload and syncope.  VSS on room air. SR, 80-90's. At start of shift pt tearful in room and c/o severe intolerable abdominal pain not relieved with heat packs, GI cocktail, or Acetaminophen. MD paged and orders placed for abdominal CT. This showed possible cholecystitis. Order for Dilaudid IV PRN placed and pt received this x 2 overnight. Pt reports adequate pain relief each time. Plan is for possible nuclear medicine  hepatobiliary study or abdominal US this morning. Pt has been NPO since midnight. Dobutamine gtt continues at 7.5 mcg/kg/min.  RN will continue to monitor pt notify team with changes or concerns.     Shira Arevalo

## 2017-01-10 NOTE — PROGRESS NOTES
"[facility] [place]  Cardiology II Service  Daily Note  January 10, 2017      Interval History:   Nursing notes reviewed. She developed worsening right sided abdominal pain last night with some nausea. CT abdomen GB wall thickening and inflammation in the are suspicious of cholecystitis. Pain is controlled with dilaudid prn, she denies nausea, fever or chills. General surgery is consulted.        Pertinent Medications:  Current Facility-Administered Medications    Medication       bumetanide (BUMEX) tablet 3 mg       warfarin-No DOSE today       spironolactone (ALDACTONE) half-tab 12.5 mg       DOBUTamine (DOBUTREX) 1,000 mg in D5W 250 mL infusion (adult max conc)       digoxin (LANOXIN) tablet 125 mcg                Examination:  BP 93/73 mmHg  Pulse 82  Temp(Src) 97.7  F (36.5  C) (Oral)  Resp 18  Ht 1.6 m (5' 3\")  Wt 72.122 kg (159 lb)  BMI 28.17 kg/m2  SpO2 98%  GEN: A & O,  resting comfortably in bed, NAD, cooperative and conversational    HEENT: no scleral icterus, mucus membranes moist  NECK: Supple,  JVP at upper neck at 30 degree angle, negative HJR  RESP: CTA bilaterally, no wheezing or crackles, no increased work of breathing    CV: Regular, normal rate, normal S1 and S2 without m/r/g    ABD: Soft, tender in RUQ w/ positive Lomax's sign and some guarding, no rigidity or peritoneal signs  EXT: No peripheral edema, warm/well perfused   NEURO: no gross focal deficits    Data:  CMP  Recent Labs  Lab 01/09/17  2059 01/09/17  0739 01/08/17  1831 01/08/17  0720  01/06/17  1727 01/06/17  1422   * 138 132* 135  < > 134  --    POTASSIUM 4.2 4.0 4.3 4.1  < > 4.4  --    CHLORIDE 96 99 97 100  < > 99  --    CO2 25 28 28 26  < > 24  --    ANIONGAP 11 10 8 8  < > 10  --    * 80 110* 86  < > 101*  --    BUN 29 27 28 28  < > 28  --    CR 1.68* 1.51* 1.62* 1.55*  < > 1.29*  --    GFRESTIMATED 37* 42* 39* 41*  < > 51*  --    GFRESTBLACK 45* 51* 47* 50*  < > 61  --    MARIO " 8.9 8.3* 8.7 9.3  < > 9.0  --    MAG 2.6* 2.0 2.2 2.3  < > 2.0 2.1   PROTTOTAL  --  6.5*  --   --   --  7.2 7.5   ALBUMIN  --  3.4  --   --   --  3.9 3.9   BILITOTAL  --  0.8  --   --   --  0.9 0.8   ALKPHOS  --  65  --   --   --  73 74   AST  --  265*  --   --   --  191* 203*   ALT  --  487*  --   --   --  202* 213*   < > = values in this interval not displayed.  CBC  Recent Labs  Lab 01/10/17  0020 01/06/17  1422   WBC 6.2 7.3   RBC 4.12 4.09   HGB 12.0 11.8   HCT 38.7 37.8   MCV 94 92   MCH 29.1 28.9   MCHC 31.0* 31.2*   RDW 16.1* 15.9*    245     INR  Recent Labs  Lab 01/08/17  1248 01/07/17  1804 01/06/17  1011   INR 3.93* 3.84* 3.32*     Arterial Blood Gas  Recent Labs  Lab 01/06/17  1404   PH 7.46*   PCO2 34*   PO2 90   HCO3 24   O2PER 21       Imaging Studies:  US abdomen:  IMPRESSION:    There is cholelithiasis and biliary sludge with gallbladder wall  thickening, again concerning for cholecystitis.    CT abd:                                                    IMPRESSION: Mild inflammatory changes in the upper abdomen, most  pronounced about the gallbladder which demonstrates wall  thickening/edema. Findings may represent acute cholecystitis and could  be further evaluated with ultrasound/nuclear medicine hepatobiliary  study.         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    Updates:  - decrease bumex to 3mg bid given NPO status  - started ertapenem for cholecystitis and Gen surgery consult. Pain and nausea management as below.     # Acute on chronic systolic heart failure with EF 20-25% s/p ICD / NICM  secondary to polysubstance abuse: now euvolumic    NYHA Class IIIb, Stage D. Presented with HF exacerbation, diuresed. Now on dobutamine 7.5mcg/kg/min and bumex   8th admission for heart failure since being diagnosed in July 2016. Anne is unable to be considered for advanced heart failure therapies, including LVAD and heart transplant, until she is sober for 1 year. Most recent known substance use was marijuana in October 2016.   - Continue dobutamine at 7.5 mcg/kg/min (increased on 1/7/17)  - oral bumex 3mg bid  - Patient and family wondering about second opinion to get LVAD sooner.   She will see Dr. 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 Improvied w/ dobutamine, now stable  - f/u Cr and lytes  - avoid nephrotoxins    # Hyperkalemia, resolved    # Bilateral PE    # Supratherapeutic INR  PE diagnosed at OSH 7/2016.  Anticoagulated with warfarin, goal INR 2-3. INR 3.32 on admission  - Warfarin per pharmacy  - Daily INR    # Acute on chronic normocytic anemia  Last iron studies suggest anemia of chronic disease, likely from cardiomyopathy. Hgb at baseline of ~ 11.0  - Continue iron supplements    # Anxiety/Depression:     Given social circumstances  surround her current medical status, was visibly upset multiple times during previous admission. Psychiatry saw her on 12/14 and recommended to increase cymbalta and for her to be referred back to her established psychiatrist at discharge with recommendations for follow up with psychotherapist.    - Continue buspar, cymbalta, atarax  - NO benzos    FEN: NPO  Prophylaxis: on warfarin  Code Status: full  Disposition: likely discharge after resolution of cholecystitis    Plan of care discussed with Dr. Aroldo Mixon MD  PGY3 Internal Medicine  [phone]      I personally saw and examined this patient, reviewed imaging and laboratory studies, confirmed physical examination and discussed results and plan with patient and or family.

## 2017-01-10 NOTE — PLAN OF CARE
Problem: Goal Outcome Summary  Goal: Goal Outcome Summary  1. Pt will be hemodynamically stable  2. 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  Pt is alert and oriented.  VSS on room air.  Sinus rhythm.  No complaints of nausea, abdominal pain controlled with PRN IV dilaudid.  Abdominal US and CT done this AM, concerns for cholecystitis.  Surgery consult.  MANGO MCADAMS scan this afternoon.  IV ertapenem Q 24 hrs.  Dobutamine continues at 7.5 mcg/kg/min.  Continues to be NPO, sips okay per MD.  Possible percutaneous drain vs laparoscopy based on scan-  per MD surgery, pt too high risk for surgery, continue with controlling pain/N.  K+ replaced.  Continue with plan of care and notify team with changes/concerns.

## 2017-01-11 LAB
ALBUMIN SERPL-MCNC: 3.7 G/DL (ref 3.4–5)
ALBUMIN SERPL-MCNC: 3.7 G/DL (ref 3.4–5)
ALP SERPL-CCNC: 71 U/L (ref 40–150)
ALP SERPL-CCNC: 72 U/L (ref 40–150)
ALT SERPL W P-5'-P-CCNC: 511 U/L (ref 0–50)
ALT SERPL W P-5'-P-CCNC: 577 U/L (ref 0–50)
ANION GAP SERPL CALCULATED.3IONS-SCNC: 10 MMOL/L (ref 3–14)
ANION GAP SERPL CALCULATED.3IONS-SCNC: 13 MMOL/L (ref 3–14)
AST SERPL W P-5'-P-CCNC: 188 U/L (ref 0–45)
AST SERPL W P-5'-P-CCNC: 236 U/L (ref 0–45)
BILIRUB DIRECT SERPL-MCNC: 0.3 MG/DL (ref 0–0.2)
BILIRUB SERPL-MCNC: 1 MG/DL (ref 0.2–1.3)
BILIRUB SERPL-MCNC: 1 MG/DL (ref 0.2–1.3)
BUN SERPL-MCNC: 31 MG/DL (ref 7–30)
BUN SERPL-MCNC: 35 MG/DL (ref 7–30)
CALCIUM SERPL-MCNC: 8.4 MG/DL (ref 8.5–10.1)
CALCIUM SERPL-MCNC: 8.9 MG/DL (ref 8.5–10.1)
CHLORIDE SERPL-SCNC: 91 MMOL/L (ref 94–109)
CHLORIDE SERPL-SCNC: 95 MMOL/L (ref 94–109)
CO2 SERPL-SCNC: 27 MMOL/L (ref 20–32)
CO2 SERPL-SCNC: 28 MMOL/L (ref 20–32)
CREAT SERPL-MCNC: 1.34 MG/DL (ref 0.52–1.04)
CREAT SERPL-MCNC: 1.56 MG/DL (ref 0.52–1.04)
GFR SERPL CREATININE-BSD FRML MDRD: 41 ML/MIN/1.7M2
GFR SERPL CREATININE-BSD FRML MDRD: 48 ML/MIN/1.7M2
GLUCOSE BLDC GLUCOMTR-MCNC: 75 MG/DL (ref 70–99)
GLUCOSE BLDC GLUCOMTR-MCNC: 90 MG/DL (ref 70–99)
GLUCOSE SERPL-MCNC: 69 MG/DL (ref 70–99)
GLUCOSE SERPL-MCNC: 78 MG/DL (ref 70–99)
INR PPP: 2.67 (ref 0.86–1.14)
MAGNESIUM SERPL-MCNC: 2.1 MG/DL (ref 1.6–2.3)
POTASSIUM SERPL-SCNC: 3.8 MMOL/L (ref 3.4–5.3)
POTASSIUM SERPL-SCNC: 4.5 MMOL/L (ref 3.4–5.3)
PROT SERPL-MCNC: 6.7 G/DL (ref 6.8–8.8)
PROT SERPL-MCNC: 7 G/DL (ref 6.8–8.8)
SODIUM SERPL-SCNC: 131 MMOL/L (ref 133–144)
SODIUM SERPL-SCNC: 133 MMOL/L (ref 133–144)

## 2017-01-11 PROCEDURE — 00000146 ZZHCL STATISTIC GLUCOSE BY METER IP

## 2017-01-11 PROCEDURE — 21400003 ZZH R&B CCU CRITICAL UMMC

## 2017-01-11 PROCEDURE — 82248 BILIRUBIN DIRECT: CPT | Performed by: INTERNAL MEDICINE

## 2017-01-11 PROCEDURE — 25000125 ZZHC RX 250: Performed by: INTERNAL MEDICINE

## 2017-01-11 PROCEDURE — 25000132 ZZH RX MED GY IP 250 OP 250 PS 637: Performed by: INTERNAL MEDICINE

## 2017-01-11 PROCEDURE — 85610 PROTHROMBIN TIME: CPT | Performed by: INTERNAL MEDICINE

## 2017-01-11 PROCEDURE — 40000802 ZZH SITE CHECK

## 2017-01-11 PROCEDURE — 80053 COMPREHEN METABOLIC PANEL: CPT | Performed by: INTERNAL MEDICINE

## 2017-01-11 PROCEDURE — 25900017 H RX MED GY IP 259 OP 259 PS 637: Performed by: INTERNAL MEDICINE

## 2017-01-11 PROCEDURE — 83735 ASSAY OF MAGNESIUM: CPT | Performed by: INTERNAL MEDICINE

## 2017-01-11 PROCEDURE — 99233 SBSQ HOSP IP/OBS HIGH 50: CPT | Mod: GC | Performed by: INTERNAL MEDICINE

## 2017-01-11 PROCEDURE — 36415 COLL VENOUS BLD VENIPUNCTURE: CPT | Performed by: INTERNAL MEDICINE

## 2017-01-11 PROCEDURE — 25000132 ZZH RX MED GY IP 250 OP 250 PS 637

## 2017-01-11 RX ORDER — BUMETANIDE 1 MG/1
3 TABLET ORAL 3 TIMES DAILY
Status: DISCONTINUED | OUTPATIENT
Start: 2017-01-11 | End: 2017-01-12

## 2017-01-11 RX ORDER — WARFARIN SODIUM 3 MG/1
3 TABLET ORAL
Status: COMPLETED | OUTPATIENT
Start: 2017-01-11 | End: 2017-01-11

## 2017-01-11 RX ORDER — OXYCODONE HYDROCHLORIDE 5 MG/1
5 TABLET ORAL EVERY 4 HOURS PRN
Status: DISCONTINUED | OUTPATIENT
Start: 2017-01-11 | End: 2017-01-13

## 2017-01-11 RX ADMIN — BUMETANIDE 3 MG: 1 TABLET ORAL at 09:56

## 2017-01-11 RX ADMIN — ERTAPENEM SODIUM 1 G: 1 INJECTION, POWDER, LYOPHILIZED, FOR SOLUTION INTRAMUSCULAR; INTRAVENOUS at 11:17

## 2017-01-11 RX ADMIN — HYDROXYZINE HYDROCHLORIDE 50 MG: 50 TABLET, FILM COATED ORAL at 20:03

## 2017-01-11 RX ADMIN — BUMETANIDE 3 MG: 1 TABLET ORAL at 13:59

## 2017-01-11 RX ADMIN — IRON 325 MG: 65 TABLET ORAL at 09:57

## 2017-01-11 RX ADMIN — PANTOPRAZOLE SODIUM 20 MG: 20 TABLET, DELAYED RELEASE ORAL at 09:57

## 2017-01-11 RX ADMIN — Medication 12.5 MG: at 09:56

## 2017-01-11 RX ADMIN — WARFARIN SODIUM 3 MG: 3 TABLET ORAL at 20:05

## 2017-01-11 RX ADMIN — POTASSIUM CHLORIDE 20 MEQ: 1500 TABLET, EXTENDED RELEASE ORAL at 21:44

## 2017-01-11 RX ADMIN — BUMETANIDE 3 MG: 1 TABLET ORAL at 17:57

## 2017-01-11 RX ADMIN — IRON 325 MG: 65 TABLET ORAL at 20:03

## 2017-01-11 RX ADMIN — BUSPIRONE HYDROCHLORIDE 30 MG: 15 TABLET ORAL at 09:57

## 2017-01-11 RX ADMIN — BUSPIRONE HYDROCHLORIDE 30 MG: 15 TABLET ORAL at 13:59

## 2017-01-11 RX ADMIN — DIGOXIN 125 MCG: 125 TABLET ORAL at 09:57

## 2017-01-11 RX ADMIN — HYDROMORPHONE HYDROCHLORIDE 0.5 MG: 10 INJECTION, SOLUTION INTRAMUSCULAR; INTRAVENOUS; SUBCUTANEOUS at 08:22

## 2017-01-11 RX ADMIN — OXYCODONE HYDROCHLORIDE 5 MG: 5 TABLET ORAL at 16:35

## 2017-01-11 RX ADMIN — HYDROXYZINE HYDROCHLORIDE 50 MG: 50 TABLET, FILM COATED ORAL at 13:59

## 2017-01-11 RX ADMIN — ACETAMINOPHEN 650 MG: 325 TABLET, FILM COATED ORAL at 16:35

## 2017-01-11 RX ADMIN — OXYCODONE HYDROCHLORIDE 5 MG: 5 TABLET ORAL at 11:17

## 2017-01-11 RX ADMIN — OXYCODONE HYDROCHLORIDE 5 MG: 5 TABLET ORAL at 20:05

## 2017-01-11 RX ADMIN — DULOXETINE 90 MG: 30 CAPSULE, DELAYED RELEASE ORAL at 09:56

## 2017-01-11 RX ADMIN — HYDROXYZINE HYDROCHLORIDE 50 MG: 50 TABLET, FILM COATED ORAL at 09:57

## 2017-01-11 RX ADMIN — OXYCODONE HYDROCHLORIDE 5 MG: 5 TABLET ORAL at 23:56

## 2017-01-11 RX ADMIN — HYDROMORPHONE HYDROCHLORIDE 0.5 MG: 10 INJECTION, SOLUTION INTRAMUSCULAR; INTRAVENOUS; SUBCUTANEOUS at 00:03

## 2017-01-11 RX ADMIN — BUSPIRONE HYDROCHLORIDE 30 MG: 15 TABLET ORAL at 20:03

## 2017-01-11 RX ADMIN — HYDROMORPHONE HYDROCHLORIDE 0.5 MG: 10 INJECTION, SOLUTION INTRAMUSCULAR; INTRAVENOUS; SUBCUTANEOUS at 04:03

## 2017-01-11 RX ADMIN — DOBUTAMINE 7.5 MCG/KG/MIN: 12.5 INJECTION, SOLUTION, CONCENTRATE INTRAVENOUS at 11:17

## 2017-01-11 ASSESSMENT — PAIN DESCRIPTION - DESCRIPTORS
DESCRIPTORS: DISCOMFORT
DESCRIPTORS: DISCOMFORT;CONSTANT
DESCRIPTORS: CONSTANT;DISCOMFORT
DESCRIPTORS: CONSTANT
DESCRIPTORS: DISCOMFORT

## 2017-01-11 NOTE — PROGRESS NOTES
"[facility] [place]  Cardiology II Service  Daily Note  January 11, 2017      Interval History:   Nursing notes reviewed. Her pain is controlled with current regimen, she denies any nausea. No fever, chills or night sweats.     Pertinent Medications:  bumex  Warfarin  Spironolactone  Dobutamine  digoxin      Examination:  /77 mmHg  Pulse 82  Temp(Src) 96  F (35.6  C) (Axillary)  Resp 16  Ht 1.6 m (5' 3\")  Wt 72.576 kg (160 lb)  BMI 28.35 kg/m2  SpO2 96%  GEN: A & O,  resting comfortably in bed,  cooperative and conversational    HEENT: no scleral icterus, mucus membranes moist  NECK: Supple,  JVP at ear lobe at 30 degree angle, negative HJR.   Bedside echo showed IVC ~ 2cm w/ good respiratory variation (> 50%)  RESP: CTA bilaterally, no wheezing or crackles    CV: Regular, normal rate, normal S1 and S2 without m/r/g    ABD: Soft, tender in RUQ w/ positive Lomax's sign and some guarding, tenderness + in epigastrium and RLQ, no rigidity or peritoneal signs  EXT: No peripheral edema, warm/well perfused   NEURO: no gross focal deficits    Data:  CMP  Recent Labs  Lab 01/10/17  1747 01/10/17  0700 01/09/17  2059 01/09/17  0739 01/08/17  1831  01/06/17  1727   * 132* 131* 138 132*  < > 134   POTASSIUM 4.0 4.4 4.2 4.0 4.3  < > 4.4   CHLORIDE 96 96 96 99 97  < > 99   CO2 24 27 25 28 28  < > 24   ANIONGAP 13 9 11 10 8  < > 10   * 92 101* 80 110*  < > 101*   BUN 34* 30 29 27 28  < > 28   CR 1.59* 1.64* 1.68* 1.51* 1.62*  < > 1.29*   GFRESTIMATED 40* 38* 37* 42* 39*  < > 51*   GFRESTBLACK 48* 46* 45* 51* 47*  < > 61   MARIO 8.5 9.2 8.9 8.3* 8.7  < > 9.0   MAG  --  2.6* 2.6* 2.0 2.2  < > 2.0   PROTTOTAL 7.2 7.4  --  6.5*  --   --  7.2   ALBUMIN 3.7 3.8  --  3.4  --   --  3.9   BILITOTAL 0.8 0.9  --  0.8  --   --  0.9   ALKPHOS 82 79  --  65  --   --  73   * 318*  --  265*  --   --  191*   * 602*  --  487*  --   --  202*   < > = values in this interval not " displayed.  CBC  Recent Labs  Lab 01/10/17  0020 01/06/17  1422   WBC 6.2 7.3   RBC 4.12 4.09   HGB 12.0 11.8   HCT 38.7 37.8   MCV 94 92   MCH 29.1 28.9   MCHC 31.0* 31.2*   RDW 16.1* 15.9*    245     INR  Recent Labs  Lab 01/10/17  0700 01/08/17  1248 01/07/17  1804 01/06/17  1011   INR 3.37* 3.93* 3.84* 3.32*     Arterial Blood Gas  Recent Labs  Lab 01/06/17  1404   PH 7.46*   PCO2 34*   PO2 90   HCO3 24   O2PER 21     HIDA scan   Impression:     Abnormal hepatobiliary scan wit low ejection fraction. This can be  seen with chronic cholecystitis.         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    Updates:  - liver enzymes down-trending  - changed IV dilaudid to oxycodone per patient/ mother's request  - continue diuresis with bumex 3mg oral tid today    # Acute on chronic systolic heart failure with EF 20-25% s/p ICD / NICM secondary to polysubstance abuse: now euvolumic    NYHA Class IIIb, Stage D. Presented with HF exacerbation, diuresed. Now on dobutamine 7.5mcg/kg/min and bumex   8th admission for heart failure since being diagnosed in July 2016. Anne is unable to be considered for advanced heart failure therapies, including LVAD and heart transplant, until she is sober for 1 year. Most recent known substance use was marijuana in October 2016.    - Continue dobutamine at 7.5 mcg/kg/min (increased on 1/7/17)  - oral bumex 3mg tid  - Patient and family wondering about second opinion to get LVAD sooner.    She will see Dr. Baeza in Clinic after discharge  - Daily weights, strict I&Os  - Previously on  lisinopril, but stopped when dobutamine was started  - Beta blocker deferred due to low cardiac output  - continue spironolactone at 12.5 mg daily (home dose 25mg qday)  - Continue digoxin 125mcg qday      # Non-infectious aortic valve vegetation (EVAN 2016)    # Gallstones, suspected cholecystitis: US and CT showing GB wall thickening and inflammation w/ cholelithiasis  No CBD dilation  - NPO  - oxycodone prn for pain  - zofran prn for nausea  - continue ertapenem  - General surgery consulted for lap cholecystectomy vs percutaneous drainage. Given that HIDA scan was negative for cholecystitis (although reduced EF), surgery team recommends holding off on any procedures given high operative risk and questionable improvement in symptoms with the surgery    # Elevated trans-aminases/ improvin/2 gallstones  - continue to trend bid    # ROBERT on CKD/ improving: Cr 1.6, baseline 1-1.3   Very likely from poor renal perfusion from low output state. Improved w/ dobutamine, now stable  - f/u Cr and lytes  - avoid nephrotoxins    # Hyperkalemia, resolved    # Bilateral PE    - Warfarin per pharmacy  - Daily INR    # Chronic normocytic anemia  Last iron studies suggest anemia of chronic disease, likely from cardiomyopathy. Hgb at baseline of ~ 11.0  - Continue iron supplements    # Anxiety/Depression:     Given social circumstances surround her current medical status, was visibly upset multiple times during previous admission. Psychiatry saw her on  and recommended to increase cymbalta and for her to be referred back to her established psychiatrist at discharge with recommendations for follow up with psychotherapist.    - Continue buspar, cymbalta, atarax  - NO benzos    FEN: NPO  Prophylaxis: on warfarin  Code Status: full  Disposition: likely discharge after resolution of cholecystitis    Plan of care discussed with Dr. Aroldo Mixon MD  PGY3 Internal Medicine  [phone]      I personally saw and examined  this patient, reviewed imaging and laboratory studies, confirmed physical examination and discussed results and plan with patient and or family.

## 2017-01-11 NOTE — PLAN OF CARE
Problem: Goal Outcome Summary  Goal: Goal Outcome Summary  1. Pt will be hemodynamically stable  2. 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      A/I: Pt is alert and oriented x 4. Pt VSS on room air. Pt in Sinus rhythm.  No complaints of nausea overnight. Pt has ongoing severe abdominal pain controlled with PRN IV dilaudid q4h.  Abdominal US, CT, and NM Hepatobiliary scan done yesterday. Results show concerns for cholecystitis.    P: Surgery consult done (see note). Note suggested to possibly place a percutaneous cholecystostomy tube. Unclear if this will be done. Per last shift report pt too high risk for procedure. Plan overnight is to continue controlling pain and nausea. Pt received IV Dilaudid x 3 overnight. Pt on IV ertapenem Q 24 hrs. Dobutamine continues at 7.5 mcg/kg/min. Pt continues to be NPO, sips okay per MD. RN will continue to monitor and notify team with changes/concerns.       Shira Arevalo

## 2017-01-11 NOTE — PROGRESS NOTES
Care Coordinator Progress Note     Admission Date/Time:  1/6/2017  Attending MD:  Elvira Storey MD   Data  Chart reviewed, discussed with interdisciplinary team.   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    Concerns with insurance coverage for discharge needs: None.  Current Living Situation: Patient lives with her mother.   Support System: Supportive and Involved  Services Involved:  [facility], [facility], [facility].   Transportation: pt's mother will provide pt with a ride home.  Barriers to Discharge: acute illness.   Coordination of Care and Referrals: Provided patient/family with options for resumption of home infusion and home care.      Assessment  Pt's mother said that pt only has one of her home pumps for Dobutamine drip with her; [facility] informed.   Intervention:   Arrangements made with [facility] (Ph: [phone] Fax: [phone])  and [facility] (Ph: [phone] Fax: [phone]) for resumption of home Dobutamine drip and line care, INR lab draws.     Plan  Anticipated Discharge Date:  TBD  Anticipated Discharge Plan:  Discharge to home with home infusion.   CC will continue to monitor pt's medical condition and progress toward discharge.   WAQAS ESPINOZA RN BSN  Care Coordinator

## 2017-01-11 NOTE — PLAN OF CARE
Problem: Goal Outcome Summary  Goal: Goal Outcome Summary  1. Pt will be hemodynamically stable  2. Pt s pain will be managed within stated goal  Outcome: Improving  Patient admitted with fluid up, nausea and severe abdominal pain-syncopal episode. Patient is alert and oriented this morning x4. VSS on room air. Sinus rhythm. No complaints of nausea. Continues to have anterior abdominal pain-given PRN dilaudid x1 and PRN oxycodone x1 with temporary relief. Continues to use hot packs as well. Patient states she feels clammy this morning, BG checked at 90. Voiding, NPO. Dobutamine gtt at 7.5 mcg/kg/min. Also received antibiotic as ordered. Up independently in hallways, supportive mom at bedside. Patient would like to discuss possibly restarting food. Continue to monitor and notify Cards 2 team with changes/concerns.

## 2017-01-12 ENCOUNTER — APPOINTMENT (OUTPATIENT)
Dept: GENERAL RADIOLOGY | Facility: CLINIC | Age: 27
DRG: 286 | End: 2017-01-12
Attending: INTERNAL MEDICINE
Payer: COMMERCIAL

## 2017-01-12 LAB
ALBUMIN SERPL-MCNC: 3.3 G/DL (ref 3.4–5)
ALBUMIN SERPL-MCNC: 3.4 G/DL (ref 3.4–5)
ALP SERPL-CCNC: 68 U/L (ref 40–150)
ALP SERPL-CCNC: 70 U/L (ref 40–150)
ALT SERPL W P-5'-P-CCNC: 391 U/L (ref 0–50)
ALT SERPL W P-5'-P-CCNC: 427 U/L (ref 0–50)
ANION GAP SERPL CALCULATED.3IONS-SCNC: 10 MMOL/L (ref 3–14)
ANION GAP SERPL CALCULATED.3IONS-SCNC: 11 MMOL/L (ref 3–14)
AST SERPL W P-5'-P-CCNC: 118 U/L (ref 0–45)
AST SERPL W P-5'-P-CCNC: 143 U/L (ref 0–45)
BILIRUB SERPL-MCNC: 0.8 MG/DL (ref 0.2–1.3)
BILIRUB SERPL-MCNC: 1.5 MG/DL (ref 0.2–1.3)
BUN SERPL-MCNC: 22 MG/DL (ref 7–30)
BUN SERPL-MCNC: 25 MG/DL (ref 7–30)
CALCIUM SERPL-MCNC: 8 MG/DL (ref 8.5–10.1)
CALCIUM SERPL-MCNC: 8 MG/DL (ref 8.5–10.1)
CHLORIDE SERPL-SCNC: 90 MMOL/L (ref 94–109)
CHLORIDE SERPL-SCNC: 96 MMOL/L (ref 94–109)
CO2 SERPL-SCNC: 29 MMOL/L (ref 20–32)
CO2 SERPL-SCNC: 32 MMOL/L (ref 20–32)
CREAT SERPL-MCNC: 0.65 MG/DL (ref 0.52–1.04)
CREAT SERPL-MCNC: 1.4 MG/DL (ref 0.52–1.04)
CRP SERPL-MCNC: 54 MG/L (ref 0–8)
DIGOXIN SERPL-MCNC: 1.4 UG/L (ref 0.5–2)
ERYTHROCYTE [DISTWIDTH] IN BLOOD BY AUTOMATED COUNT: 15.6 % (ref 10–15)
ERYTHROCYTE [DISTWIDTH] IN BLOOD BY AUTOMATED COUNT: 15.8 % (ref 10–15)
GFR SERPL CREATININE-BSD FRML MDRD: 46 ML/MIN/1.7M2
GFR SERPL CREATININE-BSD FRML MDRD: ABNORMAL ML/MIN/1.7M2
GLUCOSE BLDC GLUCOMTR-MCNC: 124 MG/DL (ref 70–99)
GLUCOSE SERPL-MCNC: 133 MG/DL (ref 70–99)
GLUCOSE SERPL-MCNC: 191 MG/DL (ref 70–99)
HCT VFR BLD AUTO: 35.2 % (ref 35–47)
HCT VFR BLD AUTO: 37 % (ref 35–47)
HGB BLD-MCNC: 10.9 G/DL (ref 11.7–15.7)
HGB BLD-MCNC: 11.4 G/DL (ref 11.7–15.7)
INR PPP: 2.94 (ref 0.86–1.14)
LACTATE BLD-SCNC: 2 MMOL/L (ref 0.7–2.1)
MAGNESIUM SERPL-MCNC: 2 MG/DL (ref 1.6–2.3)
MCH RBC QN AUTO: 29.1 PG (ref 26.5–33)
MCH RBC QN AUTO: 29.2 PG (ref 26.5–33)
MCHC RBC AUTO-ENTMCNC: 30.8 G/DL (ref 31.5–36.5)
MCHC RBC AUTO-ENTMCNC: 31 G/DL (ref 31.5–36.5)
MCV RBC AUTO: 94 FL (ref 78–100)
MCV RBC AUTO: 95 FL (ref 78–100)
NT-PROBNP SERPL-MCNC: 7146 PG/ML (ref 0–450)
PHOSPHATE SERPL-MCNC: 4.6 MG/DL (ref 2.5–4.5)
PLATELET # BLD AUTO: 190 10E9/L (ref 150–450)
PLATELET # BLD AUTO: 195 10E9/L (ref 150–450)
POTASSIUM SERPL-SCNC: 3.5 MMOL/L (ref 3.4–5.3)
POTASSIUM SERPL-SCNC: 4.3 MMOL/L (ref 3.4–5.3)
PROT SERPL-MCNC: 6.3 G/DL (ref 6.8–8.8)
PROT SERPL-MCNC: 6.6 G/DL (ref 6.8–8.8)
RBC # BLD AUTO: 3.75 10E12/L (ref 3.8–5.2)
RBC # BLD AUTO: 3.9 10E12/L (ref 3.8–5.2)
SODIUM SERPL-SCNC: 133 MMOL/L (ref 133–144)
SODIUM SERPL-SCNC: 135 MMOL/L (ref 133–144)
TROPONIN I SERPL-MCNC: 0.06 UG/L (ref 0–0.04)
WBC # BLD AUTO: 4.4 10E9/L (ref 4–11)
WBC # BLD AUTO: 4.9 10E9/L (ref 4–11)

## 2017-01-12 PROCEDURE — 36415 COLL VENOUS BLD VENIPUNCTURE: CPT | Performed by: INTERNAL MEDICINE

## 2017-01-12 PROCEDURE — 80053 COMPREHEN METABOLIC PANEL: CPT | Performed by: INTERNAL MEDICINE

## 2017-01-12 PROCEDURE — 25000132 ZZH RX MED GY IP 250 OP 250 PS 637: Performed by: INTERNAL MEDICINE

## 2017-01-12 PROCEDURE — 40000802 ZZH SITE CHECK

## 2017-01-12 PROCEDURE — 83735 ASSAY OF MAGNESIUM: CPT | Performed by: INTERNAL MEDICINE

## 2017-01-12 PROCEDURE — 85610 PROTHROMBIN TIME: CPT | Performed by: INTERNAL MEDICINE

## 2017-01-12 PROCEDURE — 25000125 ZZHC RX 250: Performed by: INTERNAL MEDICINE

## 2017-01-12 PROCEDURE — 83880 ASSAY OF NATRIURETIC PEPTIDE: CPT | Performed by: INTERNAL MEDICINE

## 2017-01-12 PROCEDURE — 71010 XR CHEST PORT 1 VW: CPT

## 2017-01-12 PROCEDURE — 84100 ASSAY OF PHOSPHORUS: CPT | Performed by: INTERNAL MEDICINE

## 2017-01-12 PROCEDURE — 00000146 ZZHCL STATISTIC GLUCOSE BY METER IP

## 2017-01-12 PROCEDURE — 83605 ASSAY OF LACTIC ACID: CPT | Performed by: INTERNAL MEDICINE

## 2017-01-12 PROCEDURE — 99233 SBSQ HOSP IP/OBS HIGH 50: CPT | Mod: 25 | Performed by: INTERNAL MEDICINE

## 2017-01-12 PROCEDURE — 84484 ASSAY OF TROPONIN QUANT: CPT | Performed by: INTERNAL MEDICINE

## 2017-01-12 PROCEDURE — 25900017 H RX MED GY IP 259 OP 259 PS 637: Performed by: INTERNAL MEDICINE

## 2017-01-12 PROCEDURE — 93005 ELECTROCARDIOGRAM TRACING: CPT

## 2017-01-12 PROCEDURE — 21400003 ZZH R&B CCU CRITICAL UMMC

## 2017-01-12 PROCEDURE — 85027 COMPLETE CBC AUTOMATED: CPT | Performed by: INTERNAL MEDICINE

## 2017-01-12 PROCEDURE — 86140 C-REACTIVE PROTEIN: CPT | Performed by: INTERNAL MEDICINE

## 2017-01-12 PROCEDURE — 80162 ASSAY OF DIGOXIN TOTAL: CPT | Performed by: INTERNAL MEDICINE

## 2017-01-12 RX ORDER — PANTOPRAZOLE SODIUM 40 MG/1
40 TABLET, DELAYED RELEASE ORAL EVERY MORNING
Status: DISCONTINUED | OUTPATIENT
Start: 2017-01-13 | End: 2017-01-20 | Stop reason: HOSPADM

## 2017-01-12 RX ORDER — HYDROMORPHONE HYDROCHLORIDE 1 MG/ML
.3-.5 INJECTION, SOLUTION INTRAMUSCULAR; INTRAVENOUS; SUBCUTANEOUS ONCE
Status: COMPLETED | OUTPATIENT
Start: 2017-01-12 | End: 2017-01-12

## 2017-01-12 RX ADMIN — OXYCODONE HYDROCHLORIDE 5 MG: 5 TABLET ORAL at 13:56

## 2017-01-12 RX ADMIN — DOBUTAMINE 6 MCG/KG/MIN: 12.5 INJECTION, SOLUTION, CONCENTRATE INTRAVENOUS at 21:13

## 2017-01-12 RX ADMIN — IRON 325 MG: 65 TABLET ORAL at 19:36

## 2017-01-12 RX ADMIN — PANTOPRAZOLE SODIUM 20 MG: 20 TABLET, DELAYED RELEASE ORAL at 08:09

## 2017-01-12 RX ADMIN — DULOXETINE 90 MG: 30 CAPSULE, DELAYED RELEASE ORAL at 08:08

## 2017-01-12 RX ADMIN — DIGOXIN 125 MCG: 125 TABLET ORAL at 08:07

## 2017-01-12 RX ADMIN — BUSPIRONE HYDROCHLORIDE 30 MG: 15 TABLET ORAL at 13:53

## 2017-01-12 RX ADMIN — Medication 1.5 MG: at 18:07

## 2017-01-12 RX ADMIN — OXYCODONE HYDROCHLORIDE 5 MG: 5 TABLET ORAL at 08:05

## 2017-01-12 RX ADMIN — DOBUTAMINE 7.5 MCG/KG/MIN: 12.5 INJECTION, SOLUTION, CONCENTRATE INTRAVENOUS at 05:29

## 2017-01-12 RX ADMIN — POLYETHYLENE GLYCOL 3350 17 G: 17 POWDER, FOR SOLUTION ORAL at 08:09

## 2017-01-12 RX ADMIN — POTASSIUM CHLORIDE 20 MEQ: 1500 TABLET, EXTENDED RELEASE ORAL at 08:11

## 2017-01-12 RX ADMIN — OXYCODONE HYDROCHLORIDE 5 MG: 5 TABLET ORAL at 19:42

## 2017-01-12 RX ADMIN — Medication 12.5 MG: at 08:09

## 2017-01-12 RX ADMIN — ONDANSETRON 4 MG: 2 INJECTION INTRAMUSCULAR; INTRAVENOUS at 22:34

## 2017-01-12 RX ADMIN — IRON 325 MG: 65 TABLET ORAL at 08:08

## 2017-01-12 RX ADMIN — HYDROXYZINE HYDROCHLORIDE 50 MG: 50 TABLET, FILM COATED ORAL at 19:35

## 2017-01-12 RX ADMIN — OXYCODONE HYDROCHLORIDE 5 MG: 5 TABLET ORAL at 03:43

## 2017-01-12 RX ADMIN — HYDROXYZINE HYDROCHLORIDE 50 MG: 50 TABLET, FILM COATED ORAL at 08:09

## 2017-01-12 RX ADMIN — HYDROMORPHONE HYDROCHLORIDE 0.5 MG: 10 INJECTION, SOLUTION INTRAMUSCULAR; INTRAVENOUS; SUBCUTANEOUS at 17:09

## 2017-01-12 RX ADMIN — HYDROXYZINE HYDROCHLORIDE 50 MG: 50 TABLET, FILM COATED ORAL at 13:52

## 2017-01-12 RX ADMIN — ONDANSETRON 4 MG: 2 INJECTION INTRAMUSCULAR; INTRAVENOUS at 17:09

## 2017-01-12 RX ADMIN — BUSPIRONE HYDROCHLORIDE 30 MG: 15 TABLET ORAL at 19:35

## 2017-01-12 RX ADMIN — BUMETANIDE 3 MG: 1 TABLET ORAL at 13:53

## 2017-01-12 RX ADMIN — ERTAPENEM SODIUM 1 G: 1 INJECTION, POWDER, LYOPHILIZED, FOR SOLUTION INTRAMUSCULAR; INTRAVENOUS at 11:05

## 2017-01-12 RX ADMIN — BUSPIRONE HYDROCHLORIDE 30 MG: 15 TABLET ORAL at 08:07

## 2017-01-12 RX ADMIN — BUMETANIDE 3 MG: 1 TABLET ORAL at 11:05

## 2017-01-12 RX ADMIN — ACETAMINOPHEN 650 MG: 325 TABLET, FILM COATED ORAL at 03:43

## 2017-01-12 ASSESSMENT — PAIN DESCRIPTION - DESCRIPTORS: DESCRIPTORS: SORE

## 2017-01-12 NOTE — PROGRESS NOTES
"[facility] [place]  Cardiology II Service  Daily Note  January 12, 2017      Interval History:   Nursing notes reviewed. Pain controlled with current regimen, denies nausea. No fever, chills.     Pertinent Medications:  bumex  Warfarin  Spironolactone  Dobutamine  digoxin    Examination:  /77 mmHg  Pulse 82  Temp(Src) 98.2  F (36.8  C) (Oral)  Resp 18  Ht 1.6 m (5' 3\")  Wt 70.897 kg (156 lb 4.8 oz)  BMI 27.69 kg/m2  SpO2 98%  GEN: awake, alert, resting comfortably in bed,  cooperative and conversational    HEENT: no scleral icterus, mucus membranes moist  NECK: Supple, JVD at mid neck at 30 degree angle, negative HJR.    RESP: CTA bilaterally, no wheezing or crackles    CV: Regular, normal rate, normal S1 and S2 without m/r/g    ABD: Soft, tender in RUQ, epigastrium and RLQ, no rigidity or peritoneal signs  EXT: No peripheral edema, warm/well perfused   NEURO: AAO * 3, no gross focal deficits    Data:  CMP  Recent Labs  Lab 01/12/17  0350 01/11/17  1739 01/11/17  0641 01/10/17  1747 01/10/17  0700 01/09/17  2059 01/09/17  0739    131* 133 132* 132* 131* 138   POTASSIUM 3.5 3.8 4.5 4.0 4.4 4.2 4.0   CHLORIDE 90* 91* 95 96 96 96 99   CO2 32 27 28 24 27 25 28   ANIONGAP 11 13 10 13 9 11 10   * 69* 78 102* 92 101* 80   BUN 22 31* 35* 34* 30 29 27   CR 0.65 1.34* 1.56* 1.59* 1.64* 1.68* 1.51*   GFRESTIMATED >90Non  GFR Calc 48* 41* 40* 38* 37* 42*   GFRESTBLACK >90African American GFR Calc 59* 49* 48* 46* 45* 51*   MARIO 8.0* 8.4* 8.9 8.5 9.2 8.9 8.3*   MAG  --   --  2.1  --  2.6* 2.6* 2.0   PROTTOTAL 6.3* 6.7* 7.0 7.2 7.4  --  6.5*   ALBUMIN 3.4 3.7 3.7 3.7 3.8  --  3.4   BILITOTAL 1.5* 1.0 1.0 0.8 0.9  --  0.8   ALKPHOS 68 71 72 82 79  --  65   * 188* 236* 292* 318*  --  265*   * 511* 577* 614* 602*  --  487*     CBC  Recent Labs  Lab 01/12/17  0350 01/10/17  0020 01/06/17  1422   WBC 4.4 6.2 7.3   RBC 3.75* 4.12 4.09   HGB 10.9* 12.0 " 11.8   HCT 35.2 38.7 37.8   MCV 94 94 92   MCH 29.1 29.1 28.9   MCHC 31.0* 31.0* 31.2*   RDW 15.6* 16.1* 15.9*    204 245     INR  Recent Labs  Lab 01/12/17  0350 01/11/17  1739 01/10/17  0700 01/08/17  1248   INR 2.94* 2.67* 3.37* 3.93*     Arterial Blood Gas  Recent Labs  Lab 01/06/17  1404   PH 7.46*   PCO2 34*   PO2 90   HCO3 24   O2PER 21           Assessment and Plan  26 year old female with PMHx significant for NICM secondary to polysubstance abuse (methamphetamine, marijuana, tobacco; initially diagnosed 7/2016 upon transfer from OSH in severe cardiogenic shock, bilateral PEs, and shock liver), s/p ICD (10/12/16), 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    Updates:  - Liver enzymes continue to trend down  - kidney function has normalized, bedside echo yesterday showed collapsing IVC w/ > 50% respiratory variation. She is euvolumic and at dry weight  - started diet today, advancing as tolerated    # Acute on chronic systolic heart failure with EF 20-25% s/p ICD / NICM secondary to polysubstance abuse: now euvolumic.   Dry weight is 156 lbs.    NYHA Class IIIb, Stage D. Presented with HF exacerbation, diuresed. Now on dobutamine 7.5mcg/kg/min and bumex   8th admission for heart failure since being diagnosed in July 2016. Anne is unable to be considered for advanced heart failure therapies, including LVAD and heart transplant, until she is sober for 1 year. Most recent known substance use was marijuana in October 2016.    - Continue dobutamine at 7.5 mcg/kg/min (increased on 1/7/17)  - oral bumex 3mg tid  - Patient and family wondering about second opinion to get LVAD sooner.    She will see Dr. Baeza in Clinic after discharge  - Daily weights, strict I&Os  - Previously on lisinopril, but stopped when dobutamine was started  - Beta blocker deferred due to low cardiac output  -  continue spironolactone at 12.5 mg daily (home dose 25mg qday)  - Continue digoxin 125mcg qday      # Non-infectious aortic valve vegetation (EVAN 2016)    # Gallstones, suspected cholecystitis: US and CT showing GB wall thickening and inflammation w/ cholelithiasis  No CBD dilation  - resumed liquid diet today after discussion with Surgery team, ADAT  - oxycodone prn for pain  - zofran prn for nausea  - continue ertapenem  - General surgery consulted for lap cholecystectomy vs percutaneous drainage. Given that HIDA scan was negative for cholecystitis (although reduced EF), surgery team recommends holding off on any procedures given high operative risk and questionable improvement in symptoms with the surgery    # Elevated trans-aminases/ improvin/2 gallstones  - continue to trend bid    # ROBERT on CKD/ resolved: Cr is 0.65 today, this seems to be the baseline   ROBERT was likely cardiorenal and from poor renal perfusion from low output state. Improved w/ diuresis and dobutamine, now at baseline  - strict I/O, daily weights  - f/u Cr and lytes  - avoid nephrotoxins    # Bilateral PE    - Warfarin per pharmacy  - Daily INR    # Chronic normocytic anemia  Last iron studies suggest anemia of chronic disease, likely from cardiomyopathy. Hgb at baseline of ~ 11.0  - Continue iron supplements    # Anxiety/Depression:     Given social circumstances surround her current medical status, was visibly upset multiple times during previous admission. Psychiatry saw her on  and recommended to increase cymbalta and for her to be referred back to her established psychiatrist at discharge with recommendations for follow up with psychotherapist.    - Continue buspar, cymbalta, atarax  - NO benzos    ** Update:  She had an episode of pre-syncope this afternoon, her orthostatic vitals were negative. Unclear etiology. We gave 500ml fluids, her symptoms improved. We held the evening bumex dose. About an hour later, she complained of  chest pressure on right side- tender on palpation at 2-4 costo-chondral joints. Labs were obtained, troponin mildly elevated- will trend. EKG and CXR were unremarkable.     FEN: CLD, ADAT  Prophylaxis: on warfarin for PE in 7/2016  Code Status: full  Disposition: likely discharge after resolution of abdominal pain    Plan of care discussed with Dr. Aroldo Mixon MD  PGY3 Internal Medicine  [phone]      I personally saw and examined this patient, reviewed imaging and laboratory studies, confirmed physical examination and discussed results and plan with patient and or family.

## 2017-01-12 NOTE — PLAN OF CARE
Problem: Goal Outcome Summary  Goal: Goal Outcome Summary  1. Pt will be hemodynamically stable  2. Pt s pain will be managed within stated goal    Pt admitted with fluid overload, abdominal pain, and nausea, hx of NICM secondary to polysubstance abuse. Vital signs stable, afebrile, A&Ox4, sinus rhythm. Abdominal pain well controlled with PRN oxycodone, tylenol and hot packs. Dobutamine gtt continues at 7.5 mcg/kg/min (8.1 ml/hr). Up independently. Voiding well. NPO. LFTs continue to improve. PCU collect. Mother at bedside and supportive. Plan is for possible second opinion for LVAD placement. Continue to monitor and notify MD with pertinent changes.

## 2017-01-12 NOTE — PLAN OF CARE
Problem: Goal Outcome Summary  Goal: Goal Outcome Summary  1. Pt will be hemodynamically stable  2. Pt s pain will be managed within stated goal  Outcome: No change    Patient admitted with fluid up, nausea and severe abdominal pain, syncopal episode.  Patient is alert and oriented x 4.  VSS on room air.  Sinus rhythm.  No complaints of nausea.  Continues to have abdominal pain, pain controlled with PRN oxycodone x 2, PRN tylenol x 1 and heat packs.  Voiding good amounts.  NPO, BG with evening labs 69, recheck 75- juice given, pt stated she was given some food by MD, sips allowed- team aware of BG.  Dobutamine gtt continues at 7.5 mcg/kg/min.  Up independently in hallways, supportive mom at bedside.  K+ replaced per protocol.  Continue with plan of care and notify team with changes/concerns.

## 2017-01-12 NOTE — PLAN OF CARE
Problem: Goal Outcome Summary  Goal: Goal Outcome Summary  1. Pt will be hemodynamically stable  2. Pt s pain will be managed within stated goal  Outcome: Improving  Patient admitted fluid up with nausea and severe abdominal pain-syncopal episode. She is alert and oriented this morning x4. VSS on room air. Sinus rhythm. No complaints of nausea. Complains of pain in the R middle area of her abdomen-given PRN oxycodone x2 with temporary relief. Dobutamine gtt at 7.5 mcg/kg/min. Also received antibiotic as ordered. Up independently in hallways, supportive mom at bedside. Restarted food today had CLD for breakfast and soup and a sandwich for lunch. Seemed to tolerate it with no new complaints of pain.   A; Pt stable abdominal discomfort appears to be getting better, general condition improving   P: Continue to monitor and notify Cards 2 team with changes/concerns.

## 2017-01-13 ENCOUNTER — APPOINTMENT (OUTPATIENT)
Dept: CARDIOLOGY | Facility: CLINIC | Age: 27
DRG: 286 | End: 2017-01-13
Payer: COMMERCIAL

## 2017-01-13 LAB
ALBUMIN SERPL-MCNC: 3.4 G/DL (ref 3.4–5)
ALBUMIN SERPL-MCNC: 3.6 G/DL (ref 3.4–5)
ALBUMIN SERPL-MCNC: 3.8 G/DL (ref 3.4–5)
ALP SERPL-CCNC: 65 U/L (ref 40–150)
ALP SERPL-CCNC: 70 U/L (ref 40–150)
ALP SERPL-CCNC: 74 U/L (ref 40–150)
ALT SERPL W P-5'-P-CCNC: 459 U/L (ref 0–50)
ALT SERPL W P-5'-P-CCNC: 531 U/L (ref 0–50)
ALT SERPL W P-5'-P-CCNC: 574 U/L (ref 0–50)
ANION GAP SERPL CALCULATED.3IONS-SCNC: 8 MMOL/L (ref 3–14)
ANION GAP SERPL CALCULATED.3IONS-SCNC: 8 MMOL/L (ref 3–14)
ANION GAP SERPL CALCULATED.3IONS-SCNC: 9 MMOL/L (ref 3–14)
AST SERPL W P-5'-P-CCNC: 194 U/L (ref 0–45)
AST SERPL W P-5'-P-CCNC: 335 U/L (ref 0–45)
AST SERPL W P-5'-P-CCNC: 351 U/L (ref 0–45)
BASE EXCESS BLDV CALC-SCNC: 6.3 MMOL/L
BASE EXCESS BLDV CALC-SCNC: 6.4 MMOL/L
BILIRUB SERPL-MCNC: 0.8 MG/DL (ref 0.2–1.3)
BILIRUB SERPL-MCNC: 1.1 MG/DL (ref 0.2–1.3)
BILIRUB SERPL-MCNC: 1.3 MG/DL (ref 0.2–1.3)
BUN SERPL-MCNC: 25 MG/DL (ref 7–30)
BUN SERPL-MCNC: 25 MG/DL (ref 7–30)
BUN SERPL-MCNC: 26 MG/DL (ref 7–30)
CALCIUM SERPL-MCNC: 8.2 MG/DL (ref 8.5–10.1)
CALCIUM SERPL-MCNC: 8.9 MG/DL (ref 8.5–10.1)
CALCIUM SERPL-MCNC: 9 MG/DL (ref 8.5–10.1)
CHLORIDE SERPL-SCNC: 91 MMOL/L (ref 94–109)
CHLORIDE SERPL-SCNC: 93 MMOL/L (ref 94–109)
CHLORIDE SERPL-SCNC: 94 MMOL/L (ref 94–109)
CO2 SERPL-SCNC: 29 MMOL/L (ref 20–32)
CO2 SERPL-SCNC: 29 MMOL/L (ref 20–32)
CO2 SERPL-SCNC: 30 MMOL/L (ref 20–32)
CREAT SERPL-MCNC: 1.34 MG/DL (ref 0.52–1.04)
CREAT SERPL-MCNC: 1.34 MG/DL (ref 0.52–1.04)
CREAT SERPL-MCNC: 1.42 MG/DL (ref 0.52–1.04)
DIGOXIN SERPL-MCNC: 1.5 UG/L (ref 0.5–2)
ERYTHROCYTE [DISTWIDTH] IN BLOOD BY AUTOMATED COUNT: 15.3 % (ref 10–15)
ERYTHROCYTE [DISTWIDTH] IN BLOOD BY AUTOMATED COUNT: 15.6 % (ref 10–15)
GFR SERPL CREATININE-BSD FRML MDRD: 45 ML/MIN/1.7M2
GFR SERPL CREATININE-BSD FRML MDRD: 48 ML/MIN/1.7M2
GFR SERPL CREATININE-BSD FRML MDRD: 48 ML/MIN/1.7M2
GLUCOSE SERPL-MCNC: 105 MG/DL (ref 70–99)
GLUCOSE SERPL-MCNC: 88 MG/DL (ref 70–99)
GLUCOSE SERPL-MCNC: 99 MG/DL (ref 70–99)
HCO3 BLDV-SCNC: 32 MMOL/L (ref 21–28)
HCO3 BLDV-SCNC: 32 MMOL/L (ref 21–28)
HCT VFR BLD AUTO: 36 % (ref 35–47)
HCT VFR BLD AUTO: 36.6 % (ref 35–47)
HGB BLD-MCNC: 11.5 G/DL (ref 11.7–15.7)
HGB BLD-MCNC: 11.5 G/DL (ref 11.7–15.7)
INR PPP: 3.58 (ref 0.86–1.14)
INTERPRETATION ECG - MUSE: NORMAL
MAGNESIUM SERPL-MCNC: 1.9 MG/DL (ref 1.6–2.3)
MCH RBC QN AUTO: 28.8 PG (ref 26.5–33)
MCH RBC QN AUTO: 29.1 PG (ref 26.5–33)
MCHC RBC AUTO-ENTMCNC: 31.4 G/DL (ref 31.5–36.5)
MCHC RBC AUTO-ENTMCNC: 31.9 G/DL (ref 31.5–36.5)
MCV RBC AUTO: 91 FL (ref 78–100)
MCV RBC AUTO: 92 FL (ref 78–100)
O2/TOTAL GAS SETTING VFR VENT: 21 %
O2/TOTAL GAS SETTING VFR VENT: 21 %
OXYHGB MFR BLDV: 31 %
OXYHGB MFR BLDV: 52 %
PCO2 BLDV: 50 MM HG (ref 40–50)
PCO2 BLDV: 53 MM HG (ref 40–50)
PH BLDV: 7.39 PH (ref 7.32–7.43)
PH BLDV: 7.41 PH (ref 7.32–7.43)
PHOSPHATE SERPL-MCNC: 3.1 MG/DL (ref 2.5–4.5)
PLATELET # BLD AUTO: 141 10E9/L (ref 150–450)
PLATELET # BLD AUTO: 148 10E9/L (ref 150–450)
PO2 BLDV: 25 MM HG (ref 25–47)
PO2 BLDV: 34 MM HG (ref 25–47)
POTASSIUM SERPL-SCNC: 4.2 MMOL/L (ref 3.4–5.3)
POTASSIUM SERPL-SCNC: 4.4 MMOL/L (ref 3.4–5.3)
POTASSIUM SERPL-SCNC: 4.8 MMOL/L (ref 3.4–5.3)
PROT SERPL-MCNC: 6.5 G/DL (ref 6.8–8.8)
PROT SERPL-MCNC: 6.8 G/DL (ref 6.8–8.8)
PROT SERPL-MCNC: 7.1 G/DL (ref 6.8–8.8)
RBC # BLD AUTO: 3.95 10E12/L (ref 3.8–5.2)
RBC # BLD AUTO: 4 10E12/L (ref 3.8–5.2)
SODIUM SERPL-SCNC: 129 MMOL/L (ref 133–144)
SODIUM SERPL-SCNC: 130 MMOL/L (ref 133–144)
SODIUM SERPL-SCNC: 132 MMOL/L (ref 133–144)
TROPONIN I SERPL-MCNC: 0.06 UG/L (ref 0–0.04)
WBC # BLD AUTO: 3.9 10E9/L (ref 4–11)
WBC # BLD AUTO: 5.4 10E9/L (ref 4–11)

## 2017-01-13 PROCEDURE — 85027 COMPLETE CBC AUTOMATED: CPT | Performed by: INTERNAL MEDICINE

## 2017-01-13 PROCEDURE — 27210982 ZZH KIT RT HC TOTES DISP CR7

## 2017-01-13 PROCEDURE — 99292 CRITICAL CARE ADDL 30 MIN: CPT | Mod: GC | Performed by: INTERNAL MEDICINE

## 2017-01-13 PROCEDURE — C1894 INTRO/SHEATH, NON-LASER: HCPCS

## 2017-01-13 PROCEDURE — 40000275 ZZH STATISTIC RCP TIME EA 10 MIN

## 2017-01-13 PROCEDURE — 25000125 ZZHC RX 250: Performed by: INTERNAL MEDICINE

## 2017-01-13 PROCEDURE — 84484 ASSAY OF TROPONIN QUANT: CPT | Performed by: INTERNAL MEDICINE

## 2017-01-13 PROCEDURE — 20000004 ZZH R&B ICU UMMC

## 2017-01-13 PROCEDURE — 40000196 ZZH STATISTIC RAPCV CVP MONITORING

## 2017-01-13 PROCEDURE — 25000125 ZZHC RX 250

## 2017-01-13 PROCEDURE — 40000048 ZZH STATISTIC DAILY SWAN MONITORING

## 2017-01-13 PROCEDURE — 80053 COMPREHEN METABOLIC PANEL: CPT | Performed by: INTERNAL MEDICINE

## 2017-01-13 PROCEDURE — 93451 RIGHT HEART CATH: CPT | Mod: 26 | Performed by: INTERNAL MEDICINE

## 2017-01-13 PROCEDURE — 82805 BLOOD GASES W/O2 SATURATION: CPT | Performed by: INTERNAL MEDICINE

## 2017-01-13 PROCEDURE — 80162 ASSAY OF DIGOXIN TOTAL: CPT | Performed by: INTERNAL MEDICINE

## 2017-01-13 PROCEDURE — 27210787 ZZH MANIFOLD CR2

## 2017-01-13 PROCEDURE — 25000132 ZZH RX MED GY IP 250 OP 250 PS 637: Performed by: INTERNAL MEDICINE

## 2017-01-13 PROCEDURE — 84100 ASSAY OF PHOSPHORUS: CPT | Performed by: INTERNAL MEDICINE

## 2017-01-13 PROCEDURE — 83735 ASSAY OF MAGNESIUM: CPT | Performed by: INTERNAL MEDICINE

## 2017-01-13 PROCEDURE — 99291 CRITICAL CARE FIRST HOUR: CPT | Mod: GC | Performed by: INTERNAL MEDICINE

## 2017-01-13 PROCEDURE — 85610 PROTHROMBIN TIME: CPT | Performed by: INTERNAL MEDICINE

## 2017-01-13 PROCEDURE — 4A023N6 MEASUREMENT OF CARDIAC SAMPLING AND PRESSURE, RIGHT HEART, PERCUTANEOUS APPROACH: ICD-10-PCS | Performed by: INTERNAL MEDICINE

## 2017-01-13 PROCEDURE — 93451 RIGHT HEART CATH: CPT

## 2017-01-13 PROCEDURE — 40000802 ZZH SITE CHECK

## 2017-01-13 PROCEDURE — S0171 BUMETANIDE 0.5 MG: HCPCS | Performed by: INTERNAL MEDICINE

## 2017-01-13 PROCEDURE — 27211181 ZZH BALLOON TIP PRESSURE CR5

## 2017-01-13 RX ORDER — NIFEDIPINE 10 MG/1
10 CAPSULE ORAL
Status: DISCONTINUED | OUTPATIENT
Start: 2017-01-13 | End: 2017-01-13 | Stop reason: HOSPADM

## 2017-01-13 RX ORDER — EPTIFIBATIDE 2 MG/ML
180 INJECTION, SOLUTION INTRAVENOUS EVERY 10 MIN PRN
Status: DISCONTINUED | OUTPATIENT
Start: 2017-01-13 | End: 2017-01-13 | Stop reason: HOSPADM

## 2017-01-13 RX ORDER — BUMETANIDE 0.25 MG/ML
3 INJECTION INTRAMUSCULAR; INTRAVENOUS EVERY 8 HOURS
Status: DISCONTINUED | OUTPATIENT
Start: 2017-01-13 | End: 2017-01-14

## 2017-01-13 RX ORDER — LIDOCAINE HYDROCHLORIDE 10 MG/ML
30 INJECTION, SOLUTION EPIDURAL; INFILTRATION; INTRACAUDAL; PERINEURAL
Status: DISCONTINUED | OUTPATIENT
Start: 2017-01-13 | End: 2017-01-13 | Stop reason: HOSPADM

## 2017-01-13 RX ORDER — SODIUM NITROPRUSSIDE 25 MG/ML
100-200 INJECTION INTRAVENOUS
Status: DISCONTINUED | OUTPATIENT
Start: 2017-01-13 | End: 2017-01-13 | Stop reason: HOSPADM

## 2017-01-13 RX ORDER — NITROGLYCERIN 20 MG/100ML
.07-2 INJECTION INTRAVENOUS CONTINUOUS PRN
Status: DISCONTINUED | OUTPATIENT
Start: 2017-01-13 | End: 2017-01-13 | Stop reason: HOSPADM

## 2017-01-13 RX ORDER — NITROGLYCERIN 5 MG/ML
100-500 VIAL (ML) INTRAVENOUS
Status: DISCONTINUED | OUTPATIENT
Start: 2017-01-13 | End: 2017-01-13 | Stop reason: HOSPADM

## 2017-01-13 RX ORDER — ADENOSINE 3 MG/ML
12-12000 INJECTION, SOLUTION INTRAVENOUS
Status: DISCONTINUED | OUTPATIENT
Start: 2017-01-13 | End: 2017-01-13 | Stop reason: HOSPADM

## 2017-01-13 RX ORDER — PROTAMINE SULFATE 10 MG/ML
25-100 INJECTION, SOLUTION INTRAVENOUS EVERY 5 MIN PRN
Status: DISCONTINUED | OUTPATIENT
Start: 2017-01-13 | End: 2017-01-13 | Stop reason: HOSPADM

## 2017-01-13 RX ORDER — FUROSEMIDE 10 MG/ML
20-100 INJECTION INTRAMUSCULAR; INTRAVENOUS
Status: DISCONTINUED | OUTPATIENT
Start: 2017-01-13 | End: 2017-01-13 | Stop reason: HOSPADM

## 2017-01-13 RX ORDER — ONDANSETRON 2 MG/ML
4 INJECTION INTRAMUSCULAR; INTRAVENOUS EVERY 4 HOURS PRN
Status: DISCONTINUED | OUTPATIENT
Start: 2017-01-13 | End: 2017-01-13 | Stop reason: HOSPADM

## 2017-01-13 RX ORDER — NITROGLYCERIN 0.4 MG/1
0.4 TABLET SUBLINGUAL EVERY 5 MIN PRN
Status: DISCONTINUED | OUTPATIENT
Start: 2017-01-13 | End: 2017-01-13 | Stop reason: HOSPADM

## 2017-01-13 RX ORDER — DEXTROSE MONOHYDRATE 25 G/50ML
12.5-5 INJECTION, SOLUTION INTRAVENOUS EVERY 30 MIN PRN
Status: DISCONTINUED | OUTPATIENT
Start: 2017-01-13 | End: 2017-01-13 | Stop reason: HOSPADM

## 2017-01-13 RX ORDER — EPTIFIBATIDE 2 MG/ML
2 INJECTION, SOLUTION INTRAVENOUS CONTINUOUS PRN
Status: DISCONTINUED | OUTPATIENT
Start: 2017-01-13 | End: 2017-01-13 | Stop reason: HOSPADM

## 2017-01-13 RX ORDER — POTASSIUM CHLORIDE 29.8 MG/ML
20 INJECTION INTRAVENOUS
Status: DISCONTINUED | OUTPATIENT
Start: 2017-01-13 | End: 2017-01-13 | Stop reason: HOSPADM

## 2017-01-13 RX ORDER — LORAZEPAM 2 MG/ML
.5-2 INJECTION INTRAMUSCULAR EVERY 4 HOURS PRN
Status: DISCONTINUED | OUTPATIENT
Start: 2017-01-13 | End: 2017-01-13 | Stop reason: HOSPADM

## 2017-01-13 RX ORDER — DOPAMINE HYDROCHLORIDE 160 MG/100ML
2-20 INJECTION, SOLUTION INTRAVENOUS CONTINUOUS PRN
Status: DISCONTINUED | OUTPATIENT
Start: 2017-01-13 | End: 2017-01-13 | Stop reason: HOSPADM

## 2017-01-13 RX ORDER — ASPIRIN 325 MG
325 TABLET ORAL
Status: DISCONTINUED | OUTPATIENT
Start: 2017-01-13 | End: 2017-01-13 | Stop reason: HOSPADM

## 2017-01-13 RX ORDER — DIPHENHYDRAMINE HYDROCHLORIDE 50 MG/ML
25-50 INJECTION INTRAMUSCULAR; INTRAVENOUS
Status: DISCONTINUED | OUTPATIENT
Start: 2017-01-13 | End: 2017-01-13 | Stop reason: HOSPADM

## 2017-01-13 RX ORDER — VERAPAMIL HYDROCHLORIDE 2.5 MG/ML
1-2.5 INJECTION, SOLUTION INTRAVENOUS
Status: DISCONTINUED | OUTPATIENT
Start: 2017-01-13 | End: 2017-01-13 | Stop reason: HOSPADM

## 2017-01-13 RX ORDER — NALOXONE HYDROCHLORIDE 0.4 MG/ML
0.4 INJECTION, SOLUTION INTRAMUSCULAR; INTRAVENOUS; SUBCUTANEOUS EVERY 5 MIN PRN
Status: DISCONTINUED | OUTPATIENT
Start: 2017-01-13 | End: 2017-01-13 | Stop reason: HOSPADM

## 2017-01-13 RX ORDER — ASPIRIN 81 MG/1
81-324 TABLET, CHEWABLE ORAL
Status: DISCONTINUED | OUTPATIENT
Start: 2017-01-13 | End: 2017-01-13 | Stop reason: HOSPADM

## 2017-01-13 RX ORDER — NICARDIPINE HYDROCHLORIDE 2.5 MG/ML
100 INJECTION INTRAVENOUS
Status: DISCONTINUED | OUTPATIENT
Start: 2017-01-13 | End: 2017-01-13 | Stop reason: HOSPADM

## 2017-01-13 RX ORDER — ENALAPRILAT 1.25 MG/ML
1.25-2.5 INJECTION INTRAVENOUS
Status: DISCONTINUED | OUTPATIENT
Start: 2017-01-13 | End: 2017-01-13 | Stop reason: HOSPADM

## 2017-01-13 RX ORDER — PRASUGREL 10 MG/1
10-60 TABLET, FILM COATED ORAL
Status: DISCONTINUED | OUTPATIENT
Start: 2017-01-13 | End: 2017-01-13 | Stop reason: HOSPADM

## 2017-01-13 RX ORDER — FENTANYL CITRATE 50 UG/ML
25-50 INJECTION, SOLUTION INTRAMUSCULAR; INTRAVENOUS
Status: DISCONTINUED | OUTPATIENT
Start: 2017-01-13 | End: 2017-01-13 | Stop reason: HOSPADM

## 2017-01-13 RX ORDER — METHYLPREDNISOLONE SODIUM SUCCINATE 125 MG/2ML
125 INJECTION, POWDER, LYOPHILIZED, FOR SOLUTION INTRAMUSCULAR; INTRAVENOUS
Status: DISCONTINUED | OUTPATIENT
Start: 2017-01-13 | End: 2017-01-13 | Stop reason: HOSPADM

## 2017-01-13 RX ORDER — CLOPIDOGREL BISULFATE 75 MG/1
75 TABLET ORAL
Status: DISCONTINUED | OUTPATIENT
Start: 2017-01-13 | End: 2017-01-13 | Stop reason: HOSPADM

## 2017-01-13 RX ORDER — DOBUTAMINE HYDROCHLORIDE 200 MG/100ML
2-20 INJECTION INTRAVENOUS CONTINUOUS PRN
Status: DISCONTINUED | OUTPATIENT
Start: 2017-01-13 | End: 2017-01-13 | Stop reason: HOSPADM

## 2017-01-13 RX ORDER — HYDRALAZINE HYDROCHLORIDE 20 MG/ML
10-20 INJECTION INTRAMUSCULAR; INTRAVENOUS
Status: DISCONTINUED | OUTPATIENT
Start: 2017-01-13 | End: 2017-01-13 | Stop reason: HOSPADM

## 2017-01-13 RX ORDER — PROMETHAZINE HYDROCHLORIDE 25 MG/ML
6.25-25 INJECTION, SOLUTION INTRAMUSCULAR; INTRAVENOUS EVERY 4 HOURS PRN
Status: DISCONTINUED | OUTPATIENT
Start: 2017-01-13 | End: 2017-01-13 | Stop reason: HOSPADM

## 2017-01-13 RX ORDER — FLUMAZENIL 0.1 MG/ML
0.2 INJECTION, SOLUTION INTRAVENOUS
Status: DISCONTINUED | OUTPATIENT
Start: 2017-01-13 | End: 2017-01-13 | Stop reason: HOSPADM

## 2017-01-13 RX ORDER — PHENYLEPHRINE HCL IN 0.9% NACL 1 MG/10 ML
20-100 SYRINGE (ML) INTRAVENOUS
Status: DISCONTINUED | OUTPATIENT
Start: 2017-01-13 | End: 2017-01-13 | Stop reason: HOSPADM

## 2017-01-13 RX ORDER — PROTAMINE SULFATE 10 MG/ML
1-5 INJECTION, SOLUTION INTRAVENOUS
Status: DISCONTINUED | OUTPATIENT
Start: 2017-01-13 | End: 2017-01-13 | Stop reason: HOSPADM

## 2017-01-13 RX ORDER — POTASSIUM CHLORIDE 7.45 MG/ML
10 INJECTION INTRAVENOUS
Status: DISCONTINUED | OUTPATIENT
Start: 2017-01-13 | End: 2017-01-13 | Stop reason: HOSPADM

## 2017-01-13 RX ORDER — CLOPIDOGREL BISULFATE 75 MG/1
300-600 TABLET ORAL
Status: DISCONTINUED | OUTPATIENT
Start: 2017-01-13 | End: 2017-01-13 | Stop reason: HOSPADM

## 2017-01-13 RX ORDER — NITROGLYCERIN 5 MG/ML
100-200 VIAL (ML) INTRAVENOUS
Status: DISCONTINUED | OUTPATIENT
Start: 2017-01-13 | End: 2017-01-13 | Stop reason: HOSPADM

## 2017-01-13 RX ADMIN — HYDROXYZINE HYDROCHLORIDE 50 MG: 50 TABLET, FILM COATED ORAL at 08:40

## 2017-01-13 RX ADMIN — DULOXETINE 90 MG: 30 CAPSULE, DELAYED RELEASE ORAL at 08:38

## 2017-01-13 RX ADMIN — DIGOXIN 125 MCG: 125 TABLET ORAL at 08:38

## 2017-01-13 RX ADMIN — IRON 325 MG: 65 TABLET ORAL at 19:51

## 2017-01-13 RX ADMIN — ACETAMINOPHEN 650 MG: 325 TABLET, FILM COATED ORAL at 03:40

## 2017-01-13 RX ADMIN — SODIUM NITROPRUSSIDE 0.5 MCG/KG/MIN: 25 INJECTION, SOLUTION, CONCENTRATE INTRAVENOUS at 19:02

## 2017-01-13 RX ADMIN — BUSPIRONE HYDROCHLORIDE 30 MG: 15 TABLET ORAL at 08:39

## 2017-01-13 RX ADMIN — PANTOPRAZOLE SODIUM 40 MG: 40 TABLET, DELAYED RELEASE ORAL at 08:39

## 2017-01-13 RX ADMIN — OXYCODONE HYDROCHLORIDE 5 MG: 5 TABLET ORAL at 03:40

## 2017-01-13 RX ADMIN — BUMETANIDE 3 MG: 0.25 INJECTION INTRAMUSCULAR; INTRAVENOUS at 23:36

## 2017-01-13 RX ADMIN — POLYETHYLENE GLYCOL 3350 17 G: 17 POWDER, FOR SOLUTION ORAL at 08:37

## 2017-01-13 RX ADMIN — BUSPIRONE HYDROCHLORIDE 30 MG: 15 TABLET ORAL at 14:07

## 2017-01-13 RX ADMIN — HYDROXYZINE HYDROCHLORIDE 50 MG: 50 TABLET, FILM COATED ORAL at 14:07

## 2017-01-13 RX ADMIN — ERTAPENEM SODIUM 1 G: 1 INJECTION, POWDER, LYOPHILIZED, FOR SOLUTION INTRAMUSCULAR; INTRAVENOUS at 10:51

## 2017-01-13 RX ADMIN — Medication 12.5 MG: at 08:40

## 2017-01-13 RX ADMIN — ACETAMINOPHEN 650 MG: 325 TABLET, FILM COATED ORAL at 07:46

## 2017-01-13 RX ADMIN — BUSPIRONE HYDROCHLORIDE 30 MG: 15 TABLET ORAL at 19:51

## 2017-01-13 RX ADMIN — HYDROXYZINE HYDROCHLORIDE 50 MG: 50 TABLET, FILM COATED ORAL at 19:51

## 2017-01-13 RX ADMIN — IRON 325 MG: 65 TABLET ORAL at 08:39

## 2017-01-13 RX ADMIN — OXYCODONE HYDROCHLORIDE 5 MG: 5 TABLET ORAL at 07:46

## 2017-01-13 RX ADMIN — DOBUTAMINE 6 MCG/KG/MIN: 12.5 INJECTION, SOLUTION, CONCENTRATE INTRAVENOUS at 18:35

## 2017-01-13 NOTE — PLAN OF CARE
Problem: Goal Outcome Summary  Goal: Goal Outcome Summary  1. Pt will be hemodynamically stable  2. Pt s pain will be managed within stated goal    D/I: Patient's vital signs have been stable. Rhythm was SR/ST  with a rare PVC, and a rare ventricular escape beat. She had Oxycodone this morning but appeared lethargic around 11:00 and was diaphoretic after going to the bathroom. Her carotid pulse was pounding and she was pale. Cards 2 was notified. She ambulated in the nunez once today and complained of abdominal pressure. Dr Pierson stated he will discontinue the oxycodone. Patient will have a right heart cath and keep the swan sheryl catheter in. Then she will be transferred to unit 4E per cards 2 orders. She continues with a Dobutamine drip at 6 mcg/kg/min through her PICC line           .  A: See flow sheets for further assessment details  P: Continue to monitor vital signs, rhythm, and labs. Notify MD with any changes or concerns.

## 2017-01-13 NOTE — PLAN OF CARE
Problem: Goal Outcome Summary  Goal: Goal Outcome Summary  1. Pt will be hemodynamically stable  2. Pt s pain will be managed within stated goal    D/I: Patient left unit 6C at 15:15 to go to for a right heart cath. She will be going to unit 4E after. Report was given to the receiving RN and all her belongings will be taken to unit 4E.

## 2017-01-13 NOTE — PROCEDURES
PRELIMINARY CARDIAC CATH REPORT:     PROCEDURES PERFORMED:   Right Heart Catheterization    PHYSICIANS:  Attending Physician: Prashant Appiah MD  Cardiology Fellow: Christian Dykes MD    INDICATION:  Anne Eugene is a 26 year old female with a non-ischemic cardiomyopathy and severe heart failure with reduced EF (20-25%) who is referred on an inpatient basis for invasive hemodynamic evaluation and pulmonary arterial catheter placement.      DESCRIPTION:  1. Consent obtained with discussion of risks.  All questions were answered.  2. Sterile prep and procedure.  3. Location with Sheaths:   Rt IJ vein 7 Fr locking sheath  4. Access: Local anesthetic with lidocaine.  A standard 18 guage needle with ultrasound guidance was used to establish vascular access using a modified Seldinger technique.  5. Diagnostic Catheters:   7 Fr  [place]  6. Estimated blood loss: < 25 ml    MEDICATIONS:  Heart rate, BP, respiration, oxygen saturation and patient responses were monitored throughout the procedure with the assistance of the RN.  >> Local infiltration with 1% lidocaine without epinephrine    Procedures:   HEMODYNAMICS:  BSA 1.74  1. HR 84 bpm  2. /69/89 mmHg  3. RA 29/29/20  4. RV 54/18  5. PA 56/30/39   6. PCW 33/34/29   7. PA sat 30.9%   8. PCW sat 93.8%  9. Hgb 9.9 g/dL   10. Keely CO 2.3   11. Keely CI 1.3   12. TD CO 2.4   13. TD CI 1.4   14. PVR 1.3  15. SVR 2300     Fluoroscopy Time: 1.1 min    COMPLICATIONS:  1. None    SUMMARY:   >> High right-sided and High left-sided filling pressures.  >> Moderate pulmonary hypertension  >> Reduced cardiac output, 2.4 L/min with index 1.4 L/min/m2 by thermodilution     PLAN:   >> Continued medical management and lifestyle modification for cardiovascular risk factor optimization.   >>. Return to the primary inpatient team for further evaluation and management.    The attending interventional cardiologist was present and supervised all critical aspects the procedure.  Findings  were discussed with Dr. Monet, a fellow on the primary team because the staff was occupied in a family meeting.  See CVIS report for final draft.    Christian Dykes MD  Cardiovascular disease fellow

## 2017-01-13 NOTE — PROGRESS NOTES
"[facility]  Cardiology II Service  Daily Note  January 13, 2017      Interval History:   - Had a syncopal episode yesterday, no arrhythmia on tele, description seems consistent with a vagal etiology, nonetheless she was given  cc  - Overnight continued to feel fatigued, SOB  - LFTs worsening, likely from congestive hepatopathy    Pertinent Medications:  bumex  Warfarin  Spironolactone  Dobutamine  digoxin    Examination:  /79 mmHg  Pulse 81  Temp(Src) 97.5  F (36.4  C) (Oral)  Resp 18  Ht 1.6 m (5' 3\")  Wt 73.029 kg (161 lb)  BMI 28.53 kg/m2  SpO2 98%  GEN: awake, alert, resting comfortably in bed,  cooperative and conversational    HEENT: no scleral icterus, mucus membranes moist  NECK: Supple, JVDup to the angle of the mandible at 30 degree angle, negative HJR.    RESP: CTA bilaterally, no wheezing. +crackles at bases b/l  CV: Regular, normal rate, normal S1 and S2 without m/r/g    ABD: Soft, tender in RUQ, epigastrium and RLQ, no rigidity or peritoneal signs  EXT: No peripheral edema, warm/well perfused   NEURO: AAO * 3, no gross focal deficits    Data:  CMP  Recent Labs  Lab 01/13/17  0655 01/13/17 01/12/17  1655 01/12/17  0350  01/11/17  0641  01/10/17  0700 01/09/17  2059   * 129* 135 133  < > 133  < > 132* 131*   POTASSIUM 4.4 4.8 4.3 3.5  < > 4.5  < > 4.4 4.2   CHLORIDE 93* 91* 96 90*  < > 95  < > 96 96   CO2 29 29 29 32  < > 28  < > 27 25   ANIONGAP 8 9 10 11  < > 10  < > 9 11   GLC 88 105* 133* 191*  < > 78  < > 92 101*   BUN 26 25 25 22  < > 35*  < > 30 29   CR 1.34* 1.34* 1.40* 0.65  < > 1.56*  < > 1.64* 1.68*   GFRESTIMATED 48* 48* 46* >90Non  GFR Calc  < > 41*  < > 38* 37*   GFRESTBLACK 59* 59* 56* >90African American GFR Calc  < > 49*  < > 46* 45*   MARIO 9.0 8.9 8.0* 8.0*  < > 8.9  < > 9.2 8.9   MAG  --   --  2.0  --   --  2.1  --  2.6* 2.6*   PHOS  --   --  4.6*  --   --   --   --   --   --    PROTTOTAL 6.8 7.1 6.6* 6.3*  " < > 7.0  < > 7.4  --    ALBUMIN 3.6 3.8 3.3* 3.4  < > 3.7  < > 3.8  --    BILITOTAL 1.3 1.1 0.8 1.5*  < > 1.0  < > 0.9  --    ALKPHOS 70 74 70 68  < > 72  < > 79  --    * 194* 118* 143*  < > 236*  < > 318*  --    * 459* 391* 427*  < > 577*  < > 602*  --    < > = values in this interval not displayed.  CBC    Recent Labs  Lab 17  0655 17  1655 17  0350 01/10/17  0020   WBC 3.9* 4.9 4.4 6.2   RBC 4.00 3.90 3.75* 4.12   HGB 11.5* 11.4* 10.9* 12.0   HCT 36.6 37.0 35.2 38.7   MCV 92 95 94 94   MCH 28.8 29.2 29.1 29.1   MCHC 31.4* 30.8* 31.0* 31.0*   RDW 15.6* 15.8* 15.6* 16.1*   * 190 195 204     INR    Recent Labs  Lab 17  0655 17  0350 17  1739 01/10/17  0700   INR 3.58* 2.94* 2.67* 3.37*     Arterial Blood GasNo lab results found in last 7 days.        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    Updates:  - Decreasing Dig to 62.5 daily  - RHC performed shows RA pressure of 20, PCWP 29, Keely CI 1.3, TD CI 1.4, SVR ~2300 on  6  - Will leave the SGC in, transfer to ICU and start Nipride infusion, for now will hold off on diuresis given she may autodiureses once her afterload reduces and she is able to perfuse her kidneys, if her UOP does not  can consider PRN lasix as dictated by hemodynamics.    # Acute on chronic systolic heart failure with EF 20-25% s/p ICD / NICM secondary to polysubstance abuse: now euvolumic.   Dry weight is 156 lbs.    NYHA Class IIIb, Stage D. Presented with HF exacerbation, diuresed. Now on dobutamine 7.5mcg/kg/min and bumex   8th  admission for heart failure since being diagnosed in 2016. Anne is unable to be considered for advanced heart failure therapies, including LVAD and heart transplant, until she is sober for 1 year. Most recent known substance use was marijuana in 2016.    - Continue dobutamine at 6 mcg/kg/min  - Will add nipride as above  - Patient and family wondering about second opinion to get LVAD sooner.    She will see Dr. Baeza in Clinic after discharge  - Daily weights, strict I&Os  - Previously on lisinopril, but stopped when dobutamine was started  - Beta blocker deferred due to low cardiac output  - continue spironolactone at 12.5 mg daily (home dose 25mg qday)  - Lowered digoxin to 62.5mcg qday  (given level at 1.5 on )    # Non-infectious aortic valve vegetation (EVAN 2016)    # Gallstones, suspected cholecystitis: US and CT showing GB wall thickening and inflammation w/ cholelithiasis  No CBD dilation  - resumed liquid diet after discussion with Surgery team, ADAT  - NO oxycodone for pain, given hx of addiction  - zofran prn for nausea  - continue ertapenem  - General surgery consulted for lap cholecystectomy vs percutaneous drainage. Given that HIDA scan was negative for cholecystitis (although reduced EF), surgery team recommends holding off on any procedures given high operative risk and questionable improvement in symptoms with the surgery    # Elevated trans-aminases/ improvin/2 gallstones and congestive hepatopathy  - continue to trend bid    # ROBERT on CKD/ resolved: Cr is 0.65 today, this seems to be the baseline  - strict I/O, daily weights  - f/u Cr and lytes  - avoid nephrotoxins    # Bilateral PE    - Warfarin per pharmacy  - Daily INR    # Chronic normocytic anemia  Last iron studies suggest anemia of chronic disease, likely from cardiomyopathy. Hgb at baseline of ~ 11.0  - Continue iron supplements    # Anxiety/Depression:     Given social circumstances surround her current medical  status, was visibly upset multiple times during previous admission. Psychiatry saw her on 12/14 and recommended to increase cymbalta and for her to be referred back to her established psychiatrist at discharge with recommendations for follow up with psychotherapist.    - Continue buspar, cymbalta, atarax  - NO benzos      FEN: CLD, ADAT  Prophylaxis: on warfarin for PE in 7/2016  Code Status: full  Disposition: likely discharge after resolution of abdominal pain    Plan of care discussed with Dr. Aroldo Mixon MD  PGY3 Internal Medicine  [phone]        Critical Care80 minutes:  Patient with deteriorating function as assessed by history physical examination and nunez walk. We  Believe that hemodynamic monitoring is appropriate,  Reviewed hemodynamics and adjust parenteral medications and diuretics.  Prognosis: dire.

## 2017-01-13 NOTE — PLAN OF CARE
Problem: Goal Outcome Summary  Goal: Goal Outcome Summary  1. Pt will be hemodynamically stable  2. Pt s pain will be managed within stated goal    Pt admitted with fluid overload, abdominal pain, and nausea, hx of NICM secondary to polysubstance abuse. Vital signs stable, afebrile, A&Ox4, sinus rhythm. Abdominal pain well controlled with PRN oxycodone, tylenol and hot packs. Dobutamine gtt continues at 6 mcg/kg/min (6.5 ml/hr). Up with SBA. No further episodes of syncope. Voiding well. PCU collect. Mother at bedside and supportive. Slept between cares. Plan is for possible second opinion for LVAD placement. Continue to monitor and notify MD with pertinent changes.

## 2017-01-13 NOTE — PROGRESS NOTES
"D-It was reported to me by previous nurse, that pt experienced a syncopal episode while in the BR at around 1500. Pt apparently yelled for her mom and when the nurse got there, the pt lost consciousness and was lifted into her bed by nurses. See flow sheets for vs. Pt was supported by the nurses and did not fall or obtain any injury.  I-Dr. Kendrick and Dr. Mixon at the bedside to assess the pt. Obtained orders for orthostatics, EKG, chest xray, CRP, CBC, BMP, BNP, troponin, lactic acid, and digoxin level. Pt instructed not to get up to the BR without staff present.  A-Pt continued to appear to drift in and out of consciousness, but would open eyes and respond when spoken to. Continued to report dizziness. See flow sheets for frequent vs and lab results.   D-About and hour later, pt was woken from sleep with CP.  I-Dr. Mixon notified and was here to see pt. Pt was given IV dilaudid and zofran.   A-Troponin 0.058. CP resolved.   D-When I asked pt what she thought was going on, she said, \"I think I'm dying.\" Offered reassurance.  P-Continue to monitor closely. Recheck troponin at 0000. Maintain safe environment. Pt's mother was planning to drive home to [place], but has decided to spend the night in pt's room.    "

## 2017-01-13 NOTE — PROGRESS NOTES
"CLINICAL NUTRITION SERVICES - ASSESSMENT NOTE     Nutrition Prescription    RECOMMENDATIONS FOR MDs/PROVIDERS TO ORDER:  Continue 2 g Na+ diet as appropriate.  If pt with poor PO intakes, consider ordering regular diet to liberalize meal options if appropriate    Malnutrition Status:    Unable to determine due to pt busy, then out of room during attempts to see    Recommendations already ordered by Registered Dietitian (RD):  PRN supplement order    Future/Additional Recommendations:  If appetite declines, consider ordering calorie counts and/or scheduled supplements     REASON FOR ASSESSMENT  Anne Eugene is a/an 26 year old female assessed by the dietitian for [facility]    NUTRITION HISTORY  - Obtained info from chart, pt busy then out of room during attempts to see.    - PMH includes NICM 2/2 polysubstance abuse.  Seen by RDs in the past; last assessed on 12/14/16 and RD reported fair-good appetite that admission.  Has drank Ensure Plus shakes in the past.      CURRENT NUTRITION ORDERS  Diet: 2 g Sodium  Intake/Tolerance: Pt on 2 g Na+ diet 1/6, NPO 1/10-1/12 then adv to full liquids 1/12, then later adv back to 2 g Na+ diet on 1/12.  Appetite documented as fair 1/6-1/8, 1/12-1/13.  Consuming mostly % of meals documented (1 meal documented @ 25% on 1/9). Patient with nausea and abdominal pain this week, appears to be improving per chart review     LABS  Labs reviewed  - Na+ 130 (L); Phos 4.6 (H) on 1/12  - Cr 1.34 (H), GFR 48 (L)  - CRP 54 (H) on 1/12    MEDICATIONS  Medications reviewed  - Spironolactone    ANTHROPOMETRICS  Height: 160 cm (5' 3\")  Most Recent Weight: 73.029 kg (161 lb)    IBW: 52.3 kg   BMI: Overweight BMI 25-29.9  Weight History: Wt fluctuating over the past 3 months.  Per provider note pt's dry weight has been 156#  Wt Readings from Last 10 Encounters:   01/13/17 73.029 kg (161 lb)   01/06/17 72.122 kg (159 lb)   01/02/17 70.761 kg (156 lb)   12/20/16 73.029 kg (161 lb)   12/05/16 70.988 kg (156 " lb 8 oz)   11/30/16 73.982 kg (163 lb 1.6 oz)   11/21/16 74.798 kg (164 lb 14.4 oz)   10/31/16 72.712 kg (160 lb 4.8 oz)   10/21/16 71.532 kg (157 lb 11.2 oz)   10/14/16 70.534 kg (155 lb 8 oz)      Dosing Weight: 57 kg (adjusted based on lowest wt this admit of 70.9 kg on 1/12 and IBW 52.3 kg)    ASSESSED NUTRITION NEEDS  Estimated Energy Needs: 8712-3053 kcals/day (25 - 30 kcals/kg)  Justification: Maintenance  Estimated Protein Needs: 63-80 grams protein/day (1.1 - 1.4 grams of pro/kg)  Justification: Increased needs w/ cardiac status, pending renal function  Estimated Fluid Needs: 1 mL/kcal or per provider  Justification: Maintenance    PHYSICAL FINDINGS  See malnutrition section below.    MALNUTRITION  % Intake: Unable to assess  % Weight Loss: Difficult to assess given fluid status  Subcutaneous Fat Loss: Unable to assess  Muscle Loss: Unable to assess  Fluid Accumulation/Edema: None noted per provider note yesterday  Malnutrition Diagnosis: Unable to determine due to pt busy, then out of room during attempts to see    NUTRITION DIAGNOSIS  Inadequate protein-energy intake related to variable PO intakes and intermittent NPO x 2 days this week as evidenced by NPO x 2 days this week the documentation of fair appetite and consuming % of meals documented when diet ordered this week    INTERVENTIONS  Implementation  Nutrition Education: Unable to complete due to pt busy then out of room during attempts to see   Medical food supplement therapy    Goals  Patient to consume % of nutritionally adequate meal trays TID, or the equivalent with supplements/snacks.    Monitoring/Evaluation  Progress toward goals will be monitored and evaluated per protocol.     Emi Hall, RD, LD  6C RD Pager: [phone]

## 2017-01-14 ENCOUNTER — APPOINTMENT (OUTPATIENT)
Dept: GENERAL RADIOLOGY | Facility: CLINIC | Age: 27
DRG: 286 | End: 2017-01-14
Attending: INTERNAL MEDICINE
Payer: COMMERCIAL

## 2017-01-14 LAB
ABO + RH BLD: NORMAL
ABO + RH BLD: NORMAL
ALBUMIN SERPL-MCNC: 3 G/DL (ref 3.4–5)
ALBUMIN SERPL-MCNC: 3.4 G/DL (ref 3.4–5)
ALP SERPL-CCNC: 28 U/L (ref 40–150)
ALP SERPL-CCNC: 71 U/L (ref 40–150)
ALT SERPL W P-5'-P-CCNC: 433 U/L (ref 0–50)
ALT SERPL W P-5'-P-CCNC: 526 U/L (ref 0–50)
ANION GAP SERPL CALCULATED.3IONS-SCNC: 10 MMOL/L (ref 3–14)
ANION GAP SERPL CALCULATED.3IONS-SCNC: 11 MMOL/L (ref 3–14)
ANION GAP SERPL CALCULATED.3IONS-SCNC: 7 MMOL/L (ref 3–14)
AST SERPL W P-5'-P-CCNC: 209 U/L (ref 0–45)
AST SERPL W P-5'-P-CCNC: 295 U/L (ref 0–45)
BASE EXCESS BLDV CALC-SCNC: 10.1 MMOL/L
BASE EXCESS BLDV CALC-SCNC: 7.3 MMOL/L
BASE EXCESS BLDV CALC-SCNC: 8.7 MMOL/L
BASE EXCESS BLDV CALC-SCNC: 8.9 MMOL/L
BASE EXCESS BLDV CALC-SCNC: 9.3 MMOL/L
BASE EXCESS BLDV CALC-SCNC: 9.7 MMOL/L
BILIRUB SERPL-MCNC: 0.8 MG/DL (ref 0.2–1.3)
BILIRUB SERPL-MCNC: 1.2 MG/DL (ref 0.2–1.3)
BLD GP AB SCN SERPL QL: NORMAL
BLOOD BANK CMNT PATIENT-IMP: NORMAL
BUN SERPL-MCNC: 14 MG/DL (ref 7–30)
BUN SERPL-MCNC: 17 MG/DL (ref 7–30)
BUN SERPL-MCNC: 22 MG/DL (ref 7–30)
CALCIUM SERPL-MCNC: 7.6 MG/DL (ref 8.5–10.1)
CALCIUM SERPL-MCNC: 8.1 MG/DL (ref 8.5–10.1)
CALCIUM SERPL-MCNC: 8.2 MG/DL (ref 8.5–10.1)
CHLORIDE SERPL-SCNC: 95 MMOL/L (ref 94–109)
CHLORIDE SERPL-SCNC: 96 MMOL/L (ref 94–109)
CHLORIDE SERPL-SCNC: 97 MMOL/L (ref 94–109)
CO2 SERPL-SCNC: 30 MMOL/L (ref 20–32)
CO2 SERPL-SCNC: 30 MMOL/L (ref 20–32)
CO2 SERPL-SCNC: 31 MMOL/L (ref 20–32)
CREAT SERPL-MCNC: 1.01 MG/DL (ref 0.52–1.04)
CREAT SERPL-MCNC: 1.07 MG/DL (ref 0.52–1.04)
CREAT SERPL-MCNC: 1.14 MG/DL (ref 0.52–1.04)
ERYTHROCYTE [DISTWIDTH] IN BLOOD BY AUTOMATED COUNT: 15.3 % (ref 10–15)
GFR SERPL CREATININE-BSD FRML MDRD: 58 ML/MIN/1.7M2
GFR SERPL CREATININE-BSD FRML MDRD: 63 ML/MIN/1.7M2
GFR SERPL CREATININE-BSD FRML MDRD: 67 ML/MIN/1.7M2
GLUCOSE SERPL-MCNC: 117 MG/DL (ref 70–99)
GLUCOSE SERPL-MCNC: 75 MG/DL (ref 70–99)
GLUCOSE SERPL-MCNC: 85 MG/DL (ref 70–99)
HCO3 BLDV-SCNC: 32 MMOL/L (ref 21–28)
HCO3 BLDV-SCNC: 34 MMOL/L (ref 21–28)
HCO3 BLDV-SCNC: 35 MMOL/L (ref 21–28)
HCO3 BLDV-SCNC: 35 MMOL/L (ref 21–28)
HCT VFR BLD AUTO: 35.9 % (ref 35–47)
HGB BLD-MCNC: 11.4 G/DL (ref 11.7–15.7)
INR PPP: 2.61 (ref 0.86–1.14)
LACTATE BLD-SCNC: 0.9 MMOL/L (ref 0.7–2.1)
MAGNESIUM SERPL-MCNC: 1.6 MG/DL (ref 1.6–2.3)
MAGNESIUM SERPL-MCNC: 2.1 MG/DL (ref 1.6–2.3)
MCH RBC QN AUTO: 28.8 PG (ref 26.5–33)
MCHC RBC AUTO-ENTMCNC: 31.8 G/DL (ref 31.5–36.5)
MCV RBC AUTO: 91 FL (ref 78–100)
O2/TOTAL GAS SETTING VFR VENT: 21 %
OXYHGB MFR BLDV: 43 %
OXYHGB MFR BLDV: 45 %
OXYHGB MFR BLDV: 47 %
OXYHGB MFR BLDV: 51 %
OXYHGB MFR BLDV: 61 %
OXYHGB MFR BLDV: 62 %
PCO2 BLDV: 47 MM HG (ref 40–50)
PCO2 BLDV: 48 MM HG (ref 40–50)
PCO2 BLDV: 49 MM HG (ref 40–50)
PCO2 BLDV: 49 MM HG (ref 40–50)
PCO2 BLDV: 50 MM HG (ref 40–50)
PCO2 BLDV: 50 MM HG (ref 40–50)
PH BLDV: 7.44 PH (ref 7.32–7.43)
PH BLDV: 7.44 PH (ref 7.32–7.43)
PH BLDV: 7.45 PH (ref 7.32–7.43)
PH BLDV: 7.46 PH (ref 7.32–7.43)
PHOSPHATE SERPL-MCNC: 2.2 MG/DL (ref 2.5–4.5)
PHOSPHATE SERPL-MCNC: 2.3 MG/DL (ref 2.5–4.5)
PLATELET # BLD AUTO: 152 10E9/L (ref 150–450)
PO2 BLDV: 28 MM HG (ref 25–47)
PO2 BLDV: 29 MM HG (ref 25–47)
PO2 BLDV: 30 MM HG (ref 25–47)
PO2 BLDV: 31 MM HG (ref 25–47)
PO2 BLDV: 37 MM HG (ref 25–47)
PO2 BLDV: 37 MM HG (ref 25–47)
POTASSIUM SERPL-SCNC: 3.1 MMOL/L (ref 3.4–5.3)
POTASSIUM SERPL-SCNC: 3.7 MMOL/L (ref 3.4–5.3)
POTASSIUM SERPL-SCNC: 3.7 MMOL/L (ref 3.4–5.3)
PROT SERPL-MCNC: 6.1 G/DL (ref 6.8–8.8)
PROT SERPL-MCNC: 6.4 G/DL (ref 6.8–8.8)
RBC # BLD AUTO: 3.96 10E12/L (ref 3.8–5.2)
SODIUM SERPL-SCNC: 134 MMOL/L (ref 133–144)
SODIUM SERPL-SCNC: 135 MMOL/L (ref 133–144)
SODIUM SERPL-SCNC: 137 MMOL/L (ref 133–144)
SPECIMEN EXP DATE BLD: NORMAL
WBC # BLD AUTO: 4.3 10E9/L (ref 4–11)

## 2017-01-14 PROCEDURE — 82805 BLOOD GASES W/O2 SATURATION: CPT | Performed by: INTERNAL MEDICINE

## 2017-01-14 PROCEDURE — 25000125 ZZHC RX 250: Performed by: INTERNAL MEDICINE

## 2017-01-14 PROCEDURE — 40000196 ZZH STATISTIC RAPCV CVP MONITORING

## 2017-01-14 PROCEDURE — 86901 BLOOD TYPING SEROLOGIC RH(D): CPT | Performed by: INTERNAL MEDICINE

## 2017-01-14 PROCEDURE — 25000125 ZZHC RX 250

## 2017-01-14 PROCEDURE — 25000132 ZZH RX MED GY IP 250 OP 250 PS 637: Performed by: INTERNAL MEDICINE

## 2017-01-14 PROCEDURE — 20000004 ZZH R&B ICU UMMC

## 2017-01-14 PROCEDURE — 25000132 ZZH RX MED GY IP 250 OP 250 PS 637

## 2017-01-14 PROCEDURE — 86900 BLOOD TYPING SEROLOGIC ABO: CPT | Performed by: INTERNAL MEDICINE

## 2017-01-14 PROCEDURE — 71010 XR CHEST PORT 1 VW: CPT

## 2017-01-14 PROCEDURE — 85610 PROTHROMBIN TIME: CPT | Performed by: INTERNAL MEDICINE

## 2017-01-14 PROCEDURE — 85027 COMPLETE CBC AUTOMATED: CPT | Performed by: INTERNAL MEDICINE

## 2017-01-14 PROCEDURE — 80048 BASIC METABOLIC PNL TOTAL CA: CPT | Performed by: INTERNAL MEDICINE

## 2017-01-14 PROCEDURE — 99233 SBSQ HOSP IP/OBS HIGH 50: CPT | Mod: GC | Performed by: INTERNAL MEDICINE

## 2017-01-14 PROCEDURE — 86850 RBC ANTIBODY SCREEN: CPT | Performed by: INTERNAL MEDICINE

## 2017-01-14 PROCEDURE — 83735 ASSAY OF MAGNESIUM: CPT | Performed by: INTERNAL MEDICINE

## 2017-01-14 PROCEDURE — 84132 ASSAY OF SERUM POTASSIUM: CPT | Performed by: INTERNAL MEDICINE

## 2017-01-14 PROCEDURE — S0171 BUMETANIDE 0.5 MG: HCPCS | Performed by: INTERNAL MEDICINE

## 2017-01-14 PROCEDURE — 83605 ASSAY OF LACTIC ACID: CPT | Performed by: INTERNAL MEDICINE

## 2017-01-14 PROCEDURE — 40000275 ZZH STATISTIC RCP TIME EA 10 MIN

## 2017-01-14 PROCEDURE — 80053 COMPREHEN METABOLIC PANEL: CPT | Performed by: INTERNAL MEDICINE

## 2017-01-14 PROCEDURE — 84100 ASSAY OF PHOSPHORUS: CPT | Performed by: INTERNAL MEDICINE

## 2017-01-14 PROCEDURE — 40000048 ZZH STATISTIC DAILY SWAN MONITORING

## 2017-01-14 PROCEDURE — 25000125 ZZHC RX 250: Performed by: STUDENT IN AN ORGANIZED HEALTH CARE EDUCATION/TRAINING PROGRAM

## 2017-01-14 RX ORDER — WARFARIN SODIUM 1 MG/1
1 TABLET ORAL
Status: COMPLETED | OUTPATIENT
Start: 2017-01-14 | End: 2017-01-14

## 2017-01-14 RX ORDER — BUMETANIDE 0.25 MG/ML
3 INJECTION INTRAMUSCULAR; INTRAVENOUS ONCE
Status: COMPLETED | OUTPATIENT
Start: 2017-01-14 | End: 2017-01-14

## 2017-01-14 RX ORDER — BUMETANIDE 0.25 MG/ML
3 INJECTION INTRAMUSCULAR; INTRAVENOUS EVERY 12 HOURS
Status: DISCONTINUED | OUTPATIENT
Start: 2017-01-15 | End: 2017-01-15

## 2017-01-14 RX ADMIN — BUSPIRONE HYDROCHLORIDE 30 MG: 15 TABLET ORAL at 15:54

## 2017-01-14 RX ADMIN — Medication 2 G: at 05:20

## 2017-01-14 RX ADMIN — HYDROXYZINE HYDROCHLORIDE 50 MG: 50 TABLET, FILM COATED ORAL at 15:54

## 2017-01-14 RX ADMIN — HYDROXYZINE HYDROCHLORIDE 50 MG: 50 TABLET, FILM COATED ORAL at 19:30

## 2017-01-14 RX ADMIN — DULOXETINE 90 MG: 30 CAPSULE, DELAYED RELEASE ORAL at 08:42

## 2017-01-14 RX ADMIN — HYDROXYZINE HYDROCHLORIDE 50 MG: 50 TABLET, FILM COATED ORAL at 08:42

## 2017-01-14 RX ADMIN — POTASSIUM CHLORIDE 40 MEQ: 1500 TABLET, EXTENDED RELEASE ORAL at 05:20

## 2017-01-14 RX ADMIN — SODIUM NITROPRUSSIDE 0.99 MCG/KG/MIN: 25 INJECTION, SOLUTION, CONCENTRATE INTRAVENOUS at 09:24

## 2017-01-14 RX ADMIN — IRON 325 MG: 65 TABLET ORAL at 08:42

## 2017-01-14 RX ADMIN — SODIUM NITROPRUSSIDE 0.99 MCG/KG/MIN: 25 INJECTION, SOLUTION, CONCENTRATE INTRAVENOUS at 16:31

## 2017-01-14 RX ADMIN — IRON 325 MG: 65 TABLET ORAL at 19:30

## 2017-01-14 RX ADMIN — ERTAPENEM SODIUM 1 G: 1 INJECTION, POWDER, LYOPHILIZED, FOR SOLUTION INTRAMUSCULAR; INTRAVENOUS at 11:12

## 2017-01-14 RX ADMIN — BUSPIRONE HYDROCHLORIDE 30 MG: 15 TABLET ORAL at 19:30

## 2017-01-14 RX ADMIN — BUMETANIDE 3 MG: 0.25 INJECTION INTRAMUSCULAR; INTRAVENOUS at 08:45

## 2017-01-14 RX ADMIN — BUMETANIDE 3 MG: 0.25 INJECTION, SOLUTION INTRAMUSCULAR; INTRAVENOUS at 19:29

## 2017-01-14 RX ADMIN — POTASSIUM CHLORIDE 20 MEQ: 1500 TABLET, EXTENDED RELEASE ORAL at 06:59

## 2017-01-14 RX ADMIN — WARFARIN SODIUM 1 MG: 1 TABLET ORAL at 18:13

## 2017-01-14 RX ADMIN — POTASSIUM CHLORIDE 20 MEQ: 1500 TABLET, EXTENDED RELEASE ORAL at 11:17

## 2017-01-14 RX ADMIN — PANTOPRAZOLE SODIUM 40 MG: 40 TABLET, DELAYED RELEASE ORAL at 08:42

## 2017-01-14 RX ADMIN — BUSPIRONE HYDROCHLORIDE 30 MG: 15 TABLET ORAL at 08:41

## 2017-01-14 RX ADMIN — Medication 62.5 MCG: at 08:42

## 2017-01-14 RX ADMIN — POTASSIUM CHLORIDE 20 MEQ: 1500 TABLET, EXTENDED RELEASE ORAL at 18:14

## 2017-01-14 RX ADMIN — POTASSIUM PHOSPHATE, MONOBASIC AND POTASSIUM PHOSPHATE, DIBASIC 15 MMOL: 224; 236 INJECTION, SOLUTION INTRAVENOUS at 13:38

## 2017-01-14 NOTE — PLAN OF CARE
Problem: Patient Care Overview (Adult)  Goal: Individualization and Mutuality  Outcome: No Change  D: Arrived on unit at 1800 from cath lab for swan placement.  A/I: Dobutamine gtt continued. Nipride gtt started. CO 2.1, 3.1, 4.0. CI 1.2, 1.7, 2.2. CVP 20, 16, 16. PA 50/30, 47/22, 48/24. See flow sheet. 3 mg bumex given, 2.2 L output.   P: Will continue to monitor and notify MD of any changes.

## 2017-01-14 NOTE — PROGRESS NOTES
"[facility]  Cardiology II Service  Daily Note  January 14, 2017      Interval History:   Nursing notes reviewed.   1. She diuresed well overnight w/ improvement in HD  2. Nipride uptitrated last night due to rising SVR  3. She continues to be on dobutamine 6 mcg/kg/ min and digoxin 62.5mcg for inotropy      Pertinent Medications:  bumex  Warfarin  Dobutamine  Digoxin  nipride    Hemodynamics:  CVP: 13,  PA: 50/28,  Mean PA: 38,  CI: 2.1,  SVR: 1330    Examination:  /60 mmHg  Pulse 81  Temp(Src) 98.6  F (37  C) (Oral)  Resp 16  Ht 1.6 m (5' 3\")  Wt 73.3 kg (161 lb 9.6 oz)  BMI 28.63 kg/m2  SpO2 98%  GEN: awake, alert, resting comfortably in bed,  cooperative and conversational    HEENT: no scleral icterus, mucus membranes moist  NECK: Supple, swan in right IJ  RESP: breathing comfortably on RA, good b/l air entry, minimal bibasilar crackles, rest CTA  CV: Regular, normal rate, normal S1 and S2, a holosystolic murmur in tricuspid area  ABD: Soft, mildly tender in RUQ,  no rigidity or peritoneal signs  EXT: No peripheral edema, warm/well perfused    NEURO: AAO * 3, no gross focal deficits    Data:  CMP  Recent Labs  Lab 01/14/17  0403 01/13/17 2003 01/13/17  0655 01/13/17 01/12/17  1655  01/11/17  0641    132* 130* 129* 135  < > 133   POTASSIUM 3.1* 4.2 4.4 4.8 4.3  < > 4.5   CHLORIDE 96 94 93* 91* 96  < > 95   CO2 30 30 29 29 29  < > 28   ANIONGAP 11 8 8 9 10  < > 10   GLC 85 99 88 105* 133*  < > 78   BUN 22 25 26 25 25  < > 35*   CR 1.14* 1.42* 1.34* 1.34* 1.40*  < > 1.56*   GFRESTIMATED 58* 45* 48* 48* 46*  < > 41*   GFRESTBLACK 71 55* 59* 59* 56*  < > 49*   MARIO 8.1* 8.2* 9.0 8.9 8.0*  < > 8.9   MAG 1.6 1.9  --   --  2.0  --  2.1   PHOS 2.3* 3.1  --   --  4.6*  --   --    PROTTOTAL 6.4* 6.5* 6.8 7.1 6.6*  < > 7.0   ALBUMIN 3.4 3.4 3.6 3.8 3.3*  < > 3.7   BILITOTAL 0.8 0.8 1.3 1.1 0.8  < > 1.0   ALKPHOS 71 65 70 74 70  < > 72   * 351* 335* 194* 118*  < " > 236*   * 574* 531* 459* 391*  < > 577*   < > = values in this interval not displayed.  CBC  Recent Labs  Lab 01/14/17  0403 01/13/17 2003 01/13/17  0655 01/12/17  1655   WBC 4.3 5.4 3.9* 4.9   RBC 3.96 3.95 4.00 3.90   HGB 11.4* 11.5* 11.5* 11.4*   HCT 35.9 36.0 36.6 37.0   MCV 91 91 92 95   MCH 28.8 29.1 28.8 29.2   MCHC 31.8 31.9 31.4* 30.8*   RDW 15.3* 15.3* 15.6* 15.8*    148* 141* 190     INR  Recent Labs  Lab 01/13/17  0655 01/12/17  0350 01/11/17  1739 01/10/17  0700   INR 3.58* 2.94* 2.67* 3.37*     Arterial Blood Gas  Recent Labs  Lab 01/14/17  0403 01/13/17  2328 01/13/17 2003   O2PER 21.0 21.0 21       Assessment and Plan  26 year old female with PMHx significant for NICM secondary to polysubstance abuse (methamphetamine, marijuana, tobacco; initially diagnosed 7/2016 upon transfer from OSH in severe cardiogenic shock, bilateral PEs, and shock liver), s/p ICD (10/12/16), 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    Updates:  - continue diuresis today  - continue dobutamine 6 mcg/ kg/ min, digoxin 62.5 mcg  - hold spironolactone, while on nipride    # Cardiogenic shock/  NICM secondary to polysubstance abuse  with EF 20-25% s/p ICD: decompensated w/ leave- in swan   Dry weight is 156 lbs.    NYHA Class IIIb, Stage D.     - Continue dobutamine at 6 mcg/kg/min  - continue digoxin 62.5 mcg  - nipride for afterload reduction currently at 1mcg/kg/min  - diuresis: bumex 3mg IV bid  - Q4 hr HD  - strict I/O, daily weights  - f/u K and Mg while diuresing   - 8th admission for heart failure since being diagnosed in July 2016. Anne is unable to be considered for advanced heart failure therapies, including LVAD and heart transplant, until she is sober for 1 year. Most recent known substance use was marijuana in October 2016.    Patient and family wondering about second  opinion to get LVAD sooner.    She will see Dr. Baeza in Clinic after discharge.    # Non-infectious aortic valve vegetation (EVAN 2016)    # Gallstones, suspected cholecystitis/ pain is now much improved and she is tolerating oral intake: US and CT showing GB wall thickening and inflammation w/ cholelithiasis  No CBD dilation  - tolerating regular diet now  - NO oxycodone for pain, given hx of addiction  - zofran prn for nausea  - completed 5-day course of ertapenem  - General surgery consulted for lap cholecystectomy vs percutaneous drainage. Given that HIDA scan was negative for cholecystitis (although reduced EF), surgery team recommended holding off on any procedures given high operative risk and questionable improvement in symptoms with the surgery    # Elevated trans-aminases/ improvin/2 gallstones and congestive hepatopathy  - down-trending, continue to trend bid    # ROBERT on CKD/ resolved: baseline Cr is 0.65, started rising  due to cardiogenic shock (low flow and cardio-renal) , now down trending to 1.0  - strict I/O, daily weights  - f/u Cr and lytes  - avoid nephrotoxins    # Bilateral PE    - Warfarin per pharmacy  - Daily INR    # Chronic normocytic anemia  Last iron studies suggest anemia of chronic disease, likely from cardiomyopathy. Hgb at baseline of ~ 11.0  - Continue iron supplements    # Anxiety/Depression:     Given social circumstances surround her current medical status, was visibly upset multiple times during previous admission. Psychiatry saw her on  and recommended to increase cymbalta and for her to be referred back to her established psychiatrist at discharge with recommendations for follow up with psychotherapist.    - Continue buspar, cymbalta, atarax  - NO benzos      FEN: cardiac diet  Prophylaxis: on warfarin for PE in 2016  Code Status: full  Disposition: critically ill, in ICU    Plan of care discussed with Dr. Aroldo Mixon MD  PGY3 Internal  Medicine  [phone]      I personally saw and examined this patient, reviewed imaging and laboratory studies, confirmed physical examination and discussed results and plan with patient and or family.

## 2017-01-14 NOTE — PROGRESS NOTES
Hemodynamics done at bedside:    RA: 15, PA: 48/ 26, PCWP: 24, CI: 1.7 , SVO2: 47%, SVR: 1440    Coty Mixon MD  IM PGY3

## 2017-01-15 LAB
ALBUMIN SERPL-MCNC: 2.9 G/DL (ref 3.4–5)
ALBUMIN SERPL-MCNC: 3.1 G/DL (ref 3.4–5)
ALP SERPL-CCNC: 72 U/L (ref 40–150)
ALP SERPL-CCNC: 78 U/L (ref 40–150)
ALT SERPL W P-5'-P-CCNC: 330 U/L (ref 0–50)
ALT SERPL W P-5'-P-CCNC: 383 U/L (ref 0–50)
ANION GAP SERPL CALCULATED.3IONS-SCNC: 6 MMOL/L (ref 3–14)
ANION GAP SERPL CALCULATED.3IONS-SCNC: 8 MMOL/L (ref 3–14)
AST SERPL W P-5'-P-CCNC: 112 U/L (ref 0–45)
AST SERPL W P-5'-P-CCNC: 157 U/L (ref 0–45)
BASE EXCESS BLDV CALC-SCNC: 4.7 MMOL/L
BASE EXCESS BLDV CALC-SCNC: 6.1 MMOL/L
BASE EXCESS BLDV CALC-SCNC: 6.2 MMOL/L
BASE EXCESS BLDV CALC-SCNC: 7.6 MMOL/L
BASE EXCESS BLDV CALC-SCNC: 8.1 MMOL/L
BASE EXCESS BLDV CALC-SCNC: 8.6 MMOL/L
BASE EXCESS BLDV CALC-SCNC: 8.7 MMOL/L
BILIRUB SERPL-MCNC: 0.7 MG/DL (ref 0.2–1.3)
BILIRUB SERPL-MCNC: 1 MG/DL (ref 0.2–1.3)
BUN SERPL-MCNC: 13 MG/DL (ref 7–30)
BUN SERPL-MCNC: 13 MG/DL (ref 7–30)
CALCIUM SERPL-MCNC: 7.7 MG/DL (ref 8.5–10.1)
CALCIUM SERPL-MCNC: 8.4 MG/DL (ref 8.5–10.1)
CHLORIDE SERPL-SCNC: 104 MMOL/L (ref 94–109)
CHLORIDE SERPL-SCNC: 98 MMOL/L (ref 94–109)
CO2 SERPL-SCNC: 30 MMOL/L (ref 20–32)
CO2 SERPL-SCNC: 33 MMOL/L (ref 20–32)
CREAT SERPL-MCNC: 1 MG/DL (ref 0.52–1.04)
CREAT SERPL-MCNC: 1.04 MG/DL (ref 0.52–1.04)
GFR SERPL CREATININE-BSD FRML MDRD: 65 ML/MIN/1.7M2
GFR SERPL CREATININE-BSD FRML MDRD: 68 ML/MIN/1.7M2
GLUCOSE SERPL-MCNC: 86 MG/DL (ref 70–99)
GLUCOSE SERPL-MCNC: 89 MG/DL (ref 70–99)
HCO3 BLDV-SCNC: 29 MMOL/L (ref 21–28)
HCO3 BLDV-SCNC: 31 MMOL/L (ref 21–28)
HCO3 BLDV-SCNC: 31 MMOL/L (ref 21–28)
HCO3 BLDV-SCNC: 33 MMOL/L (ref 21–28)
HCO3 BLDV-SCNC: 33 MMOL/L (ref 21–28)
HCO3 BLDV-SCNC: 34 MMOL/L (ref 21–28)
HCO3 BLDV-SCNC: 34 MMOL/L (ref 21–28)
INR PPP: 2 (ref 0.86–1.14)
MAGNESIUM SERPL-MCNC: 1.6 MG/DL (ref 1.6–2.3)
MAGNESIUM SERPL-MCNC: 1.9 MG/DL (ref 1.6–2.3)
O2/TOTAL GAS SETTING VFR VENT: 21 %
O2/TOTAL GAS SETTING VFR VENT: ABNORMAL %
OXYHGB MFR BLDV: 34 %
OXYHGB MFR BLDV: 39 %
OXYHGB MFR BLDV: 47 %
OXYHGB MFR BLDV: 56 %
OXYHGB MFR BLDV: 57 %
OXYHGB MFR BLDV: 60 %
OXYHGB MFR BLDV: 65 %
PCO2 BLDV: 39 MM HG (ref 40–50)
PCO2 BLDV: 44 MM HG (ref 40–50)
PCO2 BLDV: 45 MM HG (ref 40–50)
PCO2 BLDV: 48 MM HG (ref 40–50)
PCO2 BLDV: 49 MM HG (ref 40–50)
PCO2 BLDV: 50 MM HG (ref 40–50)
PCO2 BLDV: 53 MM HG (ref 40–50)
PH BLDV: 7.42 PH (ref 7.32–7.43)
PH BLDV: 7.44 PH (ref 7.32–7.43)
PH BLDV: 7.45 PH (ref 7.32–7.43)
PH BLDV: 7.47 PH (ref 7.32–7.43)
PH BLDV: 7.48 PH (ref 7.32–7.43)
PHOSPHATE SERPL-MCNC: 2.1 MG/DL (ref 2.5–4.5)
PHOSPHATE SERPL-MCNC: 2.6 MG/DL (ref 2.5–4.5)
PO2 BLDV: 26 MM HG (ref 25–47)
PO2 BLDV: 26 MM HG (ref 25–47)
PO2 BLDV: 34 MM HG (ref 25–47)
PO2 BLDV: 35 MM HG (ref 25–47)
PO2 BLDV: 36 MM HG (ref 25–47)
POTASSIUM SERPL-SCNC: 3.4 MMOL/L (ref 3.4–5.3)
POTASSIUM SERPL-SCNC: 3.6 MMOL/L (ref 3.4–5.3)
POTASSIUM SERPL-SCNC: 4 MMOL/L (ref 3.4–5.3)
PROT SERPL-MCNC: 5.9 G/DL (ref 6.8–8.8)
PROT SERPL-MCNC: 6.1 G/DL (ref 6.8–8.8)
SODIUM SERPL-SCNC: 137 MMOL/L (ref 133–144)
SODIUM SERPL-SCNC: 141 MMOL/L (ref 133–144)

## 2017-01-15 PROCEDURE — 82805 BLOOD GASES W/O2 SATURATION: CPT | Performed by: INTERNAL MEDICINE

## 2017-01-15 PROCEDURE — 85610 PROTHROMBIN TIME: CPT | Performed by: INTERNAL MEDICINE

## 2017-01-15 PROCEDURE — 25000132 ZZH RX MED GY IP 250 OP 250 PS 637: Performed by: INTERNAL MEDICINE

## 2017-01-15 PROCEDURE — 99222 1ST HOSP IP/OBS MODERATE 55: CPT | Performed by: PSYCHIATRY & NEUROLOGY

## 2017-01-15 PROCEDURE — 25000125 ZZHC RX 250

## 2017-01-15 PROCEDURE — 25000132 ZZH RX MED GY IP 250 OP 250 PS 637

## 2017-01-15 PROCEDURE — 99291 CRITICAL CARE FIRST HOUR: CPT | Mod: GC | Performed by: INTERNAL MEDICINE

## 2017-01-15 PROCEDURE — 84100 ASSAY OF PHOSPHORUS: CPT | Performed by: INTERNAL MEDICINE

## 2017-01-15 PROCEDURE — 25000125 ZZHC RX 250: Performed by: INTERNAL MEDICINE

## 2017-01-15 PROCEDURE — 40000048 ZZH STATISTIC DAILY SWAN MONITORING

## 2017-01-15 PROCEDURE — 80053 COMPREHEN METABOLIC PANEL: CPT | Performed by: INTERNAL MEDICINE

## 2017-01-15 PROCEDURE — 20000004 ZZH R&B ICU UMMC

## 2017-01-15 PROCEDURE — 25000128 H RX IP 250 OP 636

## 2017-01-15 PROCEDURE — S0171 BUMETANIDE 0.5 MG: HCPCS | Performed by: INTERNAL MEDICINE

## 2017-01-15 PROCEDURE — 83735 ASSAY OF MAGNESIUM: CPT | Performed by: INTERNAL MEDICINE

## 2017-01-15 PROCEDURE — 40000196 ZZH STATISTIC RAPCV CVP MONITORING

## 2017-01-15 RX ORDER — BUMETANIDE 0.25 MG/ML
3 INJECTION INTRAMUSCULAR; INTRAVENOUS EVERY 6 HOURS
Status: COMPLETED | OUTPATIENT
Start: 2017-01-15 | End: 2017-01-15

## 2017-01-15 RX ORDER — BUMETANIDE 0.25 MG/ML
2 INJECTION INTRAMUSCULAR; INTRAVENOUS
Status: DISCONTINUED | OUTPATIENT
Start: 2017-01-15 | End: 2017-01-15

## 2017-01-15 RX ORDER — SODIUM CHLORIDE 9 MG/ML
INJECTION, SOLUTION INTRAVENOUS
Status: COMPLETED
Start: 2017-01-15 | End: 2017-01-15

## 2017-01-15 RX ORDER — BUMETANIDE 0.25 MG/ML
3 INJECTION INTRAMUSCULAR; INTRAVENOUS EVERY 8 HOURS
Status: DISCONTINUED | OUTPATIENT
Start: 2017-01-15 | End: 2017-01-15

## 2017-01-15 RX ORDER — BUMETANIDE 0.25 MG/ML
3 INJECTION INTRAMUSCULAR; INTRAVENOUS 3 TIMES DAILY
Status: DISCONTINUED | OUTPATIENT
Start: 2017-01-16 | End: 2017-01-18

## 2017-01-15 RX ORDER — WARFARIN SODIUM 1 MG/1
1 TABLET ORAL
Status: COMPLETED | OUTPATIENT
Start: 2017-01-15 | End: 2017-01-15

## 2017-01-15 RX ORDER — DULOXETIN HYDROCHLORIDE 30 MG/1
120 CAPSULE, DELAYED RELEASE ORAL DAILY
Status: DISCONTINUED | OUTPATIENT
Start: 2017-01-16 | End: 2017-01-20 | Stop reason: HOSPADM

## 2017-01-15 RX ORDER — BUMETANIDE 0.25 MG/ML
3 INJECTION INTRAMUSCULAR; INTRAVENOUS 3 TIMES DAILY
Status: DISCONTINUED | OUTPATIENT
Start: 2017-01-15 | End: 2017-01-15

## 2017-01-15 RX ADMIN — HYDROXYZINE HYDROCHLORIDE 50 MG: 50 TABLET, FILM COATED ORAL at 22:11

## 2017-01-15 RX ADMIN — WARFARIN SODIUM 1 MG: 1 TABLET ORAL at 17:46

## 2017-01-15 RX ADMIN — BUMETANIDE 3 MG: 0.25 INJECTION INTRAMUSCULAR; INTRAVENOUS at 07:21

## 2017-01-15 RX ADMIN — Medication 62.5 MCG: at 08:56

## 2017-01-15 RX ADMIN — IRON 325 MG: 65 TABLET ORAL at 08:55

## 2017-01-15 RX ADMIN — BUSPIRONE HYDROCHLORIDE 30 MG: 15 TABLET ORAL at 14:25

## 2017-01-15 RX ADMIN — PANTOPRAZOLE SODIUM 40 MG: 40 TABLET, DELAYED RELEASE ORAL at 08:55

## 2017-01-15 RX ADMIN — BUMETANIDE 3 MG: 0.25 INJECTION INTRAMUSCULAR; INTRAVENOUS at 20:44

## 2017-01-15 RX ADMIN — SODIUM NITROPRUSSIDE 1.5 MCG/KG/MIN: 25 INJECTION, SOLUTION, CONCENTRATE INTRAVENOUS at 08:03

## 2017-01-15 RX ADMIN — POTASSIUM CHLORIDE 20 MEQ: 1500 TABLET, EXTENDED RELEASE ORAL at 17:47

## 2017-01-15 RX ADMIN — Medication 2 G: at 18:13

## 2017-01-15 RX ADMIN — DOBUTAMINE 6 MCG/KG/MIN: 12.5 INJECTION, SOLUTION, CONCENTRATE INTRAVENOUS at 05:23

## 2017-01-15 RX ADMIN — ONDANSETRON 4 MG: 4 TABLET, ORALLY DISINTEGRATING ORAL at 22:14

## 2017-01-15 RX ADMIN — HYDROXYZINE HYDROCHLORIDE 50 MG: 50 TABLET, FILM COATED ORAL at 14:25

## 2017-01-15 RX ADMIN — BUSPIRONE HYDROCHLORIDE 30 MG: 15 TABLET ORAL at 20:43

## 2017-01-15 RX ADMIN — SODIUM NITROPRUSSIDE 1.5 MCG/KG/MIN: 25 INJECTION, SOLUTION, CONCENTRATE INTRAVENOUS at 00:29

## 2017-01-15 RX ADMIN — BUSPIRONE HYDROCHLORIDE 30 MG: 15 TABLET ORAL at 08:54

## 2017-01-15 RX ADMIN — HYDROXYZINE HYDROCHLORIDE 50 MG: 50 TABLET, FILM COATED ORAL at 08:55

## 2017-01-15 RX ADMIN — SODIUM NITROPRUSSIDE 1.5 MCG/KG/MIN: 25 INJECTION, SOLUTION, CONCENTRATE INTRAVENOUS at 15:24

## 2017-01-15 RX ADMIN — IRON 325 MG: 65 TABLET ORAL at 20:44

## 2017-01-15 RX ADMIN — SODIUM CHLORIDE 10 ML: 9 INJECTION, SOLUTION INTRAVENOUS at 21:58

## 2017-01-15 RX ADMIN — DULOXETINE 90 MG: 30 CAPSULE, DELAYED RELEASE ORAL at 08:55

## 2017-01-15 RX ADMIN — MILRINONE LACTATE 0.12 MCG/KG/MIN: 200 INJECTION, SOLUTION INTRAVENOUS at 11:00

## 2017-01-15 RX ADMIN — BUMETANIDE 3 MG: 0.25 INJECTION INTRAMUSCULAR; INTRAVENOUS at 15:23

## 2017-01-15 RX ADMIN — SODIUM NITROPRUSSIDE 2.25 MCG/KG/MIN: 25 INJECTION, SOLUTION, CONCENTRATE INTRAVENOUS at 20:42

## 2017-01-15 RX ADMIN — POTASSIUM CHLORIDE 20 MEQ: 29.8 INJECTION, SOLUTION INTRAVENOUS at 00:59

## 2017-01-15 NOTE — CONSULTS
TODAY'S DATE:  01/15/2017      IDENTIFICATION:  Ms. Anne Eugene is a 26-year-old white female currently hospitalized in our Cardiac Intensive Care Unit with an exacerbation of heart failure.  I am asked to evaluate her depression by Dr. Wiggins.      CHIEF COMPLAINT:  Depression.      Prior to interviewing this patient, I had an opportunity to review my previous psychiatric consultation from 08/06/2016 and also Dr. Burnett's consultation, which was completed on 12/14/2016.  Ms. Eugene has a significant history of polysubstance use including methamphetamine and heroin.  She has not used these substances since my consult in August.  She has, however, continued to have major depressive disorder treated with Cymbalta.  Dr. Burnett increased her Cymbalta to 90 mg on 12/14.      Prior to interviewing this patient, I also had had a long discussion with pharmacy.  The patient's most recent QTc was 499, so we are very limited in what we can do.  I reviewed a QTc and duloxetine in UpToDate.  Duloxetine is not known to raise QTc.  Pharmacy felt that adding another medication would likely not be appropriate, but it would be okay to increase the Cymbalta to 120.      On interviewing the patient, she reports she is actually sleeping relatively well and eating pretty well.  However, she continues to have a sad mood, decreased interest and decreased energy.  She would like to have the Cymbalta increased, therefore, I will be recommending Cymbalta 120 mg.      PAST MEDICAL HISTORY:  Depression, polysubstance abuse, currently in remission and cardiomyopathy.  She has allergy to the penicillins and Ceclor.      FAMILY HISTORY:  The patient's biological family history is unknown.  She was adopted.      SOCIAL HISTORY:  She lives with her mother in [place].  She has other family in [place].  She is a high school graduate.  She graduated cosmetology school and she unfortunately has had 1 miscarriage.       PHYSICAL REVIEW OF SYSTEMS:  On my interview today, the patient denied headache or problems with vision or hearing.  She has intermittent chest pain and shortness of breath.  Earlier today she had abdominal pain but that has resolved.  She reports that she is defecating and urinating.  She denies problems with muscle, skin or joints, but gets somewhat dizzy when she walks very far.      MENTAL STATUS EXAMINATION:  On my interview, the patient was a pleasant, cooperative female.  Her mood was reported as sad.  Her affect was restricted.  Her speech was coherent and goal oriented.  Her associations were tight.  Her thought processes logical and linear.  Content of thought was without psychosis or suicidal ideation.  Recent and remote memory, concentration, fund of knowledge and use of language were within normal limits.  She is alert and oriented x3.  Insight and judgment are currently intact.  Muscle strength seems to be somewhat decreased as is bulk.  Recent vitals include a temperature of 97.2, heart rate of 87, respiration rate of 16 with 99% oxygen saturation and a blood pressure of 91/60.      ASSESSMENT:   1.  Major depressive disorder.   2.  Polysubstance abuse, in remission.      RECOMMENDATION:  Increase Cymbalta to 120 mg.         KYA CARBALLO MD             D: 01/15/2017 14:48   T: 01/15/2017 15:57   MT: [email or web address]      Name:     MARTINA MCQUENE   MRN:      [id number]        Account:       [id number]   :      1990           Consult Date:  01/15/2017      Document: [id number]

## 2017-01-15 NOTE — PROGRESS NOTES
HD :    RA: 8, RA: 45/ 28, mean PA: 35, SvO2: 35%, PCWP: 19, CI: 2 and SVR: 1380.     Coty Mixon MD  IM PGY3

## 2017-01-15 NOTE — PLAN OF CARE
Patient is a 26 year old full code female with polysubstance abuse and ef of 20%.  Alert and oriented x4 PERRLA briskly, bilateral  equal, moves all extremities without limitations.  Denies chest pain.  PA 48/23, CVP 14, CO 3.4 and CI 1.9 this am with Svo2 57.  OOB without issues voiding into commode.  S1S2 with murmur appreciated.  No edema.  BM yesterday and tolerating 2GM Na diet.  20 meq K replaced last night to 4.0 this am. PA catheter at 49 cm.  Nipride remains at 1.5 mcg/kg/min and Dobutamine at 6mcg/kg/min. Single lumen PICC patent. No visitors this shift.

## 2017-01-15 NOTE — PROGRESS NOTES
"[facility]  Cardiology Service  Daily Note  January 15, 2017      Interval History:   Nursing notes reviewed.  1. Nipride gtt was increased last night for rising SVR and low CI (1.7)  2. She continues to diurese well, kidney function is normalizing and liver enzymes are trending down    Pertinent Medications:  Dobutamine @ 6 mcg/kg/min  bumex 3mg iv tid  Nipride @ 1.5mcg/kg/min  Warfarin  Digoxin @ 62.5mcg    Hemodynamics:  CVP: 14,  PA: 48/23, CI: 1.9,  SVR: 1400    Examination:  /58 mmHg  Pulse 81  Temp(Src) 98.2  F (36.8  C) (Axillary)  Resp 16  Ht 1.6 m (5' 3\")  Wt 73.3 kg (161 lb 9.6 oz)  BMI 28.63 kg/m2  SpO2 97%  GEN: awake, alert, resting comfortably in bed,  cooperative and conversational    HEENT: no scleral icterus, mucus membranes moist  NECK: Supple, swan in right IJ  RESP: breathing comfortably on RA, good b/l air entry, CTAB  CV: Regular, normal rate, normal S1 and S2, a holosystolic murmur in tricuspid area  ABD: Soft, non tender, non distended, NABS  EXT: No peripheral edema, warm/well perfused    NEURO: AAO * 3, no gross focal deficits    Data:  CMP  Recent Labs  Lab 01/15/17  0415 01/14/17  2320 01/14/17  1542 01/14/17  0902 01/14/17  0403 01/13/17 2003 01/12/17  1655     --  135 134 137 132*  < > 135   POTASSIUM 4.0 3.4 3.7 3.7 3.1* 4.2  < > 4.3   CHLORIDE 98  --  97 95 96 94  < > 96   CO2 33*  --  31 30 30 30  < > 29   ANIONGAP 6  --  7 10 11 8  < > 10   GLC 89  --  117* 75 85 99  < > 133*   BUN 13  --  14 17 22 25  < > 25   CR 1.00  --  1.07* 1.01 1.14* 1.42*  < > 1.40*   GFRESTIMATED 68  --  63 67 58* 45*  < > 46*   GFRESTBLACK 82  --  76 81 71 55*  < > 56*   MARIO 8.4*  --  7.6* 8.2* 8.1* 8.2*  < > 8.0*   MAG  --   --   --  2.1 1.6 1.9  --  2.0   PHOS  --   --   --  2.2* 2.3* 3.1  --  4.6*   PROTTOTAL 6.1*  --  6.1*  --  6.4* 6.5*  < > 6.6*   ALBUMIN 3.1*  --  3.0*  --  3.4 3.4  < > 3.3*   BILITOTAL 1.0  --  1.2  --  0.8 0.8  < > 0.8 "   ALKPHOS 78  --  28*  --  71 65  < > 70   *  --  209*  --  295* 351*  < > 118*   *  --  433*  --  526* 574*  < > 391*   < > = values in this interval not displayed.  CBC  Recent Labs  Lab 01/14/17  0403 01/13/17 2003 01/13/17  0655 01/12/17  1655   WBC 4.3 5.4 3.9* 4.9   RBC 3.96 3.95 4.00 3.90   HGB 11.4* 11.5* 11.5* 11.4*   HCT 35.9 36.0 36.6 37.0   MCV 91 91 92 95   MCH 28.8 29.1 28.8 29.2   MCHC 31.8 31.9 31.4* 30.8*   RDW 15.3* 15.3* 15.6* 15.8*    148* 141* 190     INR  Recent Labs  Lab 01/15/17  0415 01/14/17  0902 01/13/17  0655 01/12/17  0350   INR 2.00* 2.61* 3.58* 2.94*     Arterial Blood Gas  Recent Labs  Lab 01/15/17  0400 01/14/17  2352 01/14/17 2012 01/14/17  1659   O2PER 2L 2LPM 21.0 21.0       Imaging Studies:  CXR :  IMPRESSION:    1. Right IJ pulmonary artery catheter with tip in the distal right  main pulmonary artery. Otherwise stable support devices.  2. Continued enlarged cardiac silhouette.    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    Updates:  - continue diuresis today w/ bumex IV 3mg tid. Goal net negative ~ 2 L  - start milrinone at 0.125 mcg/kg/min, decrease dobutamine to 3 mcg/kg/min when starting milrinone. The dobutamine gtt will be stopped 2 hrs after starting milrinone. Will uptitrate milrinone and down-titrate nipride based on HD. continue digoxin 62.5 mcg    # Cardiogenic shock/  NICM secondary to polysubstance abuse  with EF 20-25% s/p ICD: decompensated w/ leave- in swan   Dry weight is 156 lbs.    NYHA Class IIIb, Stage D.     - start milrinone at 0.125 mcg/  kg/min, titrate up as indicated based on BP and HD  - decrease dobutamine to half i.e 3mcg/kg/min and stop 2 hrs after starting milrinone  - continue digoxin 62.5 mcg  - nipride for afterload reduction currently at 1.5mcg/kg/min. Will down titrate as we are increasing milrinone, goal is to replace dobutamine and nipride with milrinone  - diuresis: bumex 3mg IV bid  - Q4 hr HD  - strict I/O, daily weights  - f/u K and Mg while diuresing    - 8th admission for heart failure since being diagnosed in 2016. Anne is unable to be considered for advanced heart failure therapies, including LVAD and heart transplant, until she is sober for 1 year. Most recent known substance use was marijuana in 2016.    Patient and family wondering about second opinion to get LVAD sooner.    She will see Dr. Baeza in Clinic after discharge.    # Non-infectious aortic valve vegetation (EVAN 2016)    # Gallstones, suspected cholecystitis/ pain is now much improved and she is tolerating oral intake: US and CT showing GB wall thickening and inflammation w/ cholelithiasis  No CBD dilation  - tolerating regular diet now  - NO oxycodone for pain, given hx of addiction  - zofran prn for nausea  - completed 5-day course of ertapenem  - General surgery consulted for lap cholecystectomy vs percutaneous drainage. Given that HIDA scan was negative for cholecystitis (although reduced EF), surgery team recommended holding off on any procedures given high operative risk and questionable improvement in symptoms with the surgery    # Elevated trans-aminases/ improvin/2 gallstones and congestive hepatopathy  - down-trending, continue to trend bid    # ROBERT on CKD:  baseline Cr is 0.65, started rising  due to cardiogenic shock (low flow and cardio-renal) , now down trending to 1.0  - strict I/O, daily weights  - f/u Cr and lytes  - avoid nephrotoxins    # Bilateral PE  2016  - Warfarin per pharmacy  - Daily INR    # Chronic normocytic  anemia  Last iron studies suggest anemia of chronic disease, likely from cardiomyopathy. Hgb at baseline of ~ 11.0  - Continue iron supplements    # Anxiety/Depression:     Given social circumstances surround her current medical status, was visibly upset multiple times during previous admission. Psychiatry saw her on 12/14 and recommended to increase cymbalta and for her to be referred back to her established psychiatrist at discharge with recommendations for follow up with psychotherapist.    - Continue buspar, cymbalta, atarax  - psych consulted today for depression  - NO benzos      FEN: cardiac diet  Prophylaxis: on warfarin for PE in 7/2016  Code Status: full  Disposition: critically ill, in ICU    Plan of care discussed with Dr. Aroldo Mixon MD  PGY3 Internal Medicine  [phone]    Critical Care ICU Note - Cardiology  Chris Kendrick M.D.    Impression:    Cardiogenic shock  Acute and chronic congestive heart failure  Inotrope dependence  Hepatic congestion      The patient remains unstable in the ICU with on-going need for vparenteral medications for the adjustment of blood pressure and cardiac output and maintenance of renal function.      I personally reviewed:    Arterial and venous blood gases to assess acid base balance, oxygenation    Hemodynamic parameters obtained by central hemodynamic monitoring including RAP, estimated LVEDP, pulmonary artery pressure, cardiac output and vascular resistances in order to adjust fluids and infused medications for blood pressure and cardiac output maintenance.    Volume status, renal function and nutritional support as judged by intake, output, daily weight and appropriate laboratories.    I reviewed individual and serial imaging studies including echocardiogram, chest x-ray, CT scan    I personally supervised the prescription of parenteral fluids, inotropes, vasodilators , and vasopressors in order to correct cardiac output, maintain urine output and  renal function.    I personall discussed [facility] pateints condition with the patient or family designated decision maker in order to discuss current and on-going diagnostic and therapeutic options.    Will attempt to discontinue dobutamine and nipride via conversion of milrinone  Add digoxin  Depression management    Results for MARTINA MCQUEEN (MRN [id number]) as of 1/15/2017 12:17   Ref. Range 1/15/2017 04:00 1/15/2017 04:15 1/15/2017 09:26 1/15/2017 11:50   Sodium Latest Ref Range: 133-144 mmol/L  137     Potassium Latest Ref Range: 3.4-5.3 mmol/L  4.0     Chloride Latest Ref Range:  mmol/L  98     Carbon Dioxide Latest Ref Range: 20-32 mmol/L  33 (H)     Urea Nitrogen Latest Ref Range: 7-30 mg/dL  13     Creatinine Latest Ref Range: 0.52-1.04 mg/dL  1.00     GFR Estimate Latest Ref Range: >60 mL/min/1.7m2  68     GFR Estimate If Black Latest Ref Range: >60 mL/min/1.7m2  82     Calcium Latest Ref Range: 8.5-10.1 mg/dL  8.4 (L)     Anion Gap Latest Ref Range: 3-14 mmol/L  6     Magnesium Latest Ref Range: 1.6-2.3 mg/dL  1.9     Phosphorus Latest Ref Range: 2.5-4.5 mg/dL  2.6     Albumin Latest Ref Range: 3.4-5.0 g/dL  3.1 (L)     Protein Total Latest Ref Range: 6.8-8.8 g/dL  6.1 (L)     Bilirubin Total Latest Ref Range: 0.2-1.3 mg/dL  1.0     Alkaline Phosphatase Latest Ref Range:  U/L  78     ALT Latest Ref Range: 0-50 U/L  383 (H)     AST Latest Ref Range: 0-45 U/L  157 (H)     Glucose Latest Ref Range: 70-99 mg/dL  89     FIO2 Unknown 2L  2L 21.0   Ph Venous Latest Ref Range: 7.32-7.43 pH 7.47 (H)  7.44 (H) 7.45 (H)   PCO2 Venous Latest Ref Range: 40-50 mm Hg 45  49 44   PO2 Venous Latest Ref Range: 25-47 mm Hg 36  34 36   Bicarbonate Venous Latest Ref Range: 21-28 mmol/L 33 (H)  34 (H) 31 (H)   Base Excess Venous Latest Units: mmol/L 8.1  8.7 6.1   Oxyhemoglobin Venous Latest Units: % 57  56 60   INR Latest Ref Range: 0.86-1.14   2.00 (H)       01/15/17 0930 69.9 kg (154 lb 1.6 oz)       01/14/17 0400  73.3 kg (161 lb 9.6 oz) SS     01/13/17 0525 73.029 kg (161 lb) AC     01/12/17 0352 70.897 kg (156 lb 4.8 oz) LM     01/11/17 0430 72.576 kg (160 lb) IF     01/10/17 0430 72.122 kg (159 lb) IF     01/09/17 0258 71.895 kg (158 lb 8 oz) MS     01/08/17 0526 71.396 kg (157 lb 6.4 oz) LK     01/07/17 0515 72.077 kg (158 lb 14.4 oz) MS     01/06/17 1649 71.94 kg (158 lb 9.6 oz) KG                Critical Care 45 minutes

## 2017-01-16 ENCOUNTER — APPOINTMENT (OUTPATIENT)
Dept: GENERAL RADIOLOGY | Facility: CLINIC | Age: 27
DRG: 286 | End: 2017-01-16
Attending: INTERNAL MEDICINE
Payer: COMMERCIAL

## 2017-01-16 ENCOUNTER — APPOINTMENT (OUTPATIENT)
Dept: CARDIOLOGY | Facility: CLINIC | Age: 27
DRG: 286 | End: 2017-01-16
Attending: INTERNAL MEDICINE
Payer: COMMERCIAL

## 2017-01-16 LAB
ALBUMIN SERPL-MCNC: 3 G/DL (ref 3.4–5)
ALBUMIN SERPL-MCNC: 3.1 G/DL (ref 3.4–5)
ALP SERPL-CCNC: 65 U/L (ref 40–150)
ALP SERPL-CCNC: 67 U/L (ref 40–150)
ALT SERPL W P-5'-P-CCNC: 275 U/L (ref 0–50)
ALT SERPL W P-5'-P-CCNC: 302 U/L (ref 0–50)
ANION GAP SERPL CALCULATED.3IONS-SCNC: 6 MMOL/L (ref 3–14)
ANION GAP SERPL CALCULATED.3IONS-SCNC: 8 MMOL/L (ref 3–14)
AST SERPL W P-5'-P-CCNC: 71 U/L (ref 0–45)
AST SERPL W P-5'-P-CCNC: 86 U/L (ref 0–45)
BACTERIA SPEC CULT: NO GROWTH
BACTERIA SPEC CULT: NO GROWTH
BASE EXCESS BLDV CALC-SCNC: 10.2 MMOL/L
BASE EXCESS BLDV CALC-SCNC: 8 MMOL/L
BASE EXCESS BLDV CALC-SCNC: 8.3 MMOL/L
BASE EXCESS BLDV CALC-SCNC: 9.2 MMOL/L
BASE EXCESS BLDV CALC-SCNC: 9.3 MMOL/L
BASE EXCESS BLDV CALC-SCNC: 9.3 MMOL/L
BASE EXCESS BLDV CALC-SCNC: 9.8 MMOL/L
BASOPHILS # BLD AUTO: 0 10E9/L (ref 0–0.2)
BASOPHILS NFR BLD AUTO: 0.5 %
BILIRUB SERPL-MCNC: 0.7 MG/DL (ref 0.2–1.3)
BILIRUB SERPL-MCNC: 0.8 MG/DL (ref 0.2–1.3)
BUN SERPL-MCNC: 11 MG/DL (ref 7–30)
BUN SERPL-MCNC: 13 MG/DL (ref 7–30)
CALCIUM SERPL-MCNC: 8 MG/DL (ref 8.5–10.1)
CALCIUM SERPL-MCNC: 8.3 MG/DL (ref 8.5–10.1)
CHLORIDE SERPL-SCNC: 101 MMOL/L (ref 94–109)
CHLORIDE SERPL-SCNC: 101 MMOL/L (ref 94–109)
CO2 SERPL-SCNC: 30 MMOL/L (ref 20–32)
CO2 SERPL-SCNC: 33 MMOL/L (ref 20–32)
CREAT SERPL-MCNC: 0.95 MG/DL (ref 0.52–1.04)
CREAT SERPL-MCNC: 1 MG/DL (ref 0.52–1.04)
DIFFERENTIAL METHOD BLD: NORMAL
EOSINOPHIL # BLD AUTO: 0.2 10E9/L (ref 0–0.7)
EOSINOPHIL NFR BLD AUTO: 3.2 %
ERYTHROCYTE [DISTWIDTH] IN BLOOD BY AUTOMATED COUNT: 15.6 % (ref 10–15)
GFR SERPL CREATININE-BSD FRML MDRD: 68 ML/MIN/1.7M2
GFR SERPL CREATININE-BSD FRML MDRD: 72 ML/MIN/1.7M2
GLUCOSE SERPL-MCNC: 90 MG/DL (ref 70–99)
GLUCOSE SERPL-MCNC: 99 MG/DL (ref 70–99)
HCO3 BLDV-SCNC: 33 MMOL/L (ref 21–28)
HCO3 BLDV-SCNC: 34 MMOL/L (ref 21–28)
HCO3 BLDV-SCNC: 35 MMOL/L (ref 21–28)
HCT VFR BLD AUTO: 35.2 % (ref 35–47)
HGB BLD-MCNC: 11.1 G/DL (ref 11.7–15.7)
IMM GRANULOCYTES # BLD: 0 10E9/L (ref 0–0.4)
IMM GRANULOCYTES NFR BLD: 0.2 %
INR PPP: 1.55 (ref 0.86–1.14)
LACTATE BLD-SCNC: 1.2 MMOL/L (ref 0.7–2.1)
LYMPHOCYTES # BLD AUTO: 1.6 10E9/L (ref 0.8–5.3)
LYMPHOCYTES NFR BLD AUTO: 29 %
MAGNESIUM SERPL-MCNC: 2 MG/DL (ref 1.6–2.3)
MCH RBC QN AUTO: 28.8 PG (ref 26.5–33)
MCHC RBC AUTO-ENTMCNC: 31.5 G/DL (ref 31.5–36.5)
MCV RBC AUTO: 91 FL (ref 78–100)
MICRO REPORT STATUS: NORMAL
MICRO REPORT STATUS: NORMAL
MONOCYTES # BLD AUTO: 0.3 10E9/L (ref 0–1.3)
MONOCYTES NFR BLD AUTO: 5.7 %
NEUTROPHILS # BLD AUTO: 3.5 10E9/L (ref 1.6–8.3)
NEUTROPHILS NFR BLD AUTO: 61.4 %
O2/TOTAL GAS SETTING VFR VENT: 21 %
OXYHGB MFR BLDV: 41 %
OXYHGB MFR BLDV: 44 %
OXYHGB MFR BLDV: 49 %
OXYHGB MFR BLDV: 50 %
OXYHGB MFR BLDV: 50 %
OXYHGB MFR BLDV: 56 %
OXYHGB MFR BLDV: 59 %
PCO2 BLDV: 46 MM HG (ref 40–50)
PCO2 BLDV: 48 MM HG (ref 40–50)
PCO2 BLDV: 49 MM HG (ref 40–50)
PCO2 BLDV: 50 MM HG (ref 40–50)
PCO2 BLDV: 51 MM HG (ref 40–50)
PCO2 BLDV: 51 MM HG (ref 40–50)
PCO2 BLDV: 55 MM HG (ref 40–50)
PH BLDV: 7.4 PH (ref 7.32–7.43)
PH BLDV: 7.44 PH (ref 7.32–7.43)
PH BLDV: 7.45 PH (ref 7.32–7.43)
PH BLDV: 7.46 PH (ref 7.32–7.43)
PH BLDV: 7.48 PH (ref 7.32–7.43)
PHOSPHATE SERPL-MCNC: 2.6 MG/DL (ref 2.5–4.5)
PLATELET # BLD AUTO: 145 10E9/L (ref 150–450)
PO2 BLDV: 28 MM HG (ref 25–47)
PO2 BLDV: 29 MM HG (ref 25–47)
PO2 BLDV: 30 MM HG (ref 25–47)
PO2 BLDV: 30 MM HG (ref 25–47)
PO2 BLDV: 31 MM HG (ref 25–47)
PO2 BLDV: 33 MM HG (ref 25–47)
PO2 BLDV: 35 MM HG (ref 25–47)
POTASSIUM SERPL-SCNC: 3.4 MMOL/L (ref 3.4–5.3)
POTASSIUM SERPL-SCNC: 3.4 MMOL/L (ref 3.4–5.3)
PROT SERPL-MCNC: 6 G/DL (ref 6.8–8.8)
PROT SERPL-MCNC: 6.3 G/DL (ref 6.8–8.8)
RBC # BLD AUTO: 3.86 10E12/L (ref 3.8–5.2)
SODIUM SERPL-SCNC: 139 MMOL/L (ref 133–144)
SODIUM SERPL-SCNC: 140 MMOL/L (ref 133–144)
SPECIMEN SOURCE: NORMAL
SPECIMEN SOURCE: NORMAL
WBC # BLD AUTO: 5.6 10E9/L (ref 4–11)

## 2017-01-16 PROCEDURE — 93306 TTE W/DOPPLER COMPLETE: CPT

## 2017-01-16 PROCEDURE — 25000132 ZZH RX MED GY IP 250 OP 250 PS 637: Performed by: INTERNAL MEDICINE

## 2017-01-16 PROCEDURE — 25000125 ZZHC RX 250: Performed by: INTERNAL MEDICINE

## 2017-01-16 PROCEDURE — 83735 ASSAY OF MAGNESIUM: CPT | Performed by: INTERNAL MEDICINE

## 2017-01-16 PROCEDURE — 25000125 ZZHC RX 250

## 2017-01-16 PROCEDURE — 85004 AUTOMATED DIFF WBC COUNT: CPT | Performed by: INTERNAL MEDICINE

## 2017-01-16 PROCEDURE — 85610 PROTHROMBIN TIME: CPT | Performed by: INTERNAL MEDICINE

## 2017-01-16 PROCEDURE — 20000004 ZZH R&B ICU UMMC

## 2017-01-16 PROCEDURE — 25000132 ZZH RX MED GY IP 250 OP 250 PS 637

## 2017-01-16 PROCEDURE — S0171 BUMETANIDE 0.5 MG: HCPCS | Performed by: INTERNAL MEDICINE

## 2017-01-16 PROCEDURE — 25000125 ZZHC RX 250: Performed by: STUDENT IN AN ORGANIZED HEALTH CARE EDUCATION/TRAINING PROGRAM

## 2017-01-16 PROCEDURE — 40000048 ZZH STATISTIC DAILY SWAN MONITORING

## 2017-01-16 PROCEDURE — 25000132 ZZH RX MED GY IP 250 OP 250 PS 637: Performed by: STUDENT IN AN ORGANIZED HEALTH CARE EDUCATION/TRAINING PROGRAM

## 2017-01-16 PROCEDURE — 40000196 ZZH STATISTIC RAPCV CVP MONITORING

## 2017-01-16 PROCEDURE — 80053 COMPREHEN METABOLIC PANEL: CPT | Performed by: INTERNAL MEDICINE

## 2017-01-16 PROCEDURE — 99291 CRITICAL CARE FIRST HOUR: CPT | Mod: 25 | Performed by: INTERNAL MEDICINE

## 2017-01-16 PROCEDURE — 93306 TTE W/DOPPLER COMPLETE: CPT | Mod: 26 | Performed by: INTERNAL MEDICINE

## 2017-01-16 PROCEDURE — 84100 ASSAY OF PHOSPHORUS: CPT | Performed by: INTERNAL MEDICINE

## 2017-01-16 PROCEDURE — 83605 ASSAY OF LACTIC ACID: CPT | Performed by: INTERNAL MEDICINE

## 2017-01-16 PROCEDURE — 71010 XR CHEST PORT 1 VW: CPT

## 2017-01-16 PROCEDURE — 85027 COMPLETE CBC AUTOMATED: CPT | Performed by: INTERNAL MEDICINE

## 2017-01-16 PROCEDURE — 82805 BLOOD GASES W/O2 SATURATION: CPT | Performed by: INTERNAL MEDICINE

## 2017-01-16 PROCEDURE — 40000275 ZZH STATISTIC RCP TIME EA 10 MIN

## 2017-01-16 RX ORDER — POTASSIUM CHLORIDE 29.8 MG/ML
20 INJECTION INTRAVENOUS
Status: DISCONTINUED | OUTPATIENT
Start: 2017-01-16 | End: 2017-01-20 | Stop reason: HOSPADM

## 2017-01-16 RX ORDER — POTASSIUM CHLORIDE 7.45 MG/ML
10 INJECTION INTRAVENOUS
Status: DISCONTINUED | OUTPATIENT
Start: 2017-01-16 | End: 2017-01-20 | Stop reason: HOSPADM

## 2017-01-16 RX ORDER — METOLAZONE 2.5 MG/1
5 TABLET ORAL ONCE
Status: COMPLETED | OUTPATIENT
Start: 2017-01-16 | End: 2017-01-16

## 2017-01-16 RX ORDER — POTASSIUM CHLORIDE 750 MG/1
20-40 TABLET, EXTENDED RELEASE ORAL
Status: DISCONTINUED | OUTPATIENT
Start: 2017-01-16 | End: 2017-01-20 | Stop reason: HOSPADM

## 2017-01-16 RX ORDER — POTASSIUM CHLORIDE 1.5 G/1.58G
20-40 POWDER, FOR SOLUTION ORAL
Status: DISCONTINUED | OUTPATIENT
Start: 2017-01-16 | End: 2017-01-20 | Stop reason: HOSPADM

## 2017-01-16 RX ORDER — DOBUTAMINE HYDROCHLORIDE 200 MG/100ML
2.5-2 INJECTION INTRAVENOUS CONTINUOUS
Status: DISCONTINUED | OUTPATIENT
Start: 2017-01-16 | End: 2017-01-16

## 2017-01-16 RX ORDER — DOBUTAMINE HCL IN DEXTROSE 5 % 500MG/250
7.5 INTRAVENOUS SOLUTION INTRAVENOUS CONTINUOUS
Status: DISCONTINUED | OUTPATIENT
Start: 2017-01-16 | End: 2017-01-20 | Stop reason: HOSPADM

## 2017-01-16 RX ORDER — POTASSIUM CHLORIDE 750 MG/1
20 TABLET, EXTENDED RELEASE ORAL ONCE
Status: COMPLETED | OUTPATIENT
Start: 2017-01-16 | End: 2017-01-16

## 2017-01-16 RX ORDER — WARFARIN SODIUM 3 MG/1
3 TABLET ORAL
Status: COMPLETED | OUTPATIENT
Start: 2017-01-16 | End: 2017-01-16

## 2017-01-16 RX ADMIN — Medication 62.5 MCG: at 09:33

## 2017-01-16 RX ADMIN — PANTOPRAZOLE SODIUM 40 MG: 40 TABLET, DELAYED RELEASE ORAL at 09:32

## 2017-01-16 RX ADMIN — HYDROXYZINE HYDROCHLORIDE 50 MG: 50 TABLET, FILM COATED ORAL at 13:17

## 2017-01-16 RX ADMIN — POTASSIUM CHLORIDE 20 MEQ: 750 TABLET, EXTENDED RELEASE ORAL at 20:29

## 2017-01-16 RX ADMIN — SODIUM NITROPRUSSIDE 1.75 MCG/KG/MIN: 25 INJECTION, SOLUTION, CONCENTRATE INTRAVENOUS at 01:55

## 2017-01-16 RX ADMIN — BUMETANIDE 3 MG: 0.25 INJECTION, SOLUTION INTRAMUSCULAR; INTRAVENOUS at 17:32

## 2017-01-16 RX ADMIN — POTASSIUM CHLORIDE 20 MEQ: 29.8 INJECTION, SOLUTION INTRAVENOUS at 18:40

## 2017-01-16 RX ADMIN — SODIUM NITROPRUSSIDE 1.25 MCG/KG/MIN: 25 INJECTION, SOLUTION, CONCENTRATE INTRAVENOUS at 08:44

## 2017-01-16 RX ADMIN — ACETAMINOPHEN 650 MG: 325 TABLET, FILM COATED ORAL at 11:26

## 2017-01-16 RX ADMIN — SODIUM NITROPRUSSIDE 3 MCG/KG/MIN: 25 INJECTION, SOLUTION, CONCENTRATE INTRAVENOUS at 13:14

## 2017-01-16 RX ADMIN — BUSPIRONE HYDROCHLORIDE 30 MG: 15 TABLET ORAL at 13:17

## 2017-01-16 RX ADMIN — BUSPIRONE HYDROCHLORIDE 30 MG: 15 TABLET ORAL at 20:25

## 2017-01-16 RX ADMIN — POTASSIUM CHLORIDE 20 MEQ: 1500 TABLET, EXTENDED RELEASE ORAL at 05:50

## 2017-01-16 RX ADMIN — Medication 2 G: at 06:08

## 2017-01-16 RX ADMIN — WARFARIN SODIUM 3 MG: 3 TABLET ORAL at 17:33

## 2017-01-16 RX ADMIN — DULOXETINE 120 MG: 30 CAPSULE, DELAYED RELEASE ORAL at 09:32

## 2017-01-16 RX ADMIN — ACETAMINOPHEN 650 MG: 325 TABLET, FILM COATED ORAL at 23:53

## 2017-01-16 RX ADMIN — DOBUTAMINE 5 MCG/KG/MIN: 12.5 INJECTION, SOLUTION INTRAVENOUS at 09:36

## 2017-01-16 RX ADMIN — IRON 325 MG: 65 TABLET ORAL at 09:31

## 2017-01-16 RX ADMIN — METOLAZONE 5 MG: 2.5 TABLET ORAL at 15:34

## 2017-01-16 RX ADMIN — IRON 325 MG: 65 TABLET ORAL at 20:26

## 2017-01-16 RX ADMIN — BUMETANIDE 3 MG: 0.25 INJECTION, SOLUTION INTRAMUSCULAR; INTRAVENOUS at 05:54

## 2017-01-16 RX ADMIN — BUMETANIDE 3 MG: 0.25 INJECTION, SOLUTION INTRAMUSCULAR; INTRAVENOUS at 12:28

## 2017-01-16 RX ADMIN — MILRINONE LACTATE 0.38 MCG/KG/MIN: 200 INJECTION, SOLUTION INTRAVENOUS at 03:00

## 2017-01-16 RX ADMIN — HYDROXYZINE HYDROCHLORIDE 50 MG: 50 TABLET, FILM COATED ORAL at 20:26

## 2017-01-16 RX ADMIN — HYDROXYZINE HYDROCHLORIDE 50 MG: 50 TABLET, FILM COATED ORAL at 09:31

## 2017-01-16 RX ADMIN — BUSPIRONE HYDROCHLORIDE 30 MG: 15 TABLET ORAL at 09:33

## 2017-01-16 RX ADMIN — SODIUM NITROPRUSSIDE 2 MCG/KG/MIN: 25 INJECTION, SOLUTION, CONCENTRATE INTRAVENOUS at 20:34

## 2017-01-16 ASSESSMENT — PAIN DESCRIPTION - DESCRIPTORS
DESCRIPTORS: HEADACHE
DESCRIPTORS: HEADACHE

## 2017-01-16 NOTE — PLAN OF CARE
Problem: Goal Outcome Summary  Goal: Goal Outcome Summary  1. Pt will be hemodynamically stable  2. Pt s pain will be managed within stated goal  Outcome: No Change  D:NICM 2nd to Polysubstance abuse, EF 20%  I/A: Alert and Oriented x4, stand by assist. NSR, no ectopy. MAP goal 65-75, SvO2 low overnight (44)- trending up (50). Milrinone gtt increased to 0.375 and Nipride gtt 1.5. Bumex also started yesterday. Adequate UOP. 2 small BMs. PA pressurs and KRYSTLE scores monitored q4 hrs. Nausea x1- zofran given  P: Continue monitoring hemodynamics

## 2017-01-16 NOTE — PROGRESS NOTES
"[facility]  Cardiology Service  Daily Note  January 16, 2017      Interval History:   Nursing notes reviewed.   1. She diuresed well yesterday  2. Increasing requirements of nipride overnight, CI remained below 2. Milrinone increased upto 0.375  3. She did not feel well this morning, complained of intermittent nausea. Plan to switch back to dobutamine since she continued to require increasing nipride and milrinone without much improvement in her hemodynamics.    Pertinent Medications:  bumex   Dobutamine  Nipride  Digoxin    Hemodynamics:  CVP: 12,  PA: 47/28,  Mean PA: 33,  CI: 1.7,  SVR: 1640    PCWP: 25    Examination:  BP 71/58 mmHg  Pulse 81  Temp(Src) 98.2  F (36.8  C) (Oral)  Resp 16  Ht 1.6 m (5' 3\")  Wt 69.9 kg (154 lb 1.6 oz)  BMI 27.30 kg/m2  SpO2 97%  GEN: awake, alert, resting comfortably in bed    HEENT: no scleral icterus, mucus membranes moist  NECK: Supple, swan in right IJ  RESP: breathing comfortably on RA, good b/l air entry, CTAB  CV: Regular, normal rate, normal S1 and S2, a holosystolic murmur in tricuspid area  ABD: Soft, non tender, non distended, NABS  EXT: No peripheral edema, warm/well perfused    NEURO: AAO * 3, no gross focal deficits    Data:  CMP  Recent Labs  Lab 01/16/17  0420 01/15/17  1617 01/15/17  0415 01/14/17  2320 01/14/17  1542 01/14/17  0902    141 137  --  135 134   POTASSIUM 3.4 3.6 4.0 3.4 3.7 3.7   CHLORIDE 101 104 98  --  97 95   CO2 30 30 33*  --  31 30   ANIONGAP 8 8 6  --  7 10   GLC 90 86 89  --  117* 75   BUN 13 13 13  --  14 17   CR 0.95 1.04 1.00  --  1.07* 1.01   GFRESTIMATED 72 65 68  --  63 67   GFRESTBLACK 87 78 82  --  76 81   MARIO 8.3* 7.7* 8.4*  --  7.6* 8.2*   MAG 2.0 1.6 1.9  --   --  2.1   PHOS 2.6 2.1* 2.6  --   --  2.2*   PROTTOTAL 6.0* 5.9* 6.1*  --  6.1*  --    ALBUMIN 3.0* 2.9* 3.1*  --  3.0*  --    BILITOTAL 0.7 0.7 1.0  --  1.2  --    ALKPHOS 67 72 78  --  28*  --    AST 86* 112* 157*  --  209*  " --    * 330* 383*  --  433*  --      CBC  Recent Labs  Lab 01/16/17 0420 01/14/17  0403 01/13/17 2003 01/13/17  0655   WBC 5.6 4.3 5.4 3.9*   RBC 3.86 3.96 3.95 4.00   HGB 11.1* 11.4* 11.5* 11.5*   HCT 35.2 35.9 36.0 36.6   MCV 91 91 91 92   MCH 28.8 28.8 29.1 28.8   MCHC 31.5 31.8 31.9 31.4*   RDW 15.6* 15.3* 15.3* 15.6*   * 152 148* 141*     INR  Recent Labs  Lab 01/16/17  0420 01/15/17  0415 01/14/17  0902 01/13/17  0655   INR 1.55* 2.00* 2.61* 3.58*     Arterial Blood Gas  Recent Labs  Lab 01/16/17 0420 01/16/17  0010 01/15/17  2035 01/15/17  1802   O2PER 21 21.0 21.0 21       Imaging Studies:  CXR:  IMPRESSION:    1. Right [place] catheter tip in proximal/mid right pulmonary  artery.  2. Stable mild cardiomegaly.    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    Updates:  - continue diuresis today w/ bumex IV 3mg tid. Additional metolazone given today. Goal net negative ~ 2 L  - switched back from milrinone to dobutamine, since with milrinone she continued to require increasing doses of nipride.     # Cardiogenic shock/  NICM secondary to polysubstance abuse  with EF 20-25% s/p ICD: decompensated w/ leave- in swan     NYHA Class IIIb, Stage D.     - dobutamine @ 5mcg/kg/min  - nipride gtt for afterload reduction  - milrinone was stopped 2 hrs after initiation of dobutamine  - continue digoxin 62.5 mcg  - diuresis: bumex 3mg IV tid, metolazone x1 today  - Q4 hr HD  - strict I/O, daily weights  - f/u K and Mg while diuresing    - 8th admission for heart failure since being diagnosed in July 2016. Anne  is unable to be considered for advanced heart failure therapies, including LVAD and heart transplant, until she is sober for 1 year (most recent known substance use was marijuana in 2016).  However, in setting of her decompensations, LVAD candidacy will be re-evaluated.    # Non-infectious aortic valve vegetation (EVAN 2016)    # Gallstones, suspected cholecystitis/ pain is now resolved and she is tolerating oral intake: US and CT showing GB wall thickening and inflammation w/ cholelithiasis  No CBD dilation  - tolerating regular diet now  - NO oxycodone for pain, given hx of addiction  - zofran prn for nausea  - completed 5-day course of ertapenem  - General surgery consulted for lap cholecystectomy vs percutaneous drainage. Given that HIDA scan was negative for cholecystitis (although reduced EF), surgery team recommended holding off on any procedures given high operative risk and questionable improvement in symptoms with the surgery    # Elevated trans-aminases/ improvin/2 gallstones and congestive hepatopathy  - down-trending, continue to trend bid    # ROBERT on CKD:  baseline Cr is 0.65, started rising  due to cardiogenic shock (low flow and cardio-renal) , now down trending to 0.95  - strict I/O, daily weights  - f/u Cr and lytes  - avoid nephrotoxins    # Bilateral PE  2016  - Warfarin per pharmacy  - Daily INR    # Chronic normocytic anemia  Last iron studies suggest anemia of chronic disease, likely from cardiomyopathy. Hgb at baseline of ~ 11.0  - Continue iron supplements    # Anxiety/Depression:     Given social circumstances surround her current medical status, was visibly upset multiple times during previous admission. Psychiatry saw her on  and recommended to increase cymbalta and for her to be referred back to her established psychiatrist at discharge with recommendations for follow up with psychotherapist.    - Continue buspar, cymbalta, atarax  - psych consulted for  depression, cymbalta increased to 120mg  - NO benzos      FEN: cardiac diet  Prophylaxis: on warfarin for PE in 7/2016  Code Status: full  Disposition: transfer to floor today    Plan of care discussed with Dr. Aroldo Mixon MD  PGY3 Internal Medicine  [phone]      Critical Care ICU Note - Cardiology  Chris Kendrick M.D.    Impression:    Cardiogenic shock  Acute and chronic congestive heart failure      The patient remains unstable in the ICU with o parenteral medications for the adjustment of blood pressure and cardiac output and maintenance of renal function.      The patient is seen for  shock requiring vasopressor and or inotropic agents; low cardiac output necessitating  inotropic agents, vasopressors and afterload reducing agents;     I personally reviewed:    Arterial and venous blood gases to assess acid base balance, oxygenation, and ventilator settings.      Hemodynamic parameters obtained by central hemodynamic monitoring including RAP, estimated LVEDP, pulmonary artery pressure, cardiac output and vascular resistances in order to adjust fluids and infused medications for blood pressure and cardiac output maintenance.      Volume status, renal function and nutritional support as judged by intake, output, daily weight and appropriate laboratories.    I reviewed individual and serial imaging studies including echocardiogram, chest x-ray, CT scan    I personally supervised the prescription of parenteral fluids, inotropes, vasodilators , and vasopressors in order to correct cardiac output, maintain urine output and renal function.    The patients RV function is review for potential LVAD and would appear to be satisfactory despite high RA pressure.  I believe that the patient is an acceptable candidate for LVAD despite high risk social status.  Given her age and adherence to recovery program to this date would suggest we proceed quickly as she is dying.  She clearly understands that this is  destination and that were she to have device dysfunction/deterioration related to inability to follow the management program and/or recovery program that she would not have device replacement.      45 minutes

## 2017-01-16 NOTE — PLAN OF CARE
Problem: Goal Outcome Summary  Goal: Goal Outcome Summary  1. Pt will be hemodynamically stable  2. Pt s pain will be managed within stated goal  Alert and oriented x 4. VSS, afebrile.  Dopamine titrated off, Milrinone started.  Nipride @ 1.5.  Low Svo2 (34) at 1600.  Milrinone increased 0.25 and Nipride increased to 2.0.  Repeat SVo2 39, nipride increased to 2.25.  See flowsheets for complete hemodynamics.  Continue to monitor.

## 2017-01-17 ENCOUNTER — APPOINTMENT (OUTPATIENT)
Dept: GENERAL RADIOLOGY | Facility: CLINIC | Age: 27
DRG: 286 | End: 2017-01-17
Attending: INTERNAL MEDICINE
Payer: COMMERCIAL

## 2017-01-17 LAB
ALBUMIN SERPL-MCNC: 3.3 G/DL (ref 3.4–5)
ALBUMIN SERPL-MCNC: 3.5 G/DL (ref 3.4–5)
ALP SERPL-CCNC: 76 U/L (ref 40–150)
ALP SERPL-CCNC: 80 U/L (ref 40–150)
ALT SERPL W P-5'-P-CCNC: 240 U/L (ref 0–50)
ALT SERPL W P-5'-P-CCNC: 248 U/L (ref 0–50)
ANION GAP SERPL CALCULATED.3IONS-SCNC: 6 MMOL/L (ref 3–14)
ANION GAP SERPL CALCULATED.3IONS-SCNC: 8 MMOL/L (ref 3–14)
AST SERPL W P-5'-P-CCNC: 58 U/L (ref 0–45)
AST SERPL W P-5'-P-CCNC: 64 U/L (ref 0–45)
BASE EXCESS BLDV CALC-SCNC: 11.3 MMOL/L
BASE EXCESS BLDV CALC-SCNC: 11.5 MMOL/L
BASE EXCESS BLDV CALC-SCNC: 11.6 MMOL/L
BASE EXCESS BLDV CALC-SCNC: 12 MMOL/L
BASE EXCESS BLDV CALC-SCNC: 12 MMOL/L
BASE EXCESS BLDV CALC-SCNC: 12.1 MMOL/L
BASE EXCESS BLDV CALC-SCNC: 13.4 MMOL/L
BILIRUB SERPL-MCNC: 0.9 MG/DL (ref 0.2–1.3)
BILIRUB SERPL-MCNC: 0.9 MG/DL (ref 0.2–1.3)
BUN SERPL-MCNC: 13 MG/DL (ref 7–30)
BUN SERPL-MCNC: 15 MG/DL (ref 7–30)
CALCIUM SERPL-MCNC: 8.6 MG/DL (ref 8.5–10.1)
CALCIUM SERPL-MCNC: 8.8 MG/DL (ref 8.5–10.1)
CHLORIDE SERPL-SCNC: 96 MMOL/L (ref 94–109)
CHLORIDE SERPL-SCNC: 97 MMOL/L (ref 94–109)
CO2 SERPL-SCNC: 33 MMOL/L (ref 20–32)
CO2 SERPL-SCNC: 35 MMOL/L (ref 20–32)
CREAT SERPL-MCNC: 1.06 MG/DL (ref 0.52–1.04)
CREAT SERPL-MCNC: 1.19 MG/DL (ref 0.52–1.04)
ERYTHROCYTE [DISTWIDTH] IN BLOOD BY AUTOMATED COUNT: 15.5 % (ref 10–15)
GFR SERPL CREATININE-BSD FRML MDRD: 56 ML/MIN/1.7M2
GFR SERPL CREATININE-BSD FRML MDRD: 63 ML/MIN/1.7M2
GLUCOSE SERPL-MCNC: 75 MG/DL (ref 70–99)
GLUCOSE SERPL-MCNC: 93 MG/DL (ref 70–99)
HCO3 BLDV-SCNC: 36 MMOL/L (ref 21–28)
HCO3 BLDV-SCNC: 37 MMOL/L (ref 21–28)
HCO3 BLDV-SCNC: 38 MMOL/L (ref 21–28)
HCT VFR BLD AUTO: 37 % (ref 35–47)
HGB BLD-MCNC: 11.6 G/DL (ref 11.7–15.7)
INR PPP: 1.51 (ref 0.86–1.14)
MCH RBC QN AUTO: 28.8 PG (ref 26.5–33)
MCHC RBC AUTO-ENTMCNC: 31.4 G/DL (ref 31.5–36.5)
MCV RBC AUTO: 92 FL (ref 78–100)
O2/TOTAL GAS SETTING VFR VENT: 21 %
OXYHGB MFR BLDV: 45 %
OXYHGB MFR BLDV: 51 %
OXYHGB MFR BLDV: 52 %
OXYHGB MFR BLDV: 54 %
OXYHGB MFR BLDV: 57 %
OXYHGB MFR BLDV: 60 %
OXYHGB MFR BLDV: 61 %
PCO2 BLDV: 47 MM HG (ref 40–50)
PCO2 BLDV: 47 MM HG (ref 40–50)
PCO2 BLDV: 48 MM HG (ref 40–50)
PCO2 BLDV: 52 MM HG (ref 40–50)
PCO2 BLDV: 52 MM HG (ref 40–50)
PCO2 BLDV: 53 MM HG (ref 40–50)
PCO2 BLDV: 53 MM HG (ref 40–50)
PH BLDV: 7.45 PH (ref 7.32–7.43)
PH BLDV: 7.46 PH (ref 7.32–7.43)
PH BLDV: 7.46 PH (ref 7.32–7.43)
PH BLDV: 7.48 PH (ref 7.32–7.43)
PH BLDV: 7.49 PH (ref 7.32–7.43)
PH BLDV: 7.49 PH (ref 7.32–7.43)
PH BLDV: 7.5 PH (ref 7.32–7.43)
PLATELET # BLD AUTO: 163 10E9/L (ref 150–450)
PO2 BLDV: 29 MM HG (ref 25–47)
PO2 BLDV: 31 MM HG (ref 25–47)
PO2 BLDV: 31 MM HG (ref 25–47)
PO2 BLDV: 33 MM HG (ref 25–47)
PO2 BLDV: 35 MM HG (ref 25–47)
POTASSIUM SERPL-SCNC: 2.8 MMOL/L (ref 3.4–5.3)
POTASSIUM SERPL-SCNC: 2.8 MMOL/L (ref 3.4–5.3)
POTASSIUM SERPL-SCNC: 3.6 MMOL/L (ref 3.4–5.3)
PROT SERPL-MCNC: 6.4 G/DL (ref 6.8–8.8)
PROT SERPL-MCNC: 7 G/DL (ref 6.8–8.8)
RBC # BLD AUTO: 4.03 10E12/L (ref 3.8–5.2)
SODIUM SERPL-SCNC: 137 MMOL/L (ref 133–144)
SODIUM SERPL-SCNC: 138 MMOL/L (ref 133–144)
WBC # BLD AUTO: 5.8 10E9/L (ref 4–11)

## 2017-01-17 PROCEDURE — 25000132 ZZH RX MED GY IP 250 OP 250 PS 637: Performed by: INTERNAL MEDICINE

## 2017-01-17 PROCEDURE — 20000004 ZZH R&B ICU UMMC

## 2017-01-17 PROCEDURE — 40000196 ZZH STATISTIC RAPCV CVP MONITORING

## 2017-01-17 PROCEDURE — 84132 ASSAY OF SERUM POTASSIUM: CPT | Performed by: INTERNAL MEDICINE

## 2017-01-17 PROCEDURE — 40000048 ZZH STATISTIC DAILY SWAN MONITORING

## 2017-01-17 PROCEDURE — 82805 BLOOD GASES W/O2 SATURATION: CPT | Performed by: INTERNAL MEDICINE

## 2017-01-17 PROCEDURE — 25000125 ZZHC RX 250: Performed by: STUDENT IN AN ORGANIZED HEALTH CARE EDUCATION/TRAINING PROGRAM

## 2017-01-17 PROCEDURE — 71010 XR CHEST PORT 1 VW: CPT

## 2017-01-17 PROCEDURE — 99291 CRITICAL CARE FIRST HOUR: CPT | Mod: GC | Performed by: INTERNAL MEDICINE

## 2017-01-17 PROCEDURE — 25000125 ZZHC RX 250: Performed by: INTERNAL MEDICINE

## 2017-01-17 PROCEDURE — S0171 BUMETANIDE 0.5 MG: HCPCS | Performed by: INTERNAL MEDICINE

## 2017-01-17 PROCEDURE — 80053 COMPREHEN METABOLIC PANEL: CPT | Performed by: INTERNAL MEDICINE

## 2017-01-17 PROCEDURE — 25000132 ZZH RX MED GY IP 250 OP 250 PS 637

## 2017-01-17 PROCEDURE — 85027 COMPLETE CBC AUTOMATED: CPT | Performed by: INTERNAL MEDICINE

## 2017-01-17 PROCEDURE — 40000275 ZZH STATISTIC RCP TIME EA 10 MIN

## 2017-01-17 PROCEDURE — 85610 PROTHROMBIN TIME: CPT | Performed by: INTERNAL MEDICINE

## 2017-01-17 RX ORDER — HYDRALAZINE HYDROCHLORIDE 50 MG/1
50 TABLET, FILM COATED ORAL 4 TIMES DAILY
Status: DISCONTINUED | OUTPATIENT
Start: 2017-01-18 | End: 2017-01-18

## 2017-01-17 RX ORDER — HYDRALAZINE HYDROCHLORIDE 25 MG/1
25 TABLET, FILM COATED ORAL 4 TIMES DAILY
Status: DISCONTINUED | OUTPATIENT
Start: 2017-01-17 | End: 2017-01-17

## 2017-01-17 RX ORDER — HYDRALAZINE HYDROCHLORIDE 25 MG/1
25 TABLET, FILM COATED ORAL ONCE
Status: COMPLETED | OUTPATIENT
Start: 2017-01-17 | End: 2017-01-18

## 2017-01-17 RX ORDER — POTASSIUM CHLORIDE 750 MG/1
20 TABLET, EXTENDED RELEASE ORAL ONCE
Status: COMPLETED | OUTPATIENT
Start: 2017-01-17 | End: 2017-01-17

## 2017-01-17 RX ORDER — ISOSORBIDE DINITRATE 20 MG/1
20 TABLET ORAL 3 TIMES DAILY
Status: DISCONTINUED | OUTPATIENT
Start: 2017-01-17 | End: 2017-01-17

## 2017-01-17 RX ORDER — ISOSORBIDE DINITRATE 20 MG/1
20 TABLET ORAL ONCE
Status: COMPLETED | OUTPATIENT
Start: 2017-01-17 | End: 2017-01-18

## 2017-01-17 RX ORDER — WARFARIN SODIUM 3 MG/1
3 TABLET ORAL
Status: COMPLETED | OUTPATIENT
Start: 2017-01-17 | End: 2017-01-17

## 2017-01-17 RX ORDER — ISOSORBIDE DINITRATE 20 MG/1
40 TABLET ORAL 3 TIMES DAILY
Status: DISCONTINUED | OUTPATIENT
Start: 2017-01-18 | End: 2017-01-18

## 2017-01-17 RX ADMIN — SODIUM NITROPRUSSIDE 2.5 MCG/KG/MIN: 25 INJECTION, SOLUTION, CONCENTRATE INTRAVENOUS at 01:34

## 2017-01-17 RX ADMIN — DULOXETINE 120 MG: 30 CAPSULE, DELAYED RELEASE ORAL at 07:36

## 2017-01-17 RX ADMIN — HYDROXYZINE HYDROCHLORIDE 50 MG: 50 TABLET, FILM COATED ORAL at 07:37

## 2017-01-17 RX ADMIN — POTASSIUM CHLORIDE 20 MEQ: 29.8 INJECTION, SOLUTION INTRAVENOUS at 14:53

## 2017-01-17 RX ADMIN — HYDROXYZINE HYDROCHLORIDE 50 MG: 50 TABLET, FILM COATED ORAL at 19:54

## 2017-01-17 RX ADMIN — POTASSIUM CHLORIDE 20 MEQ: 29.8 INJECTION, SOLUTION INTRAVENOUS at 13:37

## 2017-01-17 RX ADMIN — Medication 62.5 MCG: at 07:37

## 2017-01-17 RX ADMIN — BUMETANIDE 3 MG: 0.25 INJECTION, SOLUTION INTRAMUSCULAR; INTRAVENOUS at 06:36

## 2017-01-17 RX ADMIN — WARFARIN SODIUM 3 MG: 3 TABLET ORAL at 17:58

## 2017-01-17 RX ADMIN — POTASSIUM CHLORIDE 20 MEQ: 29.8 INJECTION, SOLUTION INTRAVENOUS at 07:38

## 2017-01-17 RX ADMIN — DOBUTAMINE 6 MCG/KG/MIN: 12.5 INJECTION, SOLUTION INTRAVENOUS at 12:35

## 2017-01-17 RX ADMIN — BUMETANIDE 3 MG: 0.25 INJECTION, SOLUTION INTRAMUSCULAR; INTRAVENOUS at 17:51

## 2017-01-17 RX ADMIN — IRON 325 MG: 65 TABLET ORAL at 19:54

## 2017-01-17 RX ADMIN — BUSPIRONE HYDROCHLORIDE 30 MG: 15 TABLET ORAL at 19:54

## 2017-01-17 RX ADMIN — POTASSIUM CHLORIDE 20 MEQ: 29.8 INJECTION, SOLUTION INTRAVENOUS at 06:38

## 2017-01-17 RX ADMIN — HYDRALAZINE HYDROCHLORIDE 25 MG: 25 TABLET ORAL at 16:39

## 2017-01-17 RX ADMIN — PANTOPRAZOLE SODIUM 40 MG: 40 TABLET, DELAYED RELEASE ORAL at 07:36

## 2017-01-17 RX ADMIN — BUSPIRONE HYDROCHLORIDE 30 MG: 15 TABLET ORAL at 07:36

## 2017-01-17 RX ADMIN — BUSPIRONE HYDROCHLORIDE 30 MG: 15 TABLET ORAL at 13:32

## 2017-01-17 RX ADMIN — POTASSIUM CHLORIDE 20 MEQ: 29.8 INJECTION, SOLUTION INTRAVENOUS at 12:35

## 2017-01-17 RX ADMIN — HYDRALAZINE HYDROCHLORIDE 25 MG: 25 TABLET ORAL at 19:54

## 2017-01-17 RX ADMIN — BUMETANIDE 3 MG: 0.25 INJECTION, SOLUTION INTRAMUSCULAR; INTRAVENOUS at 11:12

## 2017-01-17 RX ADMIN — SODIUM NITROPRUSSIDE 2 MCG/KG/MIN: 25 INJECTION, SOLUTION, CONCENTRATE INTRAVENOUS at 12:35

## 2017-01-17 RX ADMIN — SODIUM NITROPRUSSIDE 1.75 MCG/KG/MIN: 25 INJECTION, SOLUTION, CONCENTRATE INTRAVENOUS at 19:00

## 2017-01-17 RX ADMIN — SODIUM NITROPRUSSIDE 2 MCG/KG/MIN: 25 INJECTION, SOLUTION, CONCENTRATE INTRAVENOUS at 07:14

## 2017-01-17 RX ADMIN — ISOSORBIDE DINITRATE 20 MG: 20 TABLET ORAL at 16:39

## 2017-01-17 RX ADMIN — POTASSIUM CHLORIDE 20 MEQ: 29.8 INJECTION, SOLUTION INTRAVENOUS at 19:00

## 2017-01-17 RX ADMIN — HYDROXYZINE HYDROCHLORIDE 50 MG: 50 TABLET, FILM COATED ORAL at 13:32

## 2017-01-17 RX ADMIN — ISOSORBIDE DINITRATE 20 MG: 20 TABLET ORAL at 19:54

## 2017-01-17 RX ADMIN — POTASSIUM CHLORIDE 20 MEQ: 29.8 INJECTION, SOLUTION INTRAVENOUS at 05:31

## 2017-01-17 RX ADMIN — IRON 325 MG: 65 TABLET ORAL at 07:36

## 2017-01-17 RX ADMIN — POTASSIUM CHLORIDE 20 MEQ: 750 TABLET, EXTENDED RELEASE ORAL at 22:26

## 2017-01-17 ASSESSMENT — PAIN DESCRIPTION - DESCRIPTORS: DESCRIPTORS: ACHING

## 2017-01-17 NOTE — PLAN OF CARE
Problem: Goal Outcome Summary  Goal: Goal Outcome Summary  1. Pt will be hemodynamically stable  2. Pt s pain will be managed within stated goal  Outcome: No Change  D- NICM secondary to polysubstance abuse.    I/A-  Current EF was 20-25% on today's ECHO. Dobutamine drip with a set rate was ordered this morning and the milrinone drip was discontinued 2 hours after dobutamine was started. Vasopressin drip has been infusing and titrated for a MAP 65-75 until 1732. At 1732 the nitro drip was ordered to be maintained at a set rate. [place] catheter remains in place with no issues. Blood gases and FINK calculations have been obtained and charted at a minimum of every four hours. Patient remains afebrile. HR has been sinus with occasional PVC's since starting the Dobutamine. Patient also has a prolonged QT interval (MICU team aware of ectopy and QT). Neuro intact and alert and oriented. Last CMP noted a K+3.4, orders were obtained for replacement.    P-Continue hemodynamic monitoring. Continue calculating FINK scores every 4 hours. Replace potassium as soon as medication available.

## 2017-01-17 NOTE — PLAN OF CARE
Problem: Goal Outcome Summary  Goal: Goal Outcome Summary  1. Pt will be hemodynamically stable  2. Pt s pain will be managed within stated goal  Outcome: No Change  D: Afebrile, VSS maintaining MAP goal 65-75 with nipride and dobutamine. Patient on scheduled bumex with good UO.  I/A: K+: 2.8 replacement given will recheck 2 hrs after dose is given.   P: Continue to monitor patient and intervene as needed.

## 2017-01-18 ENCOUNTER — APPOINTMENT (OUTPATIENT)
Dept: GENERAL RADIOLOGY | Facility: CLINIC | Age: 27
DRG: 286 | End: 2017-01-18
Attending: INTERNAL MEDICINE
Payer: COMMERCIAL

## 2017-01-18 LAB
ALBUMIN SERPL-MCNC: 3.4 G/DL (ref 3.4–5)
ALBUMIN SERPL-MCNC: 3.7 G/DL (ref 3.4–5)
ALP SERPL-CCNC: 78 U/L (ref 40–150)
ALP SERPL-CCNC: 84 U/L (ref 40–150)
ALT SERPL W P-5'-P-CCNC: 206 U/L (ref 0–50)
ALT SERPL W P-5'-P-CCNC: 209 U/L (ref 0–50)
ANION GAP SERPL CALCULATED.3IONS-SCNC: 8 MMOL/L (ref 3–14)
AST SERPL W P-5'-P-CCNC: 46 U/L (ref 0–45)
AST SERPL W P-5'-P-CCNC: 49 U/L (ref 0–45)
BASE EXCESS BLDV CALC-SCNC: 11.1 MMOL/L
BASE EXCESS BLDV CALC-SCNC: 11.3 MMOL/L
BASE EXCESS BLDV CALC-SCNC: 12.1 MMOL/L
BASE EXCESS BLDV CALC-SCNC: 12.7 MMOL/L
BASE EXCESS BLDV CALC-SCNC: 8.2 MMOL/L
BASE EXCESS BLDV CALC-SCNC: 8.3 MMOL/L
BASE EXCESS BLDV CALC-SCNC: 8.4 MMOL/L
BASE EXCESS BLDV CALC-SCNC: 9.1 MMOL/L
BILIRUB SERPL-MCNC: 0.8 MG/DL (ref 0.2–1.3)
BILIRUB SERPL-MCNC: 1 MG/DL (ref 0.2–1.3)
BUN SERPL-MCNC: 18 MG/DL (ref 7–30)
CALCIUM SERPL-MCNC: 8.7 MG/DL (ref 8.5–10.1)
CALCIUM SERPL-MCNC: 8.8 MG/DL (ref 8.5–10.1)
CALCIUM SERPL-MCNC: 9 MG/DL (ref 8.5–10.1)
CHLORIDE SERPL-SCNC: 96 MMOL/L (ref 94–109)
CHLORIDE SERPL-SCNC: 96 MMOL/L (ref 94–109)
CHLORIDE SERPL-SCNC: 97 MMOL/L (ref 94–109)
CO2 SERPL-SCNC: 32 MMOL/L (ref 20–32)
CO2 SERPL-SCNC: 33 MMOL/L (ref 20–32)
CO2 SERPL-SCNC: 34 MMOL/L (ref 20–32)
CREAT SERPL-MCNC: 1.02 MG/DL (ref 0.52–1.04)
CREAT SERPL-MCNC: 1.04 MG/DL (ref 0.52–1.04)
CREAT SERPL-MCNC: 1.1 MG/DL (ref 0.52–1.04)
DIGOXIN SERPL-MCNC: 0.6 UG/L (ref 0.5–2)
GFR SERPL CREATININE-BSD FRML MDRD: 61 ML/MIN/1.7M2
GFR SERPL CREATININE-BSD FRML MDRD: 65 ML/MIN/1.7M2
GFR SERPL CREATININE-BSD FRML MDRD: 66 ML/MIN/1.7M2
GLUCOSE SERPL-MCNC: 112 MG/DL (ref 70–99)
GLUCOSE SERPL-MCNC: 95 MG/DL (ref 70–99)
GLUCOSE SERPL-MCNC: 97 MG/DL (ref 70–99)
HCO3 BLDV-SCNC: 33 MMOL/L (ref 21–28)
HCO3 BLDV-SCNC: 34 MMOL/L (ref 21–28)
HCO3 BLDV-SCNC: 36 MMOL/L (ref 21–28)
HCO3 BLDV-SCNC: 36 MMOL/L (ref 21–28)
HCO3 BLDV-SCNC: 37 MMOL/L (ref 21–28)
HCO3 BLDV-SCNC: 37 MMOL/L (ref 21–28)
INR PPP: 1.36 (ref 0.86–1.14)
LACTATE BLD-SCNC: 1.1 MMOL/L (ref 0.7–2.1)
LACTATE BLD-SCNC: 1.4 MMOL/L (ref 0.7–2.1)
LACTATE BLD-SCNC: 1.4 MMOL/L (ref 0.7–2.1)
MAGNESIUM SERPL-MCNC: 1.6 MG/DL (ref 1.6–2.3)
MAGNESIUM SERPL-MCNC: 3.1 MG/DL (ref 1.6–2.3)
MAGNESIUM SERPL-MCNC: 3.4 MG/DL (ref 1.6–2.3)
MAGNESIUM SERPL-MCNC: 3.6 MG/DL (ref 1.6–2.3)
O2/TOTAL GAS SETTING VFR VENT: 21 %
OXYHGB MFR BLDV: 36 %
OXYHGB MFR BLDV: 37 %
OXYHGB MFR BLDV: 38 %
OXYHGB MFR BLDV: 43 %
OXYHGB MFR BLDV: 47 %
OXYHGB MFR BLDV: 49 %
OXYHGB MFR BLDV: 51 %
OXYHGB MFR BLDV: 54 %
PCO2 BLDV: 46 MM HG (ref 40–50)
PCO2 BLDV: 47 MM HG (ref 40–50)
PCO2 BLDV: 48 MM HG (ref 40–50)
PCO2 BLDV: 48 MM HG (ref 40–50)
PCO2 BLDV: 49 MM HG (ref 40–50)
PCO2 BLDV: 50 MM HG (ref 40–50)
PCO2 BLDV: 50 MM HG (ref 40–50)
PCO2 BLDV: 52 MM HG (ref 40–50)
PH BLDV: 7.45 PH (ref 7.32–7.43)
PH BLDV: 7.46 PH (ref 7.32–7.43)
PH BLDV: 7.47 PH (ref 7.32–7.43)
PH BLDV: 7.48 PH (ref 7.32–7.43)
PH BLDV: 7.48 PH (ref 7.32–7.43)
PH BLDV: 7.5 PH (ref 7.32–7.43)
PHOSPHATE SERPL-MCNC: 2.7 MG/DL (ref 2.5–4.5)
PO2 BLDV: 25 MM HG (ref 25–47)
PO2 BLDV: 26 MM HG (ref 25–47)
PO2 BLDV: 27 MM HG (ref 25–47)
PO2 BLDV: 28 MM HG (ref 25–47)
PO2 BLDV: 29 MM HG (ref 25–47)
PO2 BLDV: 30 MM HG (ref 25–47)
PO2 BLDV: 32 MM HG (ref 25–47)
PO2 BLDV: 33 MM HG (ref 25–47)
POTASSIUM SERPL-SCNC: 2.5 MMOL/L (ref 3.4–5.3)
POTASSIUM SERPL-SCNC: 3 MMOL/L (ref 3.4–5.3)
POTASSIUM SERPL-SCNC: 3.2 MMOL/L (ref 3.4–5.3)
POTASSIUM SERPL-SCNC: 3.4 MMOL/L (ref 3.4–5.3)
POTASSIUM SERPL-SCNC: 4.5 MMOL/L (ref 3.4–5.3)
POTASSIUM SERPL-SCNC: 4.7 MMOL/L (ref 3.4–5.3)
PROT SERPL-MCNC: 6.6 G/DL (ref 6.8–8.8)
PROT SERPL-MCNC: 7.2 G/DL (ref 6.8–8.8)
SODIUM SERPL-SCNC: 136 MMOL/L (ref 133–144)
SODIUM SERPL-SCNC: 137 MMOL/L (ref 133–144)
SODIUM SERPL-SCNC: 138 MMOL/L (ref 133–144)

## 2017-01-18 PROCEDURE — 25000132 ZZH RX MED GY IP 250 OP 250 PS 637: Performed by: INTERNAL MEDICINE

## 2017-01-18 PROCEDURE — 85610 PROTHROMBIN TIME: CPT | Performed by: INTERNAL MEDICINE

## 2017-01-18 PROCEDURE — 80048 BASIC METABOLIC PNL TOTAL CA: CPT | Performed by: INTERNAL MEDICINE

## 2017-01-18 PROCEDURE — 84100 ASSAY OF PHOSPHORUS: CPT | Performed by: INTERNAL MEDICINE

## 2017-01-18 PROCEDURE — 71010 XR CHEST PORT 1 VW: CPT

## 2017-01-18 PROCEDURE — 84132 ASSAY OF SERUM POTASSIUM: CPT | Performed by: INTERNAL MEDICINE

## 2017-01-18 PROCEDURE — 40000048 ZZH STATISTIC DAILY SWAN MONITORING

## 2017-01-18 PROCEDURE — 71010 XR CHEST PORT 1 VW: CPT | Mod: 77

## 2017-01-18 PROCEDURE — 40000196 ZZH STATISTIC RAPCV CVP MONITORING

## 2017-01-18 PROCEDURE — 25000125 ZZHC RX 250: Performed by: INTERNAL MEDICINE

## 2017-01-18 PROCEDURE — 93005 ELECTROCARDIOGRAM TRACING: CPT

## 2017-01-18 PROCEDURE — 83735 ASSAY OF MAGNESIUM: CPT | Performed by: INTERNAL MEDICINE

## 2017-01-18 PROCEDURE — 80162 ASSAY OF DIGOXIN TOTAL: CPT | Performed by: INTERNAL MEDICINE

## 2017-01-18 PROCEDURE — 25000132 ZZH RX MED GY IP 250 OP 250 PS 637

## 2017-01-18 PROCEDURE — 80053 COMPREHEN METABOLIC PANEL: CPT | Performed by: INTERNAL MEDICINE

## 2017-01-18 PROCEDURE — 93010 ELECTROCARDIOGRAM REPORT: CPT | Mod: 76 | Performed by: INTERNAL MEDICINE

## 2017-01-18 PROCEDURE — S0171 BUMETANIDE 0.5 MG: HCPCS | Performed by: INTERNAL MEDICINE

## 2017-01-18 PROCEDURE — 25000125 ZZHC RX 250: Performed by: STUDENT IN AN ORGANIZED HEALTH CARE EDUCATION/TRAINING PROGRAM

## 2017-01-18 PROCEDURE — 83605 ASSAY OF LACTIC ACID: CPT | Performed by: INTERNAL MEDICINE

## 2017-01-18 PROCEDURE — 20000004 ZZH R&B ICU UMMC

## 2017-01-18 PROCEDURE — 25000125 ZZHC RX 250

## 2017-01-18 PROCEDURE — 99291 CRITICAL CARE FIRST HOUR: CPT | Mod: GC | Performed by: INTERNAL MEDICINE

## 2017-01-18 PROCEDURE — 25000128 H RX IP 250 OP 636

## 2017-01-18 PROCEDURE — 82805 BLOOD GASES W/O2 SATURATION: CPT | Performed by: INTERNAL MEDICINE

## 2017-01-18 PROCEDURE — 40000275 ZZH STATISTIC RCP TIME EA 10 MIN

## 2017-01-18 PROCEDURE — 25000132 ZZH RX MED GY IP 250 OP 250 PS 637: Performed by: STUDENT IN AN ORGANIZED HEALTH CARE EDUCATION/TRAINING PROGRAM

## 2017-01-18 RX ORDER — POTASSIUM CHLORIDE 20MEQ/15ML
40 LIQUID (ML) ORAL DAILY
Status: DISCONTINUED | OUTPATIENT
Start: 2017-01-18 | End: 2017-01-19

## 2017-01-18 RX ORDER — METOLAZONE 2.5 MG/1
5 TABLET ORAL ONCE
Status: COMPLETED | OUTPATIENT
Start: 2017-01-18 | End: 2017-01-18

## 2017-01-18 RX ORDER — POTASSIUM CHLORIDE 29.8 MG/ML
20 INJECTION INTRAVENOUS
Status: COMPLETED | OUTPATIENT
Start: 2017-01-18 | End: 2017-01-18

## 2017-01-18 RX ORDER — WARFARIN SODIUM 5 MG/1
5 TABLET ORAL
Status: COMPLETED | OUTPATIENT
Start: 2017-01-18 | End: 2017-01-18

## 2017-01-18 RX ORDER — SODIUM CHLORIDE 9 MG/ML
INJECTION, SOLUTION INTRAVENOUS
Status: COMPLETED
Start: 2017-01-18 | End: 2017-01-18

## 2017-01-18 RX ORDER — BUMETANIDE 0.25 MG/ML
2 INJECTION INTRAMUSCULAR; INTRAVENOUS ONCE
Status: COMPLETED | OUTPATIENT
Start: 2017-01-18 | End: 2017-01-18

## 2017-01-18 RX ADMIN — POTASSIUM CHLORIDE 20 MEQ: 29.8 INJECTION, SOLUTION INTRAVENOUS at 19:35

## 2017-01-18 RX ADMIN — Medication 62.5 MCG: at 10:07

## 2017-01-18 RX ADMIN — HYDROXYZINE HYDROCHLORIDE 50 MG: 50 TABLET, FILM COATED ORAL at 10:05

## 2017-01-18 RX ADMIN — POTASSIUM CHLORIDE 20 MEQ: 29.8 INJECTION, SOLUTION INTRAVENOUS at 18:09

## 2017-01-18 RX ADMIN — BUSPIRONE HYDROCHLORIDE 30 MG: 15 TABLET ORAL at 16:34

## 2017-01-18 RX ADMIN — ISOSORBIDE DINITRATE 20 MG: 20 TABLET ORAL at 00:26

## 2017-01-18 RX ADMIN — Medication 1 MG/HR: at 12:03

## 2017-01-18 RX ADMIN — WARFARIN SODIUM 5 MG: 5 TABLET ORAL at 21:49

## 2017-01-18 RX ADMIN — ACETAMINOPHEN 650 MG: 325 TABLET, FILM COATED ORAL at 19:08

## 2017-01-18 RX ADMIN — METOLAZONE 5 MG: 2.5 TABLET ORAL at 10:05

## 2017-01-18 RX ADMIN — SODIUM NITROPRUSSIDE 0.75 MCG/KG/MIN: 25 INJECTION, SOLUTION, CONCENTRATE INTRAVENOUS at 13:45

## 2017-01-18 RX ADMIN — BUMETANIDE 3 MG: 0.25 INJECTION, SOLUTION INTRAMUSCULAR; INTRAVENOUS at 05:33

## 2017-01-18 RX ADMIN — POTASSIUM CHLORIDE 40 MEQ: 750 TABLET, EXTENDED RELEASE ORAL at 16:34

## 2017-01-18 RX ADMIN — BUSPIRONE HYDROCHLORIDE 30 MG: 15 TABLET ORAL at 10:06

## 2017-01-18 RX ADMIN — DOBUTAMINE 5 MCG/KG/MIN: 12.5 INJECTION, SOLUTION INTRAVENOUS at 19:40

## 2017-01-18 RX ADMIN — HYDROXYZINE HYDROCHLORIDE 50 MG: 50 TABLET, FILM COATED ORAL at 19:35

## 2017-01-18 RX ADMIN — HYDRALAZINE HYDROCHLORIDE 25 MG: 25 TABLET ORAL at 00:26

## 2017-01-18 RX ADMIN — PANTOPRAZOLE SODIUM 40 MG: 40 TABLET, DELAYED RELEASE ORAL at 10:05

## 2017-01-18 RX ADMIN — DULOXETINE 120 MG: 30 CAPSULE, DELAYED RELEASE ORAL at 10:07

## 2017-01-18 RX ADMIN — ONDANSETRON 4 MG: 4 TABLET, ORALLY DISINTEGRATING ORAL at 19:08

## 2017-01-18 RX ADMIN — POTASSIUM CHLORIDE 20 MEQ: 29.8 INJECTION, SOLUTION INTRAVENOUS at 05:33

## 2017-01-18 RX ADMIN — ISOSORBIDE DINITRATE 40 MG: 20 TABLET ORAL at 10:06

## 2017-01-18 RX ADMIN — POTASSIUM CHLORIDE 20 MEQ: 29.8 INJECTION, SOLUTION INTRAVENOUS at 15:23

## 2017-01-18 RX ADMIN — IRON 325 MG: 65 TABLET ORAL at 19:35

## 2017-01-18 RX ADMIN — BUSPIRONE HYDROCHLORIDE 30 MG: 15 TABLET ORAL at 19:35

## 2017-01-18 RX ADMIN — HYDRALAZINE HYDROCHLORIDE 50 MG: 50 TABLET ORAL at 10:06

## 2017-01-18 RX ADMIN — BUMETANIDE 2 MG: 0.25 INJECTION, SOLUTION INTRAMUSCULAR; INTRAVENOUS at 11:51

## 2017-01-18 RX ADMIN — POTASSIUM CHLORIDE 20 MEQ: 29.8 INJECTION, SOLUTION INTRAVENOUS at 06:32

## 2017-01-18 RX ADMIN — POTASSIUM CHLORIDE 20 MEQ: 29.8 INJECTION, SOLUTION INTRAVENOUS at 17:14

## 2017-01-18 RX ADMIN — Medication 2 G: at 16:36

## 2017-01-18 RX ADMIN — POTASSIUM CHLORIDE 20 MEQ: 29.8 INJECTION, SOLUTION INTRAVENOUS at 16:12

## 2017-01-18 RX ADMIN — Medication 2 G: at 15:18

## 2017-01-18 RX ADMIN — ACETAMINOPHEN 650 MG: 325 TABLET, FILM COATED ORAL at 10:41

## 2017-01-18 RX ADMIN — IRON 325 MG: 65 TABLET ORAL at 10:06

## 2017-01-18 RX ADMIN — POTASSIUM CHLORIDE 20 MEQ: 29.8 INJECTION, SOLUTION INTRAVENOUS at 18:45

## 2017-01-18 RX ADMIN — SODIUM NITROPRUSSIDE 1.5 MCG/KG/MIN: 25 INJECTION, SOLUTION, CONCENTRATE INTRAVENOUS at 01:06

## 2017-01-18 RX ADMIN — SODIUM CHLORIDE 500 ML: 9 INJECTION, SOLUTION INTRAVENOUS at 19:41

## 2017-01-18 RX ADMIN — ACETAMINOPHEN 650 MG: 325 TABLET, FILM COATED ORAL at 01:08

## 2017-01-18 RX ADMIN — HYDROXYZINE HYDROCHLORIDE 50 MG: 50 TABLET, FILM COATED ORAL at 16:34

## 2017-01-18 NOTE — PLAN OF CARE
Problem: Cardiac Output Decreased (Adult)  Goal: Identify Related Risk Factors and Signs and Symptoms  Related risk factors and signs and symptoms are identified upon initiation of Human Response Clinical Practice Guideline (CPG)  Outcome: No Change  No acute events overnight.  A/O x 4 on RA.  Dobutamine gtt remains at 6 mcg/kg/min and Nitroprusside gtt weaned down to 1 mcg/kg/min.  BP soft w/narrow pulse pressures but MAPs maintaining > 60.  Additional dose of Hydralazine and Isordil given.  AM K+ of 3.0 replaced with 40 mEq IV.  Adequate UOP.  Tylenol given for c/o headache w/improvement.  Ambulated in hallway uneventfully, denied dizziness/lightheadedness.

## 2017-01-18 NOTE — PROGRESS NOTES
"[facility]  Cardiology Service  Daily Note  January 18, 2017      Interval History:   Nursing notes reviewed. She did not feel well this morning and complained of chest congestion. The HD were RA: 18, PA: , PCWP: 30, CI : 1.2 and SVR: 2200. We increased dobutamine to 7.5 mcg/kg/min and nipride to 0.75mcg/kg/min, HD improved: CI 1.6 and SVR 1800. She also had an episode of -170s, with stable BP but felt palpitations. 40meq K and 2 gm Mg was given without improved, amiodarone 74mg x1. Stat labs showed K 2.5 and Mg 1.6 despite replacement. We aggressively replaced lytes and she converted to sinus    Pertinent Medications:  bumex    Dobutamine  Nipride  Digoxin    Hemodynamics:  CVP: 18,  PA: 70/38,  Mean PA: 51,  CI: 1.5,  SVR: 1700    Examination:  BP 94/54 mmHg  Pulse 81  Temp(Src) 98.1  F (36.7  C) (Oral)  Resp 16  Ht 1.6 m (5' 3\")  Wt 69.9 kg (154 lb 1.6 oz)  BMI 27.30 kg/m2  SpO2 97%  GEN: awake, alert, resting comfortably in bed . Tearful this morning  HEENT: no scleral icterus, mucus membranes moist  NECK: Supple, swan in right IJ  RESP: breathing comfortably on RA, good b/l air entry, CTAB  CV: Regular, normal rate, normal S1 and S2, a holosystolic murmur in tricuspid area  ABD: Soft, mildly tender in RUQ w/ no guarding or rigidity, non distended, NABS  EXT: No peripheral edema, warm/well perfused    NEURO: AAO * 3, no gross focal deficits    Data:  CMP  Recent Labs  Lab 01/18/17  0401 01/17/17  1800 01/17/17  1120 01/17/17  0400 01/16/17  1540 01/16/17  0420 01/15/17  1617 01/15/17  0415  01/14/17  0902    137  --  138 140 139 141 137  < > 134   POTASSIUM 3.0* 3.6 2.8* 2.8* 3.4 3.4 3.6 4.0  < > 3.7   CHLORIDE 96 97  --  96 101 101 104 98  < > 95   CO2 33* 33*  --  35* 33* 30 30 33*  < > 30   ANIONGAP 8 8  --  6 6 8 8 6  < > 10   GLC 95 75  --  93 99 90 86 89  < > 75   BUN 18 15  --  13 11 13 13 13  < > 17   CR 1.10* 1.19*  --  1.06* 1.00 0.95 1.04 1.00 "  < > 1.01   GFRESTIMATED 61 56*  --  63 68 72 65 68  < > 67   GFRESTBLACK 74 67  --  77 82 87 78 82  < > 81   MARIO 8.7 8.6  --  8.8 8.0* 8.3* 7.7* 8.4*  < > 8.2*   MAG  --   --   --   --   --  2.0 1.6 1.9  --  2.1   PHOS  --   --   --   --   --  2.6 2.1* 2.6  --  2.2*   PROTTOTAL 6.6* 7.0  --  6.4* 6.3* 6.0* 5.9* 6.1*  < >  --    ALBUMIN 3.4 3.5  --  3.3* 3.1* 3.0* 2.9* 3.1*  < >  --    BILITOTAL 0.8 0.9  --  0.9 0.8 0.7 0.7 1.0  < >  --    ALKPHOS 84 80  --  76 65 67 72 78  < >  --    AST 49* 58*  --  64* 71* 86* 112* 157*  < >  --    * 240*  --  248* 275* 302* 330* 383*  < >  --    < > = values in this interval not displayed.  CBC  Recent Labs  Lab 01/17/17 0400 01/16/17 0420 01/14/17 0403 01/13/17 2003   WBC 5.8 5.6 4.3 5.4   RBC 4.03 3.86 3.96 3.95   HGB 11.6* 11.1* 11.4* 11.5*   HCT 37.0 35.2 35.9 36.0   MCV 92 91 91 91   MCH 28.8 28.8 28.8 29.1   MCHC 31.4* 31.5 31.8 31.9   RDW 15.5* 15.6* 15.3* 15.3*    145* 152 148*     INR  Recent Labs  Lab 01/18/17 0401 01/17/17  0400 01/16/17  0420 01/15/17  0415   INR 1.36* 1.51* 1.55* 2.00*     Arterial Blood Gas  Recent Labs  Lab 01/18/17  0401 01/18/17  0006 01/17/17  2222 01/17/17 2002   O2PER 21 21 21 21       Imaging Studies:  CXR:  IMPRESSION:    Stable position of [place]elle catheter. No acute cardiopulmonary  abnormality.       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    Updates:  - continue diuresis today w/ bumex IV gtt    - titrating off nitroprusside and continuing PO afterload reduction: isordil/ hydralazine  - continue dobutamine gtt  - q2 hr  HD  - Had SVT today, in setting of hypokalemia and hypomagnesemia, converted to sinus with K/ Mg and amiodarone 75mg    # Cardiogenic shock/  NICM secondary to polysubstance abuse  with EF 20-25% s/p ICD:  Improving  NYHA Class IIIb, Stage D.     - dobutamine @ 5-7.5mcg/kg/min  - nipride gtt for afterload reduction, cross-titrating off and up-titrating PO afterload reduction  - continue digoxin 62.5 mcg  - diuresis: bumex gtt, metolazone x1 today  - Q2 hr HD  - strict I/O, daily weights  - f/u K and Mg while diuresing    - 8th admission for heart failure since being diagnosed in 2016. Anne is unable to be considered for advanced heart failure therapies, including LVAD and heart transplant, until she is sober for 1 year (most recent known substance use was marijuana in 2016).  However, in setting of her decompensations, LVAD candidacy will be re-evaluated.  Plan for transfer to [facility] tomorrow    # SVT: Likely short RP tachycardia, intermittent sinus w/ frequent PAC.   Converted to sinus w/ K/Mg replacement and amiodarone 75mg  - due to HypoK and HypoMag. K 2.5 despite 40meq replacement and Mg 1.6 despite 2 gms  - aggressive electrolytes replacement      # Non-infectious aortic valve vegetation (EVAN 2016)    # Gallstones, suspected cholecystitis/ pain is now resolved and she is tolerating oral intake: US and CT showing GB wall thickening and inflammation w/ cholelithiasis  No CBD dilation  - tolerating regular diet now  - NO oxycodone for pain, given hx of addiction  - zofran prn for nausea  - completed 5-day course of ertapenem  - General surgery consulted for lap cholecystectomy vs percutaneous drainage. Given that HIDA scan was negative for cholecystitis (although reduced EF), surgery team recommended holding off on any procedures given high operative risk and questionable improvement in symptoms with the surgery    # Elevated trans-aminases/ improvin/2 gallstones and congestive hepatopathy  -  down-trending, continue to trend bid    # ROBERT on CKD:  baseline Cr is 0.65, started rising 1/12 due to cardiogenic shock (low flow and cardio-renal) , now down trended to 1.0   - strict I/O, daily weights  - f/u Cr and lytes  - avoid nephrotoxins    # Bilateral PE  7/2016  - Warfarin per pharmacy  - Daily INR    # Chronic normocytic anemia  Last iron studies suggest anemia of chronic disease, likely from cardiomyopathy. Hgb at baseline of ~ 11.0  - Continue iron supplements    # Anxiety/Depression:     Given social circumstances surround her current medical status, was visibly upset multiple times during previous admission. Psychiatry saw her on 12/14 and recommended to increase cymbalta and for her to be referred back to her established psychiatrist at discharge with recommendations for follow up with psychotherapist.    - Continue buspar, cymbalta, atarax  - psych consulted for depression, cymbalta increased to 120mg  - NO benzos      FEN: cardiac diet  Prophylaxis: on warfarin for PE in 7/2016  Code Status: full  Disposition: transfer to floor within 1-2 days    Plan of care discussed with Dr. Kaylynn Mixon MD  PGY3 Internal Medicine  [phone]

## 2017-01-18 NOTE — PROGRESS NOTES
"[facility]  Cardiology Service  Daily Note  January 16, 2017      Interval History:   Nursing notes reviewed.   No events overnight, no complaints.    Pertinent Medications:  bumex   Dobutamine  Nipride  Digoxin    Hemodynamics:  CVP: 12,  PA: 47/28,  Mean PA: 33,  CI: 1.7,  SVR: 1640    PCWP: 25    Examination:  BP 95/58 mmHg  Pulse 81  Temp(Src) 98.2  F (36.8  C) (Oral)  Resp 16  Ht 1.6 m (5' 3\")  Wt 69.9 kg (154 lb 1.6 oz)  BMI 27.30 kg/m2  SpO2 98%  GEN: awake, alert, resting comfortably in bed    HEENT: no scleral icterus, mucus membranes moist  NECK: Supple, swan in right IJ  RESP: breathing comfortably on RA, good b/l air entry, CTAB  CV: Regular, normal rate, normal S1 and S2, a holosystolic murmur in tricuspid area  ABD: Soft, non tender, non distended, NABS  EXT: No peripheral edema, warm/well perfused    NEURO: AAO * 3, no gross focal deficits    Data:  CMP  Recent Labs  Lab 01/17/17  1800 01/17/17  1120 01/17/17  0400 01/16/17  1540 01/16/17  0420 01/15/17  1617 01/15/17  0415  01/14/17  0902     --  138 140 139 141 137  < > 134   POTASSIUM 3.6 2.8* 2.8* 3.4 3.4 3.6 4.0  < > 3.7   CHLORIDE 97  --  96 101 101 104 98  < > 95   CO2 33*  --  35* 33* 30 30 33*  < > 30   ANIONGAP 8  --  6 6 8 8 6  < > 10   GLC 75  --  93 99 90 86 89  < > 75   BUN 15  --  13 11 13 13 13  < > 17   CR 1.19*  --  1.06* 1.00 0.95 1.04 1.00  < > 1.01   GFRESTIMATED 56*  --  63 68 72 65 68  < > 67   GFRESTBLACK 67  --  77 82 87 78 82  < > 81   MARIO 8.6  --  8.8 8.0* 8.3* 7.7* 8.4*  < > 8.2*   MAG  --   --   --   --  2.0 1.6 1.9  --  2.1   PHOS  --   --   --   --  2.6 2.1* 2.6  --  2.2*   PROTTOTAL 7.0  --  6.4* 6.3* 6.0* 5.9* 6.1*  < >  --    ALBUMIN 3.5  --  3.3* 3.1* 3.0* 2.9* 3.1*  < >  --    BILITOTAL 0.9  --  0.9 0.8 0.7 0.7 1.0  < >  --    ALKPHOS 80  --  76 65 67 72 78  < >  --    AST 58*  --  64* 71* 86* 112* 157*  < >  --    *  --  248* 275* 302* 330* 383*  < >  --  "   < > = values in this interval not displayed.  CBC    Recent Labs  Lab 01/17/17  0400 01/16/17  0420 01/14/17  0403 01/13/17 2003   WBC 5.8 5.6 4.3 5.4   RBC 4.03 3.86 3.96 3.95   HGB 11.6* 11.1* 11.4* 11.5*   HCT 37.0 35.2 35.9 36.0   MCV 92 91 91 91   MCH 28.8 28.8 28.8 29.1   MCHC 31.4* 31.5 31.8 31.9   RDW 15.5* 15.6* 15.3* 15.3*    145* 152 148*     INR    Recent Labs  Lab 01/17/17  0400 01/16/17  0420 01/15/17  0415 01/14/17  0902   INR 1.51* 1.55* 2.00* 2.61*     Arterial Blood Gas    Recent Labs  Lab 01/17/17 2002 01/17/17  1515 01/17/17  1120 01/17/17  0750   O2PER 21 21 21 21       Imaging Studies:  CXR:  IMPRESSION:    1. Right [place]-Noelle catheter tip in proximal/mid right pulmonary  artery.  2. Stable mild cardiomegaly.    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    Updates:  - continue diuresis today w/ bumex IV 3mg tid.   - cross-titrating off nitroprusside and starting PO afterload reduction    # Cardiogenic shock/  NICM secondary to polysubstance abuse  with EF 20-25% s/p ICD:  Improving  NYHA Class IIIb, Stage D.     - dobutamine @ 6mcg/kg/min  - nipride gtt for afterload reduction, cross-titrating off and up-titrating PO afterload reduction  - milrinone was stopped 2 hrs after initiation of dobutamine  - continue digoxin 62.5 mcg  - diuresis: bumex 3mg IV tid, metolazone x1 today  - Q4 hr HD  - strict I/O, daily weights  - f/u K and Mg while diuresing    - 8th admission for heart failure since being diagnosed in July 2016. Anne is unable to be considered for advanced heart failure therapies,  including LVAD and heart transplant, until she is sober for 1 year (most recent known substance use was marijuana in 2016).  However, in setting of her decompensations, LVAD candidacy will be re-evaluated.    # Non-infectious aortic valve vegetation (EVAN 2016)    # Gallstones, suspected cholecystitis/ pain is now resolved and she is tolerating oral intake: US and CT showing GB wall thickening and inflammation w/ cholelithiasis  No CBD dilation  - tolerating regular diet now  - NO oxycodone for pain, given hx of addiction  - zofran prn for nausea  - completed 5-day course of ertapenem  - General surgery consulted for lap cholecystectomy vs percutaneous drainage. Given that HIDA scan was negative for cholecystitis (although reduced EF), surgery team recommended holding off on any procedures given high operative risk and questionable improvement in symptoms with the surgery    # Elevated trans-aminases/ improvin/2 gallstones and congestive hepatopathy  - down-trending, continue to trend bid    # ROBERT on CKD:  baseline Cr is 0.65, started rising  due to cardiogenic shock (low flow and cardio-renal) , now down trending to 0.95  - strict I/O, daily weights  - f/u Cr and lytes  - avoid nephrotoxins    # Bilateral PE  2016  - Warfarin per pharmacy  - Daily INR    # Chronic normocytic anemia  Last iron studies suggest anemia of chronic disease, likely from cardiomyopathy. Hgb at baseline of ~ 11.0  - Continue iron supplements    # Anxiety/Depression:     Given social circumstances surround her current medical status, was visibly upset multiple times during previous admission. Psychiatry saw her on  and recommended to increase cymbalta and for her to be referred back to her established psychiatrist at discharge with recommendations for follow up with psychotherapist.    - Continue buspar, cymbalta, atarax  - psych consulted for depression, cymbalta increased to 120mg  - NO benzos      FEN: cardiac  diet  Prophylaxis: on warfarin for PE in 7/2016  Code Status: full  Disposition: transfer to floor today    Plan of care discussed with Dr. Aroldo Harris MD  Cardiology Fellow

## 2017-01-18 NOTE — PLAN OF CARE
"Problem: Patient Care Overview (Adult)  Goal: Plan of Care Review      15:10--Drawing VBG and pt's HR went up to 155 (ST w/ freq PVC's). Notified Cards II Team, who arrived quickly and examined pt. Stat EKG and stat lytes labs ordered. Pt c/o \"fluttering\" in chest. Mg 2 g IV ordered and given, KCl 20 meq in 50 ml         "

## 2017-01-18 NOTE — PLAN OF CARE
"Problem: Patient Care Overview (Adult)  Goal: Plan of Care Review    Pt c/o chest pain \"7/10\", described as constant. Notified Cards II Team, who arrived at bedside and examined pt. EKG and CXR were ordered. Tylenol 650 mg PO was given. Also sent labs: Potassium, Lactic Acid, and VBG.            "

## 2017-01-18 NOTE — PROGRESS NOTES
Care Coordinator Progress Note     Admission Date/Time:  1/6/2017  Attending MD:  Elvira Storey MD     Data  Chart reviewed, discussed with interdisciplinary team.   Patient was admitted for:    Cardiogenic shock (H)  Nonischemic cardiomyopathy (H)  Chronic systolic heart failure (H).      Coordination of Care and Referrals:   [phone] to check pt insurance coverage for her transfer to [facility] for the second opinion.  CC talked to Libby.  Libby stated according the pt insurance policy if pt transfer is medically necessary they will cover it.  The hospital, [facility] is in their net work and once pt admitted to their facility they just need to send to the insurance inpt notification.  No prior authorization need.  Libby lizeth stated no prior authorization need for non emergency transport and we can arrange the transport without prior auth and transfer her.  Libby stated the case number for my phone call is # [id number].  The above info have been shared with cards 2, Dr. Mixon.  Dr. Mixon stated pt is not stable at this time and will continue to assess her tonight and might be able transfer her tomorrow, 1/19.  CC informed Dr. Mixon the accepting provider need to request bed at his facility and the hospital need to accept her too prior we transfer her.     Assessment  Cards 2 team, Dr. Mixon reported pt would like to be transfer to [facility] for second opinion for LVAD evaluation.  Pt is not candidate for our LVAD program and [facility], Dr. Quach has accepted her for the second opinion.  The team request to check if her insurance will cover her transfer.  Most insurance covers only transfer for higher level of care.     Plan  Anticipated Discharge Date:  TBD.  Anticipated Discharge Plan:  Transfer to [facility] for second opinion for LVAD eval, if pt is stable to be transfer.  CC will cont to follow plan of care.      Alonso Teixeira, RN, BSN  4A and 4E/  ICU  Care Coordinator  Phone: [phone]  Pager: [phone]

## 2017-01-18 NOTE — PLAN OF CARE
Problem: Goal Outcome Summary  Goal: Goal Outcome Summary  1. Pt will be hemodynamically stable  2. Pt s pain will be managed within stated goal  Outcome: No Change  D-NICM     A/P-[place] catheter remains in place. Nitpride and Dobutamine drip remain at set rates. KRYSTLE calculations every four hours have been obtained and relayed to the Cards team. Hypokalemic today and received 60 mEq of Potassium IV after morning potassium check. Afebrile. Sinus rhythm with occasional PVCs noted. No pain or nausea noted today. 1800 Potassium check 3.6.    P-Continue KRYSTLE calculations every four hours. Continue with the Nipride and Dobutamine drips as ordered. Replace 1800 Potassium with 20mEQ

## 2017-01-19 ENCOUNTER — APPOINTMENT (OUTPATIENT)
Dept: GENERAL RADIOLOGY | Facility: CLINIC | Age: 27
DRG: 286 | End: 2017-01-19
Attending: INTERNAL MEDICINE
Payer: COMMERCIAL

## 2017-01-19 LAB
ALBUMIN SERPL-MCNC: 3.6 G/DL (ref 3.4–5)
ALBUMIN SERPL-MCNC: 3.8 G/DL (ref 3.4–5)
ALP SERPL-CCNC: 76 U/L (ref 40–150)
ALP SERPL-CCNC: 79 U/L (ref 40–150)
ALT SERPL W P-5'-P-CCNC: 158 U/L (ref 0–50)
ALT SERPL W P-5'-P-CCNC: 178 U/L (ref 0–50)
ANION GAP SERPL CALCULATED.3IONS-SCNC: 14 MMOL/L (ref 3–14)
ANION GAP SERPL CALCULATED.3IONS-SCNC: 8 MMOL/L (ref 3–14)
ANION GAP SERPL CALCULATED.3IONS-SCNC: 9 MMOL/L (ref 3–14)
AST SERPL W P-5'-P-CCNC: 35 U/L (ref 0–45)
AST SERPL W P-5'-P-CCNC: 38 U/L (ref 0–45)
BASE EXCESS BLDV CALC-SCNC: 6.4 MMOL/L
BASE EXCESS BLDV CALC-SCNC: 6.4 MMOL/L
BASE EXCESS BLDV CALC-SCNC: 6.7 MMOL/L
BASE EXCESS BLDV CALC-SCNC: 6.8 MMOL/L
BASE EXCESS BLDV CALC-SCNC: 6.8 MMOL/L
BASE EXCESS BLDV CALC-SCNC: 7 MMOL/L
BASE EXCESS BLDV CALC-SCNC: 7.2 MMOL/L
BASE EXCESS BLDV CALC-SCNC: 7.8 MMOL/L
BILIRUB SERPL-MCNC: 0.8 MG/DL (ref 0.2–1.3)
BILIRUB SERPL-MCNC: 0.9 MG/DL (ref 0.2–1.3)
BUN SERPL-MCNC: 24 MG/DL (ref 7–30)
BUN SERPL-MCNC: 24 MG/DL (ref 7–30)
BUN SERPL-MCNC: 25 MG/DL (ref 7–30)
BUN SERPL-MCNC: 25 MG/DL (ref 7–30)
BUN SERPL-MCNC: 27 MG/DL (ref 7–30)
CALCIUM SERPL-MCNC: 8.7 MG/DL (ref 8.5–10.1)
CALCIUM SERPL-MCNC: 8.8 MG/DL (ref 8.5–10.1)
CALCIUM SERPL-MCNC: 9 MG/DL (ref 8.5–10.1)
CALCIUM SERPL-MCNC: 9.2 MG/DL (ref 8.5–10.1)
CALCIUM SERPL-MCNC: 9.2 MG/DL (ref 8.5–10.1)
CHLORIDE SERPL-SCNC: 100 MMOL/L (ref 94–109)
CHLORIDE SERPL-SCNC: 100 MMOL/L (ref 94–109)
CHLORIDE SERPL-SCNC: 97 MMOL/L (ref 94–109)
CHLORIDE SERPL-SCNC: 98 MMOL/L (ref 94–109)
CHLORIDE SERPL-SCNC: 98 MMOL/L (ref 94–109)
CK SERPL-CCNC: 46 U/L (ref 30–225)
CO2 SERPL-SCNC: 24 MMOL/L (ref 20–32)
CO2 SERPL-SCNC: 28 MMOL/L (ref 20–32)
CO2 SERPL-SCNC: 29 MMOL/L (ref 20–32)
CO2 SERPL-SCNC: 30 MMOL/L (ref 20–32)
CO2 SERPL-SCNC: 30 MMOL/L (ref 20–32)
CREAT SERPL-MCNC: 1.3 MG/DL (ref 0.52–1.04)
CREAT SERPL-MCNC: 1.32 MG/DL (ref 0.52–1.04)
CREAT SERPL-MCNC: 1.33 MG/DL (ref 0.52–1.04)
CREAT SERPL-MCNC: 1.37 MG/DL (ref 0.52–1.04)
CREAT SERPL-MCNC: 1.56 MG/DL (ref 0.52–1.04)
ERYTHROCYTE [DISTWIDTH] IN BLOOD BY AUTOMATED COUNT: 15.3 % (ref 10–15)
GFR SERPL CREATININE-BSD FRML MDRD: 41 ML/MIN/1.7M2
GFR SERPL CREATININE-BSD FRML MDRD: 47 ML/MIN/1.7M2
GFR SERPL CREATININE-BSD FRML MDRD: 49 ML/MIN/1.7M2
GFR SERPL CREATININE-BSD FRML MDRD: 49 ML/MIN/1.7M2
GFR SERPL CREATININE-BSD FRML MDRD: 50 ML/MIN/1.7M2
GLUCOSE SERPL-MCNC: 107 MG/DL (ref 70–99)
GLUCOSE SERPL-MCNC: 113 MG/DL (ref 70–99)
GLUCOSE SERPL-MCNC: 87 MG/DL (ref 70–99)
GLUCOSE SERPL-MCNC: 99 MG/DL (ref 70–99)
GLUCOSE SERPL-MCNC: 99 MG/DL (ref 70–99)
HCO3 BLDV-SCNC: 31 MMOL/L (ref 21–28)
HCO3 BLDV-SCNC: 31 MMOL/L (ref 21–28)
HCO3 BLDV-SCNC: 32 MMOL/L (ref 21–28)
HCO3 BLDV-SCNC: 33 MMOL/L (ref 21–28)
HCT VFR BLD AUTO: 38.8 % (ref 35–47)
HGB BLD-MCNC: 12.5 G/DL (ref 11.7–15.7)
INR PPP: 1.54 (ref 0.86–1.14)
INTERPRETATION ECG - MUSE: NORMAL
INTERPRETATION ECG - MUSE: NORMAL
LACTATE BLD-SCNC: 1.4 MMOL/L (ref 0.7–2.1)
LACTATE BLD-SCNC: 1.5 MMOL/L (ref 0.7–2.1)
LACTATE BLD-SCNC: 1.7 MMOL/L (ref 0.7–2.1)
LACTATE BLD-SCNC: 1.8 MMOL/L (ref 0.7–2.1)
MAGNESIUM SERPL-MCNC: 2 MG/DL (ref 1.6–2.3)
MAGNESIUM SERPL-MCNC: 2.2 MG/DL (ref 1.6–2.3)
MAGNESIUM SERPL-MCNC: 2.4 MG/DL (ref 1.6–2.3)
MAGNESIUM SERPL-MCNC: 2.5 MG/DL (ref 1.6–2.3)
MAGNESIUM SERPL-MCNC: 2.6 MG/DL (ref 1.6–2.3)
MAGNESIUM SERPL-MCNC: 2.9 MG/DL (ref 1.6–2.3)
MCH RBC QN AUTO: 28.8 PG (ref 26.5–33)
MCHC RBC AUTO-ENTMCNC: 32.2 G/DL (ref 31.5–36.5)
MCV RBC AUTO: 89 FL (ref 78–100)
O2/TOTAL GAS SETTING VFR VENT: 21 %
OXYHGB MFR BLDV: 36 %
OXYHGB MFR BLDV: 37 %
OXYHGB MFR BLDV: 41 %
OXYHGB MFR BLDV: 46 %
OXYHGB MFR BLDV: 54 %
OXYHGB MFR BLDV: 55 %
OXYHGB MFR BLDV: 57 %
OXYHGB MFR BLDV: 59 %
PCO2 BLDV: 42 MM HG (ref 40–50)
PCO2 BLDV: 44 MM HG (ref 40–50)
PCO2 BLDV: 46 MM HG (ref 40–50)
PCO2 BLDV: 48 MM HG (ref 40–50)
PCO2 BLDV: 49 MM HG (ref 40–50)
PCO2 BLDV: 50 MM HG (ref 40–50)
PH BLDV: 7.42 PH (ref 7.32–7.43)
PH BLDV: 7.43 PH (ref 7.32–7.43)
PH BLDV: 7.43 PH (ref 7.32–7.43)
PH BLDV: 7.44 PH (ref 7.32–7.43)
PH BLDV: 7.46 PH (ref 7.32–7.43)
PH BLDV: 7.48 PH (ref 7.32–7.43)
PHOSPHATE SERPL-MCNC: 4.1 MG/DL (ref 2.5–4.5)
PLATELET # BLD AUTO: 218 10E9/L (ref 150–450)
PO2 BLDV: 26 MM HG (ref 25–47)
PO2 BLDV: 27 MM HG (ref 25–47)
PO2 BLDV: 28 MM HG (ref 25–47)
PO2 BLDV: 30 MM HG (ref 25–47)
PO2 BLDV: 33 MM HG (ref 25–47)
PO2 BLDV: 34 MM HG (ref 25–47)
PO2 BLDV: 34 MM HG (ref 25–47)
PO2 BLDV: 35 MM HG (ref 25–47)
POTASSIUM SERPL-SCNC: 3.3 MMOL/L (ref 3.4–5.3)
POTASSIUM SERPL-SCNC: 3.6 MMOL/L (ref 3.4–5.3)
POTASSIUM SERPL-SCNC: 3.7 MMOL/L (ref 3.4–5.3)
POTASSIUM SERPL-SCNC: 4 MMOL/L (ref 3.4–5.3)
PROT SERPL-MCNC: 7.1 G/DL (ref 6.8–8.8)
PROT SERPL-MCNC: 7.2 G/DL (ref 6.8–8.8)
RBC # BLD AUTO: 4.34 10E12/L (ref 3.8–5.2)
SODIUM SERPL-SCNC: 134 MMOL/L (ref 133–144)
SODIUM SERPL-SCNC: 135 MMOL/L (ref 133–144)
SODIUM SERPL-SCNC: 136 MMOL/L (ref 133–144)
SODIUM SERPL-SCNC: 138 MMOL/L (ref 133–144)
SODIUM SERPL-SCNC: 138 MMOL/L (ref 133–144)
WBC # BLD AUTO: 6.5 10E9/L (ref 4–11)

## 2017-01-19 PROCEDURE — 20000004 ZZH R&B ICU UMMC

## 2017-01-19 PROCEDURE — 82550 ASSAY OF CK (CPK): CPT | Performed by: INTERNAL MEDICINE

## 2017-01-19 PROCEDURE — 85610 PROTHROMBIN TIME: CPT | Performed by: INTERNAL MEDICINE

## 2017-01-19 PROCEDURE — 84430 ASSAY OF THIOCYANATE: CPT | Performed by: INTERNAL MEDICINE

## 2017-01-19 PROCEDURE — 25000125 ZZHC RX 250: Performed by: INTERNAL MEDICINE

## 2017-01-19 PROCEDURE — 84132 ASSAY OF SERUM POTASSIUM: CPT | Performed by: INTERNAL MEDICINE

## 2017-01-19 PROCEDURE — S0171 BUMETANIDE 0.5 MG: HCPCS | Performed by: STUDENT IN AN ORGANIZED HEALTH CARE EDUCATION/TRAINING PROGRAM

## 2017-01-19 PROCEDURE — 25000132 ZZH RX MED GY IP 250 OP 250 PS 637: Performed by: INTERNAL MEDICINE

## 2017-01-19 PROCEDURE — 25000125 ZZHC RX 250: Performed by: STUDENT IN AN ORGANIZED HEALTH CARE EDUCATION/TRAINING PROGRAM

## 2017-01-19 PROCEDURE — 83735 ASSAY OF MAGNESIUM: CPT | Performed by: INTERNAL MEDICINE

## 2017-01-19 PROCEDURE — 80053 COMPREHEN METABOLIC PANEL: CPT | Performed by: INTERNAL MEDICINE

## 2017-01-19 PROCEDURE — 71010 XR CHEST PORT 1 VW: CPT

## 2017-01-19 PROCEDURE — 40000196 ZZH STATISTIC RAPCV CVP MONITORING

## 2017-01-19 PROCEDURE — 82805 BLOOD GASES W/O2 SATURATION: CPT | Performed by: INTERNAL MEDICINE

## 2017-01-19 PROCEDURE — S0171 BUMETANIDE 0.5 MG: HCPCS | Performed by: INTERNAL MEDICINE

## 2017-01-19 PROCEDURE — 40000048 ZZH STATISTIC DAILY SWAN MONITORING

## 2017-01-19 PROCEDURE — 83605 ASSAY OF LACTIC ACID: CPT | Performed by: INTERNAL MEDICINE

## 2017-01-19 PROCEDURE — 80048 BASIC METABOLIC PNL TOTAL CA: CPT | Performed by: INTERNAL MEDICINE

## 2017-01-19 PROCEDURE — 84100 ASSAY OF PHOSPHORUS: CPT | Performed by: INTERNAL MEDICINE

## 2017-01-19 PROCEDURE — 25000132 ZZH RX MED GY IP 250 OP 250 PS 637: Performed by: STUDENT IN AN ORGANIZED HEALTH CARE EDUCATION/TRAINING PROGRAM

## 2017-01-19 PROCEDURE — 25000128 H RX IP 250 OP 636: Performed by: INTERNAL MEDICINE

## 2017-01-19 PROCEDURE — 25000132 ZZH RX MED GY IP 250 OP 250 PS 637

## 2017-01-19 PROCEDURE — 40000275 ZZH STATISTIC RCP TIME EA 10 MIN

## 2017-01-19 PROCEDURE — 99291 CRITICAL CARE FIRST HOUR: CPT | Mod: GC | Performed by: INTERNAL MEDICINE

## 2017-01-19 PROCEDURE — 85027 COMPLETE CBC AUTOMATED: CPT | Performed by: INTERNAL MEDICINE

## 2017-01-19 RX ORDER — HYDRALAZINE HYDROCHLORIDE 25 MG/1
25 TABLET, FILM COATED ORAL ONCE
Status: COMPLETED | OUTPATIENT
Start: 2017-01-19 | End: 2017-01-19

## 2017-01-19 RX ORDER — POTASSIUM CHLORIDE 20MEQ/15ML
40 LIQUID (ML) ORAL 2 TIMES DAILY
Status: DISCONTINUED | OUTPATIENT
Start: 2017-01-19 | End: 2017-01-19

## 2017-01-19 RX ORDER — ISOSORBIDE DINITRATE 20 MG/1
40 TABLET ORAL 3 TIMES DAILY
Status: DISCONTINUED | OUTPATIENT
Start: 2017-01-19 | End: 2017-01-19

## 2017-01-19 RX ORDER — BUMETANIDE 0.25 MG/ML
3 INJECTION INTRAMUSCULAR; INTRAVENOUS ONCE
Status: COMPLETED | OUTPATIENT
Start: 2017-01-19 | End: 2017-01-19

## 2017-01-19 RX ORDER — POTASSIUM CHLORIDE 750 MG/1
40 TABLET, EXTENDED RELEASE ORAL 2 TIMES DAILY
Status: DISCONTINUED | OUTPATIENT
Start: 2017-01-19 | End: 2017-01-20 | Stop reason: HOSPADM

## 2017-01-19 RX ORDER — WARFARIN SODIUM 5 MG/1
5 TABLET ORAL
Status: COMPLETED | OUTPATIENT
Start: 2017-01-19 | End: 2017-01-19

## 2017-01-19 RX ORDER — CHLOROTHIAZIDE SODIUM 500 MG/1
500 INJECTION INTRAVENOUS ONCE
Status: COMPLETED | OUTPATIENT
Start: 2017-01-19 | End: 2017-01-19

## 2017-01-19 RX ORDER — DIPHENHYDRAMINE HYDROCHLORIDE 50 MG/ML
50 INJECTION INTRAMUSCULAR; INTRAVENOUS ONCE
Status: COMPLETED | OUTPATIENT
Start: 2017-01-19 | End: 2017-01-19

## 2017-01-19 RX ORDER — CYCLOBENZAPRINE HCL 5 MG
10 TABLET ORAL 3 TIMES DAILY PRN
Status: DISCONTINUED | OUTPATIENT
Start: 2017-01-19 | End: 2017-01-20 | Stop reason: HOSPADM

## 2017-01-19 RX ORDER — HYDRALAZINE HYDROCHLORIDE 50 MG/1
100 TABLET, FILM COATED ORAL 4 TIMES DAILY
Status: DISCONTINUED | OUTPATIENT
Start: 2017-01-19 | End: 2017-01-20 | Stop reason: HOSPADM

## 2017-01-19 RX ORDER — GRANISETRON HYDROCHLORIDE 1 MG/ML
1 INJECTION INTRAVENOUS EVERY 12 HOURS PRN
Status: DISCONTINUED | OUTPATIENT
Start: 2017-01-19 | End: 2017-01-20 | Stop reason: HOSPADM

## 2017-01-19 RX ORDER — CYCLOBENZAPRINE HCL 5 MG
10 TABLET ORAL ONCE
Status: COMPLETED | OUTPATIENT
Start: 2017-01-19 | End: 2017-01-19

## 2017-01-19 RX ADMIN — SODIUM NITROPRUSSIDE 0.75 MCG/KG/MIN: 25 INJECTION, SOLUTION, CONCENTRATE INTRAVENOUS at 05:35

## 2017-01-19 RX ADMIN — HYDRALAZINE HYDROCHLORIDE 25 MG: 25 TABLET ORAL at 16:56

## 2017-01-19 RX ADMIN — POTASSIUM CHLORIDE 40 MEQ: 750 TABLET, EXTENDED RELEASE ORAL at 07:50

## 2017-01-19 RX ADMIN — HYDROXYZINE HYDROCHLORIDE 50 MG: 50 TABLET, FILM COATED ORAL at 14:43

## 2017-01-19 RX ADMIN — PROCHLORPERAZINE EDISYLATE 10 MG: 5 INJECTION INTRAMUSCULAR; INTRAVENOUS at 20:11

## 2017-01-19 RX ADMIN — POTASSIUM CHLORIDE 20 MEQ: 29.8 INJECTION, SOLUTION INTRAVENOUS at 04:24

## 2017-01-19 RX ADMIN — WARFARIN SODIUM 5 MG: 5 TABLET ORAL at 17:04

## 2017-01-19 RX ADMIN — ACETAMINOPHEN 650 MG: 325 TABLET, FILM COATED ORAL at 16:26

## 2017-01-19 RX ADMIN — POTASSIUM CHLORIDE 20 MEQ: 29.8 INJECTION, SOLUTION INTRAVENOUS at 20:52

## 2017-01-19 RX ADMIN — ISOSORBIDE DINITRATE 30 MG: 10 TABLET ORAL at 14:43

## 2017-01-19 RX ADMIN — Medication 2 G: at 21:52

## 2017-01-19 RX ADMIN — ACETAMINOPHEN 650 MG: 325 TABLET, FILM COATED ORAL at 10:24

## 2017-01-19 RX ADMIN — SODIUM NITROPRUSSIDE 0.5 MCG/KG/MIN: 25 INJECTION, SOLUTION, CONCENTRATE INTRAVENOUS at 16:59

## 2017-01-19 RX ADMIN — CYCLOBENZAPRINE HYDROCHLORIDE 10 MG: 5 TABLET, FILM COATED ORAL at 16:26

## 2017-01-19 RX ADMIN — Medication 25 MG: at 01:00

## 2017-01-19 RX ADMIN — IRON 325 MG: 65 TABLET ORAL at 19:37

## 2017-01-19 RX ADMIN — CHLOROTHIAZIDE SODIUM 500 MG: 500 INJECTION, POWDER, LYOPHILIZED, FOR SOLUTION INTRAVENOUS at 09:04

## 2017-01-19 RX ADMIN — ISOSORBIDE DINITRATE 30 MG: 10 TABLET ORAL at 19:37

## 2017-01-19 RX ADMIN — ISOSORBIDE DINITRATE 30 MG: 10 TABLET ORAL at 10:27

## 2017-01-19 RX ADMIN — BUSPIRONE HYDROCHLORIDE 30 MG: 15 TABLET ORAL at 14:43

## 2017-01-19 RX ADMIN — BUSPIRONE HYDROCHLORIDE 30 MG: 15 TABLET ORAL at 19:37

## 2017-01-19 RX ADMIN — DIPHENHYDRAMINE HYDROCHLORIDE 50 MG: 50 INJECTION, SOLUTION INTRAMUSCULAR; INTRAVENOUS at 20:11

## 2017-01-19 RX ADMIN — POTASSIUM CHLORIDE 20 MEQ: 29.8 INJECTION, SOLUTION INTRAVENOUS at 14:42

## 2017-01-19 RX ADMIN — BUMETANIDE 3 MG: 0.25 INJECTION, SOLUTION INTRAMUSCULAR; INTRAVENOUS at 01:00

## 2017-01-19 RX ADMIN — BUSPIRONE HYDROCHLORIDE 30 MG: 15 TABLET ORAL at 07:48

## 2017-01-19 RX ADMIN — HYDRALAZINE HYDROCHLORIDE 75 MG: 50 TABLET ORAL at 16:26

## 2017-01-19 RX ADMIN — POTASSIUM CHLORIDE 40 MEQ: 750 TABLET, EXTENDED RELEASE ORAL at 19:37

## 2017-01-19 RX ADMIN — POTASSIUM CHLORIDE 20 MEQ: 29.8 INJECTION, SOLUTION INTRAVENOUS at 13:26

## 2017-01-19 RX ADMIN — Medication 1 MG/HR: at 15:01

## 2017-01-19 RX ADMIN — GRANISETRON HYDROCHLORIDE 1 MG: 1 INJECTION INTRAVENOUS at 22:35

## 2017-01-19 RX ADMIN — POTASSIUM CHLORIDE 20 MEQ: 29.8 INJECTION, SOLUTION INTRAVENOUS at 02:25

## 2017-01-19 RX ADMIN — POTASSIUM CHLORIDE 20 MEQ: 29.8 INJECTION, SOLUTION INTRAVENOUS at 06:25

## 2017-01-19 RX ADMIN — DOBUTAMINE 7.5 MCG/KG/MIN: 12.5 INJECTION, SOLUTION INTRAVENOUS at 16:56

## 2017-01-19 RX ADMIN — Medication 62.5 MCG: at 10:24

## 2017-01-19 RX ADMIN — PANTOPRAZOLE SODIUM 40 MG: 40 TABLET, DELAYED RELEASE ORAL at 07:48

## 2017-01-19 RX ADMIN — ACETAMINOPHEN 650 MG: 325 TABLET, FILM COATED ORAL at 03:41

## 2017-01-19 RX ADMIN — CYCLOBENZAPRINE HYDROCHLORIDE 10 MG: 5 TABLET, FILM COATED ORAL at 07:49

## 2017-01-19 RX ADMIN — HYDROXYZINE HYDROCHLORIDE 50 MG: 50 TABLET, FILM COATED ORAL at 07:48

## 2017-01-19 RX ADMIN — HYDRALAZINE HYDROCHLORIDE 75 MG: 50 TABLET ORAL at 10:24

## 2017-01-19 RX ADMIN — POTASSIUM CHLORIDE 20 MEQ: 29.8 INJECTION, SOLUTION INTRAVENOUS at 01:00

## 2017-01-19 RX ADMIN — BUMETANIDE 3 MG: 0.25 INJECTION, SOLUTION INTRAMUSCULAR; INTRAVENOUS at 16:26

## 2017-01-19 RX ADMIN — HYDROXYZINE HYDROCHLORIDE 50 MG: 50 TABLET, FILM COATED ORAL at 19:38

## 2017-01-19 RX ADMIN — HYDRALAZINE HYDROCHLORIDE 100 MG: 50 TABLET ORAL at 19:38

## 2017-01-19 RX ADMIN — DULOXETINE 120 MG: 30 CAPSULE, DELAYED RELEASE ORAL at 07:49

## 2017-01-19 RX ADMIN — IRON 325 MG: 65 TABLET ORAL at 07:48

## 2017-01-19 ASSESSMENT — PAIN DESCRIPTION - DESCRIPTORS
DESCRIPTORS: ACHING
DESCRIPTORS: ACHING

## 2017-01-19 NOTE — PROGRESS NOTES
"[facility]  Cardiology Service  Daily Note  January 19, 2017      Interval History:   Nursing notes reviewed. She complains of generalized aches responded to tylenol, tramadol and flexeril. Her CI was 1.2 this morning w/ SVR 2200 and SvO2 36%. We increased the nipride from 1 to 1.25mcg/kg/mi and gave a dose of diuril for CVP elevation to 18. She responded to these interventions, CI improved to 1.8, SVR to 1500 and svo2 to 54%. We restarted oral afterload reduction with hydralazine and isordil with plan to titrate down the nipride. She will be transferring to the [facility] cardiac ICU tomorrow.     Pertinent Medications:  bumex    Dobutamine  Nipride  Digoxin  Hydralazine  Isordil    Hemodynamics:  CVP: 16,  PA: 53/26,   PCW: 24,  CO/CI: 3.1/1.8,  SVR: 1574    Examination:  BP 94/59 mmHg  Pulse 81  Temp(Src) 97.6  F (36.4  C) (Oral)  Resp 17  Ht 1.6 m (5' 3\")  Wt 69.9 kg (154 lb 1.6 oz)  BMI 27.30 kg/m2  SpO2 96%  GEN: awake, alert, resting comfortably in bed   HEENT: no scleral icterus, mucus membranes moist  NECK: Supple, swan in right IJ  RESP: breathing comfortably on RA, good b/l air entry, CTAB  CV: Regular, normal rate, normal S1 and S2, a holosystolic murmur in tricuspid and mitral area  ABD: Soft, non tender, non distended, NABS  EXT: No peripheral edema, warm/well perfused    NEURO: AAO * 3, no gross focal deficits    Data:  CMP  Recent Labs  Lab 01/19/17  0543 01/19/17  0335 01/19/17  0150 01/18/17  2354  01/18/17  1740 01/18/17  1510  01/18/17  0401 01/17/17  1800  01/16/17  0420 01/15/17  1617 01/15/17  0415   NA  --  138  --   --   --  137 138  --  136 137  < > 139 141 137   POTASSIUM 4.0 4.0 4.0 3.7  < > 3.4 2.5*  < > 3.0* 3.6  < > 3.4 3.6 4.0   CHLORIDE  --  100  --   --   --  97 96  --  96 97  < > 101 104 98   CO2  --  29  --   --   --  32 34*  --  33* 33*  < > 30 30 33*   ANIONGAP  --  9  --   --   --  8 8  --  8 8  < > 8 8 6   GLC  --  107*  --   --   --  97 " 112*  --  95 75  < > 90 86 89   BUN  --  24  --   --   --  18 18  --  18 15  < > 13 13 13   CR  --  1.37*  --   --   --  1.04 1.02  --  1.10* 1.19*  < > 0.95 1.04 1.00   GFRESTIMATED  --  47*  --   --   --  65 66  --  61 56*  < > 72 65 68   GFRESTBLACK  --  57*  --   --   --  78 80  --  74 67  < > 87 78 82   MARIO  --  8.7  --   --   --  9.0 8.8  --  8.7 8.6  < > 8.3* 7.7* 8.4*   MAG 2.4* 2.5* 2.6* 2.9*  < > 3.6* 1.6  --   --   --   --  2.0 1.6 1.9   PHOS  --   --   --   --   --   --  2.7  --   --   --   --  2.6 2.1* 2.6   PROTTOTAL  --  7.1  --   --   --   --  7.2  --  6.6* 7.0  < > 6.0* 5.9* 6.1*   ALBUMIN  --  3.6  --   --   --   --  3.7  --  3.4 3.5  < > 3.0* 2.9* 3.1*   BILITOTAL  --  0.9  --   --   --   --  1.0  --  0.8 0.9  < > 0.7 0.7 1.0   ALKPHOS  --  79  --   --   --   --  78  --  84 80  < > 67 72 78   AST  --  38  --   --   --   --  46*  --  49* 58*  < > 86* 112* 157*   ALT  --  178*  --   --   --   --  206*  --  209* 240*  < > 302* 330* 383*   < > = values in this interval not displayed.  CBC  Recent Labs  Lab 01/17/17  0400 01/16/17  0420 01/14/17 0403 01/13/17 2003   WBC 5.8 5.6 4.3 5.4   RBC 4.03 3.86 3.96 3.95   HGB 11.6* 11.1* 11.4* 11.5*   HCT 37.0 35.2 35.9 36.0   MCV 92 91 91 91   MCH 28.8 28.8 28.8 29.1   MCHC 31.4* 31.5 31.8 31.9   RDW 15.5* 15.6* 15.3* 15.3*    145* 152 148*     INR  Recent Labs  Lab 01/19/17  0335 01/18/17  0401 01/17/17  0400 01/16/17  0420   INR 1.54* 1.36* 1.51* 1.55*     Arterial Blood Gas  Recent Labs  Lab 01/19/17  0545 01/19/17  0337 01/19/17  0154 01/18/17  2354   O2PER 21.0 21.0 21.0 21.0       Imaging Studies:  CXR:  FINDINGS: Single AP view of the chest was obtained. Right IJ [place]-Noelle  catheter tip projecting over right pulmonary artery, unchanged. Stable  left pectoral automatic implantable cardiac defibrillator. A stable  cardiomegaly. Right PICC tip terminates at the superior atriocaval  junction. No focal airspace opacity. No pneumothorax or  pleural  effusion.       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    Updates:  - dobutamine increased this morning to 7.5mcg/kg/min. Nipride at 1mcg/kg/min, re-started oral afterload reduction with hydralazine/ isordil. Will titarte down nirpide based on the hemodynamics  - mild creatinine elevation to 1.32 today      # Cardiogenic shock/  acute on chronic systolic heart failure from NICM secondary to polysubstance abuse  with EF 20-25% s/p ICD:  Improving  NYHA Class IIIb, Stage D.     - dobutamine @ 7.5mcg/kg/min  - nipride gtt for afterload reduction, cross-titrating off and up-titrating PO afterload reduction  Currently at hydralazine 75mg qid, isordil 30mg tid and nipride at 0.5mcg/kg/min  - continue digoxin 62.5 mcg  - diuresis: bumex gtt, diuril 500mg x1 today  - Q4 hr HD  - strict I/O, daily weights  - f/u K and Mg while diuresing    - 8th admission for heart failure since being diagnosed in July 2016. Anne is unable to be considered for advanced heart failure therapies, including LVAD and heart transplant, until she is sober for 1 year (most recent known substance use was marijuana in October 2016).  However, in setting of her decompensations, LVAD candidacy will be re-evaluated. Plan for transfer to [facility] tomorrow for another opinion given that she is quite sick and has now shown > 6 months of compliance with clinic visits and negative tox screens    # SVT: no episodes since last night    # Non-infectious aortic valve vegetation (EVAN 8/2016)    # Gallstones, suspected cholecystitis/ pain is now resolved and she  is tolerating oral intake: US and CT showing GB wall thickening and inflammation w/ cholelithiasis  No CBD dilation  - tolerating regular diet now  - NO oxycodone for pain, given hx of addiction  - zofran prn for nausea  - completed 5-day course of ertapenem  - General surgery consulted for lap cholecystectomy vs percutaneous drainage. Given that HIDA scan was negative for cholecystitis (although reduced EF), surgery team recommended holding off on any procedures given high operative risk and questionable improvement in symptoms with the surgery    # Elevated trans-aminases/ improvin/2 gallstones and congestive hepatopathy  - down-trending, continue to trend bid    # ROBERT on CKD:  baseline Cr is 0.65, recurrent elevation above baseline - cardiorenal in etiology. Cr 1 --> 1.32 today, associated w/ elevated RA, PCWP, SVR and low CI  - strict I/O, daily weights  - f/u Cr and lytes  - avoid nephrotoxins    # Bilateral PE  2016  - Warfarin per pharmacy  - Daily INR    # Chronic normocytic anemia  Last iron studies suggest anemia of chronic disease, likely from cardiomyopathy. Hgb at baseline of ~ 11.0  - Continue iron supplements    # Anxiety/Depression:     - Continue buspar, cymbalta, atarax  - psych consulted for depression, cymbalta increased to 120mg  - NO benzos  - f/u with OP psychiatrist on discharge      FEN: cardiac diet  Prophylaxis: on warfarin for PE in 2016  Code Status: full  Disposition: transfer to floor within 1-2 days    Plan of care discussed with Dr. Kaylynn Mixon MD  PGY3 Internal Medicine  [phone]

## 2017-01-19 NOTE — DISCHARGE SUMMARY
[facility] Discharge Summary    Anne Eugene MRN# [id number]   Age: 26 year old YOB: 1990     Date of Admission:  1/6/2017  Date of Discharge::  1/20/2017  Admitting Physician:  Elvira Storey MD  Discharge Physician:  Coty Mixon MD  Primary Physician: Yusuf Elizabeth  Transferring Facility: [facility]             Admission Diagnoses:   Acute on chronic systolic congestive heart failure (H) [I50.23]          Discharge Diagnosis:   Principle diagnosis: cardiogenic shock  Secondary diagnoses:  Non ischemic cardiomyopathy/   Acute kidney injury  Acute liver injury  Cholelithiasis  Normocytic anemia  Anxiety/ Depression            Procedures:   RHC on 1/13/2017:    BSA 1.74  1. HR 84 bpm  2. /69/89 mmHg  3. RA 29/29/20  4. RV 54/18  5. PA 56/30/39    6. PCW 33/34/29    7. PA sat 30.9%    8. PCW sat 93.8%  9. Hgb 9.9 g/dL    10. Keely CO 2.3    11. Keely CI 1.3    12. TD CO 2.4    13. TD CI 1.4    14. PVR 1.3  15. SVR 2300          Allergies:      Allergies   Allergen Reactions     Amoxicillin Hives     Per previous records     Ceclor [Cefaclor] Hives     Per previous records     Clindamycin      THROWING UP             Medications Prior to Admission:     Prescriptions prior to admission   Medication Sig Dispense Refill Last Dose     [DISCONTINUED] furosemide (LASIX) 10 MG/ML injection Inject 16 mLs (160 mg) into the vein once 16 mL 0      D5W 5 % SOLN 170 mL with DOBUTamine 250 MG/20ML SOLN 1,000 mg Inject 0.36 mg/min into the vein continuous 250 mL 11 Taking     Potassium Chloride ER 20 MEQ TBCR Take 3 tablets (60 mEq) by mouth 2 times daily 180 tablet 3 Taking     warfarin (COUMADIN) 2 MG tablet Take 6mg on 1/2, then 4mg daily until you get your INR checked on 1/4 or 1/5. 30 tablet 3 Taking     [DISCONTINUED] torsemide (DEMADEX) 20 MG tablet Take 4 tablets (80 mg) by mouth 2 times daily 240 tablet 3 Taking     Cyanocobalamin 1000 MCG CAPS Take 1,000 Units by mouth daily 60 capsule 2  Taking     DULoxetine (CYMBALTA) 30 MG EC capsule 3 capsules (90 mg) by Oral or Feeding Tube route daily 30 capsule 3 Taking     ondansetron (ZOFRAN-ODT) 4 MG ODT tab Take 1 tablet (4 mg) by mouth every 6 hours as needed for nausea 120 tablet 0 Taking     sodium chloride, PF, 0.9% PF flush 3 mLs by Intracatheter route every hour as needed for line flush 100 mL 3 Taking     thiamine 100 MG tablet Take 1 tablet (100 mg) by mouth daily 60 tablet 0 Taking     busPIRone 30 MG TABS Take 30 mg by mouth 3 times daily 90 tablet  Taking     Blood Pressure Monitor KIT 1 each daily 1 kit 0 Taking     [DISCONTINUED] spironolactone (ALDACTONE) 25 MG tablet Take 1 tablet (25 mg) by mouth daily 15 tablet 3 Taking     digoxin (LANOXIN) 125 MCG tablet Take 1 tablet (125 mcg) by mouth daily 30 tablet 3 Taking     hydrOXYzine (ATARAX) 50 MG tablet Take 1 tablet (50 mg) by mouth 3 times daily 90 tablet 0 Taking     polyethylene glycol (MIRALAX/GLYCOLAX) packet Take 1 packet by mouth daily    Taking     [DISCONTINUED] triamcinolone (KENALOG) 0.1 % cream Apply topically 2 times daily   Taking     melatonin 3 MG tablet Take 1 tablet (3 mg) by mouth nightly as needed for sleep (Patient taking differently: Take 3 mg by mouth At Bedtime ) 60 tablet 2 Taking     acetaminophen (TYLENOL) 325 MG tablet Take 1 tablet (325 mg) by mouth every 4 hours as needed for mild pain or fever 100 tablet 2 Taking     ferrous sulfate (IRON) 325 (65 FE) MG tablet Take 1 tablet (325 mg) by mouth 2 times daily 120 tablet 2 Taking     multivitamin, therapeutic with minerals (THERA-VIT-M) TABS Take 1 tablet by mouth daily 60 tablet 2 Taking     ascorbic acid (VITAMIN C) 500 MG tablet Take 2 tablets (1,000 mg) by mouth daily 120 tablet 2 Taking     Cholecalciferol (VITAMIN D) 2000 UNITS tablet Take 2,000 Units by mouth daily 60 tablet 2 Taking             Discharge Medications:     Current Discharge Medication List      START taking these medications    Details    isosorbide dinitrate (ISORDIL) 5 MG tablet Take 6 tablets (30 mg) by mouth 3 times daily    Associated Diagnoses: Cardiogenic shock (H)      nitroprusside (NIPRIDE) 0.4 mg/mL, sodium thiosulfate 4 mg/mL in D5W 125 mL infusion Inject 36.5 mcg/min into the vein continuous    Associated Diagnoses: Cardiogenic shock (H)      hydrALAZINE (APRESOLINE) 100 MG TABS tablet Take 1 tablet (100 mg) by mouth 4 times daily  Qty: 120 tablet    Associated Diagnoses: Cardiogenic shock (H)      bumetanide (BUMEX) 0.25 MG/ML infusion Inject 1.5 mg/hr into the vein continuous    Associated Diagnoses: Cardiogenic shock (H)      cyclobenzaprine (FLEXERIL) 10 MG tablet Take 1 tablet (10 mg) by mouth 3 times daily as needed for muscle spasms  Qty: 42 tablet    Associated Diagnoses: Muscle spasm      pantoprazole (PROTONIX) 40 MG EC tablet Take 1 tablet (40 mg) by mouth every morning  Qty: 30 tablet    Associated Diagnoses: Gastroesophageal reflux disease, esophagitis presence not specified         CONTINUE these medications which have NOT CHANGED    Details   D5W 5 % SOLN 170 mL with DOBUTamine 250 MG/20ML SOLN 1,000 mg Inject 0.36 mg/min into the vein continuous  Qty: 250 mL, Refills: 11    Associated Diagnoses: Chronic systolic heart failure (H)      Potassium Chloride ER 20 MEQ TBCR Take 3 tablets (60 mEq) by mouth 2 times daily  Qty: 180 tablet, Refills: 3    Associated Diagnoses: Chronic systolic heart failure (H)      warfarin (COUMADIN) 2 MG tablet Take 6mg on 1/2, then 4mg daily until you get your INR checked on 1/4 or 1/5.  Qty: 30 tablet, Refills: 3    Associated Diagnoses: Other chronic pulmonary embolism without acute cor pulmonale (H)      Cyanocobalamin 1000 MCG CAPS Take 1,000 Units by mouth daily  Qty: 60 capsule, Refills: 2    Associated Diagnoses: Nutritional deficiency      DULoxetine (CYMBALTA) 30 MG EC capsule 3 capsules (90 mg) by Oral or Feeding Tube route daily  Qty: 30 capsule, Refills: 3    Associated Diagnoses:  Episode of recurrent major depressive disorder, unspecified depression episode severity (H)      ondansetron (ZOFRAN-ODT) 4 MG ODT tab Take 1 tablet (4 mg) by mouth every 6 hours as needed for nausea  Qty: 120 tablet, Refills: 0    Associated Diagnoses: Nausea and vomiting, intractability of vomiting not specified, unspecified vomiting type      sodium chloride, PF, 0.9% PF flush 3 mLs by Intracatheter route every hour as needed for line flush  Qty: 100 mL, Refills: 3    Associated Diagnoses: PICC (peripherally inserted central catheter) in place      thiamine 100 MG tablet Take 1 tablet (100 mg) by mouth daily  Qty: 60 tablet, Refills: 0    Associated Diagnoses: Chronic systolic heart failure (H)      busPIRone 30 MG TABS Take 30 mg by mouth 3 times daily  Qty: 90 tablet      Blood Pressure Monitor KIT 1 each daily  Qty: 1 kit, Refills: 0    Associated Diagnoses: Chronic systolic heart failure (H)      digoxin (LANOXIN) 125 MCG tablet Take 1 tablet (125 mcg) by mouth daily  Qty: 30 tablet, Refills: 3    Associated Diagnoses: Chronic systolic heart failure (H)      hydrOXYzine (ATARAX) 50 MG tablet Take 1 tablet (50 mg) by mouth 3 times daily  Qty: 90 tablet, Refills: 0    Associated Diagnoses: Anxiety      polyethylene glycol (MIRALAX/GLYCOLAX) packet Take 1 packet by mouth daily       melatonin 3 MG tablet Take 1 tablet (3 mg) by mouth nightly as needed for sleep  Qty: 60 tablet, Refills: 2    Associated Diagnoses: Insomnia, unspecified type      acetaminophen (TYLENOL) 325 MG tablet Take 1 tablet (325 mg) by mouth every 4 hours as needed for mild pain or fever  Qty: 100 tablet, Refills: 2    Associated Diagnoses: Nonintractable episodic headache, unspecified headache type      ferrous sulfate (IRON) 325 (65 FE) MG tablet Take 1 tablet (325 mg) by mouth 2 times daily  Qty: 120 tablet, Refills: 2    Associated Diagnoses: Iron deficiency anemia, unspecified iron deficiency anemia type      multivitamin, therapeutic  with minerals (THERA-VIT-M) TABS Take 1 tablet by mouth daily  Qty: 60 tablet, Refills: 2    Associated Diagnoses: Nutritional deficiency      ascorbic acid (VITAMIN C) 500 MG tablet Take 2 tablets (1,000 mg) by mouth daily  Qty: 120 tablet, Refills: 2    Associated Diagnoses: Nutritional deficiency      Cholecalciferol (VITAMIN D) 2000 UNITS tablet Take 2,000 Units by mouth daily  Qty: 60 tablet, Refills: 2    Associated Diagnoses: Nutritional deficiency         STOP taking these medications       furosemide (LASIX) 10 MG/ML injection Comments:   Reason for Stopping:         torsemide (DEMADEX) 20 MG tablet Comments:   Reason for Stopping:         spironolactone (ALDACTONE) 25 MG tablet Comments:   Reason for Stopping:         triamcinolone (KENALOG) 0.1 % cream Comments:   Reason for Stopping:                     Consultations:   Consultation during this admission received from surgery and psychiatry          Brief History of Presenting Illness:   26 year old female with PMHx significant for NICM secondary to polysubstance abuse (methamphetamine, marijuana, tobacco; initially diagnosed 7/2016 upon transfer from OSH in severe cardiogenic shock, bilateral PEs, and shock liver), s/p ICD (10/12/16), 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 On presentation, she had dyspnea on exertion, weight gain of 3 lbs, worsening nausea/ vomiting, bloating.          Hospital Course:     # Cardiogenic shock/  acute on chronic systolic heart failure from NICM secondary to polysubstance abuse  with EF 20-25% s/p ICD:  NYHA Class IIIb, Stage D.   She presented on 1/6 with HF exacerbation, she was on digoxin 62.5mcg, dobutamine 5mcg/kg/min at home, which was increased to 7.5mcg/kg/min on 1/7 . She continued to diurese well on dobutamine at 7.5 mcg/kg/min, and creatinine reached baseline 0.65 on 1/12. However,  on evening of 1/12, she had an episode of pre-syncope, which seemed vaso-vagal. However, given concern for orthostasis and mild intravascular depletion, bumex held and dobutamine decreased to 6mcg/kg/min. She continued to feel poorly with nausea and chest congestion. RHC on 1/13 showed CI: 1.3, PCWP: 29, SVR 2300 and  PA sat 31% . She was transferred to ICU with leave in [place]. She was started on dobutamine + nipride, briefly on milrinone with goal to titrate down and stop nipride and keep on milrinone alone, however her hemodynamics did not improve on milrinone and she continued to require even higher doses of nipride. She will be discharged to [facility] for evaluation of LVAD candidacy, on dobutamine @ 7.5mcg/kg/min, digoxin 62.5 mcg , nipride 0.5 mcg/kg/min, oral afterload reduction with hydralazine 100mg qid and isordil 30mg tid. Her MAPs remained 65-75mm Hg and lactic acid remained normal. Her most recent HD on 1/20 prior to discharge were CVP: 18, PA: 59/ 29, CI 1.9 and SVR: 1120. She continues to diurese on bumex gtt, currently @ 1.5mg/hr. Creatinine is 1.58 today. We continued HD monitoring Q4hr and monitored creatinine and electrolytes.     This is her 8th admission for heart failure since being diagnosed in July 2016. Anne is unable to be considered for advanced heart failure therapies including LVAD and heart transplant, here at [facility].  However, in setting of her decompensations, LVAD candidacy will be re-evaluated at [facility]. Transfer to [facility] today for another opinion given that she is quite sick and has now shown > 6 months of compliance with clinic visits and negative tox screens    # SVT (Likely short RP tachycardia) w/ HR in 140-170s: In setting of hypokalemia and hypomagnesemia (with aggressive diuresis) on 1/18. She remained hemodynamically stable and converted to sinus with correction of electrolytes and amiodarone 75mg. She remained in sinus tachycardia for rest of the inpatient stay.     # Non-infectious  "aortic valve vegetation (EVAN 2016)    # Gallstones, suspected cholecystitis on US abdomen on 1/10 however the HIDA scan was negative for cholecystitis. General surgery consulted for lap cholecystectomy vs percutaneous drainage. Given that HIDA scan was negative for cholecystitis (although reduced EF), surgery team recommended holding off on any procedures given high operative risk and questionable improvement in symptoms with the surgery.  Pain resolved within 2 days, she completed 5 day course of ertapenem.     # Elevated trans-aminases/ improvin/2 gallstones and congestive hepatopathy. Liver enzymes continue to down-trend.    # ROBERT on CKD:  baseline Cr is 0.65, recurrent elevation above baseline - cardiorenal in etiology. Creatinine elevated to 1.54 today. We monitored intake/ output, lytes/ creatinine and held RAAS inhibitors.     # Bilateral PE  2016: We continued warfarin per pharmacy recommendations, INR 1.96 today    # Chronic normocytic anemia: Last iron studies suggested anemia of chronic disease, likely from cardiomyopathy. Hgb remained at baseline of ~ 11.0.     # Anxiety/Depression:   We continued buspar, cymbalta, atarax.    Physical examination on the day of discharge:  /58 mmHg  Pulse 81  Temp(Src) 97.7  F (36.5  C) (Axillary)  Resp 12  Ht 1.6 m (5' 3\")  Wt 69.9 kg (154 lb 1.6 oz)  BMI 27.30 kg/m2  SpO2 96%  GEN: awake, alert, resting comfortably in bed   HEENT: no scleral icterus, mucus membranes moist  NECK: Supple, swan in right IJ  RESP: breathing comfortably on RA, good b/l air entry, CTAB  CV: Regular, normal rate, normal S1 and S2, a holosystolic murmur in tricuspid and mitral area  ABD: Soft, non tender, non distended, NABS  EXT: No peripheral edema, warm/well perfused    NEURO: AAO * 3, no gross focal deficits         Pending Tests at Discharge:   None         Discharge Instructions and Follow-Up:   Discharge diet:  2 gm salt diet, 1500ml fluid restriction   Discharge " activity: Activity as tolerated           [facility]:   [facility]      Attestation:  I have reviewed today's vital signs, notes, medications, labs and imaging.    Coty Mixon MD

## 2017-01-19 NOTE — PROGRESS NOTES
Care Coordinator- Discharge Planning     Admission Date/Time:  1/6/2017  Attending MD:  Elvira Storey MD     Data  Chart reviewed, discussed with interdisciplinary team.   Patient was admitted for:   1. Cardiogenic shock (H)    2. Nonischemic cardiomyopathy (H)    3. Chronic systolic heart failure (H)           Assessment / Coordination of Care   Cards 2 team reported that pt is not transferring today, plan to transfer her tomorrow, 1/20.  The team request that I call [facility] and discuss about the transfer for tomorrow # [phone].  CC called [facility] and spoke the accepting provider, Dr. Quach.  Dr. Baeza stated pt has been accepted but not sure about the bed availability and the charge nurse from the ICU will call me in the morning to informer me about the bed availability.  CC contact number provided.  CC visited pt and pt mom and provided update about the transfer and the insurance coverage.  Pt and pt mom agreed with the plan.       Plan  Anticipated Discharge Date: tomorrow, 1/20.  Anticipated Discharge Plan: Transfer to [facility] for second opinion for LVAD.  [facility] will call in the morning regarding bed availability.  CC will cont to follow plan of care.       Alonso Teixeira RN, BSN  4A and 4E/ ICU  Care Coordinator  Phone: [phone]  Pager: [phone]

## 2017-01-19 NOTE — PLAN OF CARE
Problem: Patient Care Overview (Adult)  Goal: Plan of Care Review      AM K=3.6, KCl 40 meq PO given.   12:15 K=3.3. Giving KCl 40 meq replacement. (20 PO + 20 IV). Will recheck lytes at 16:00.    Pt still on Bumex gtt at 1 mg/hr. Also received Diruril IV x 1 dose this morning.   Dotubamine gtt at 7.5 mcg/kg/min, and Nipride gtt decreased to 1mcg/kg/min after Hydralazine was given at 11:30.     Pt has been c/o generalized body pain, some dizziness, and frequent shaking. Pt received Flexeril 10 mg PO this morning x 1 dose, as well as a dose of Tylenol 650 mg PO.     Pt is on a 2G Sodium diet and has had a good appetite.

## 2017-01-19 NOTE — PLAN OF CARE
"Afebrile, stable on RA.  KRYSTLE #'s Q 2 hrs.  Bumex gtt restarted at 2330, 3 mg bolus given.  700 mL UOP since restarting Bumex gtt.  Continuing to check K+ and Mag Q 2 hrs, total of 80 mEq K replacement given.  Frequently c/o of generalized, \"achey\" pain - no relief with PRN Tylenol and short term relief with Tramadol.  1x dose of Flexeril ordered, warm packs given to patient.  Nipride gtt increased to 1 mcg/kg/min, Dobutamine gtt remains unchanged.  Will continue to monitor and notify MD regarding changes in pt's condition.  "

## 2017-01-20 ENCOUNTER — APPOINTMENT (OUTPATIENT)
Dept: GENERAL RADIOLOGY | Facility: CLINIC | Age: 27
DRG: 286 | End: 2017-01-20
Attending: INTERNAL MEDICINE
Payer: COMMERCIAL

## 2017-01-20 VITALS
HEIGHT: 63 IN | WEIGHT: 154.1 LBS | OXYGEN SATURATION: 98 % | DIASTOLIC BLOOD PRESSURE: 56 MMHG | BODY MASS INDEX: 27.3 KG/M2 | TEMPERATURE: 97.7 F | SYSTOLIC BLOOD PRESSURE: 96 MMHG | HEART RATE: 81 BPM | RESPIRATION RATE: 12 BRPM

## 2017-01-20 LAB
ALBUMIN SERPL-MCNC: 3.8 G/DL (ref 3.4–5)
ALP SERPL-CCNC: 73 U/L (ref 40–150)
ALT SERPL W P-5'-P-CCNC: 146 U/L (ref 0–50)
ANION GAP SERPL CALCULATED.3IONS-SCNC: 10 MMOL/L (ref 3–14)
ANION GAP SERPL CALCULATED.3IONS-SCNC: 12 MMOL/L (ref 3–14)
AST SERPL W P-5'-P-CCNC: 32 U/L (ref 0–45)
BASE EXCESS BLDV CALC-SCNC: 5.3 MMOL/L
BASE EXCESS BLDV CALC-SCNC: 5.6 MMOL/L
BASE EXCESS BLDV CALC-SCNC: 6 MMOL/L
BASE EXCESS BLDV CALC-SCNC: 6.4 MMOL/L
BILIRUB SERPL-MCNC: 0.9 MG/DL (ref 0.2–1.3)
BUN SERPL-MCNC: 26 MG/DL (ref 7–30)
BUN SERPL-MCNC: 29 MG/DL (ref 7–30)
BUN SERPL-MCNC: 30 MG/DL (ref 7–30)
BUN SERPL-MCNC: 31 MG/DL (ref 7–30)
CALCIUM SERPL-MCNC: 9 MG/DL (ref 8.5–10.1)
CALCIUM SERPL-MCNC: 9.2 MG/DL (ref 8.5–10.1)
CALCIUM SERPL-MCNC: 9.3 MG/DL (ref 8.5–10.1)
CALCIUM SERPL-MCNC: 9.4 MG/DL (ref 8.5–10.1)
CHLORIDE SERPL-SCNC: 100 MMOL/L (ref 94–109)
CHLORIDE SERPL-SCNC: 98 MMOL/L (ref 94–109)
CO2 SERPL-SCNC: 27 MMOL/L (ref 20–32)
CO2 SERPL-SCNC: 28 MMOL/L (ref 20–32)
CO2 SERPL-SCNC: 28 MMOL/L (ref 20–32)
CO2 SERPL-SCNC: 29 MMOL/L (ref 20–32)
CREAT SERPL-MCNC: 1.54 MG/DL (ref 0.52–1.04)
CREAT SERPL-MCNC: 1.54 MG/DL (ref 0.52–1.04)
CREAT SERPL-MCNC: 1.58 MG/DL (ref 0.52–1.04)
CREAT SERPL-MCNC: 1.69 MG/DL (ref 0.52–1.04)
ERYTHROCYTE [DISTWIDTH] IN BLOOD BY AUTOMATED COUNT: 15.7 % (ref 10–15)
GFR SERPL CREATININE-BSD FRML MDRD: 37 ML/MIN/1.7M2
GFR SERPL CREATININE-BSD FRML MDRD: 40 ML/MIN/1.7M2
GFR SERPL CREATININE-BSD FRML MDRD: 41 ML/MIN/1.7M2
GFR SERPL CREATININE-BSD FRML MDRD: 41 ML/MIN/1.7M2
GLUCOSE SERPL-MCNC: 102 MG/DL (ref 70–99)
GLUCOSE SERPL-MCNC: 104 MG/DL (ref 70–99)
GLUCOSE SERPL-MCNC: 94 MG/DL (ref 70–99)
GLUCOSE SERPL-MCNC: 95 MG/DL (ref 70–99)
HCO3 BLDV-SCNC: 30 MMOL/L (ref 21–28)
HCO3 BLDV-SCNC: 30 MMOL/L (ref 21–28)
HCO3 BLDV-SCNC: 31 MMOL/L (ref 21–28)
HCO3 BLDV-SCNC: 31 MMOL/L (ref 21–28)
HCT VFR BLD AUTO: 38.3 % (ref 35–47)
HGB BLD-MCNC: 12.1 G/DL (ref 11.7–15.7)
INR PPP: 1.96 (ref 0.86–1.14)
LACTATE BLD-SCNC: 1.5 MMOL/L (ref 0.7–2.1)
LACTATE BLD-SCNC: 1.6 MMOL/L (ref 0.7–2.1)
LACTATE BLD-SCNC: 1.6 MMOL/L (ref 0.7–2.1)
LACTATE BLD-SCNC: 1.7 MMOL/L (ref 0.7–2.1)
MAGNESIUM SERPL-MCNC: 2.2 MG/DL (ref 1.6–2.3)
MAGNESIUM SERPL-MCNC: 2.4 MG/DL (ref 1.6–2.3)
MAGNESIUM SERPL-MCNC: 2.4 MG/DL (ref 1.6–2.3)
MAGNESIUM SERPL-MCNC: 2.7 MG/DL (ref 1.6–2.3)
MCH RBC QN AUTO: 28.9 PG (ref 26.5–33)
MCHC RBC AUTO-ENTMCNC: 31.6 G/DL (ref 31.5–36.5)
MCV RBC AUTO: 92 FL (ref 78–100)
O2/TOTAL GAS SETTING VFR VENT: 21 %
OXYHGB MFR BLDV: 49 %
OXYHGB MFR BLDV: 49 %
OXYHGB MFR BLDV: 53 %
OXYHGB MFR BLDV: 54 %
PCO2 BLDV: 43 MM HG (ref 40–50)
PCO2 BLDV: 44 MM HG (ref 40–50)
PCO2 BLDV: 45 MM HG (ref 40–50)
PCO2 BLDV: 46 MM HG (ref 40–50)
PH BLDV: 7.43 PH (ref 7.32–7.43)
PH BLDV: 7.44 PH (ref 7.32–7.43)
PH BLDV: 7.45 PH (ref 7.32–7.43)
PH BLDV: 7.46 PH (ref 7.32–7.43)
PLATELET # BLD AUTO: 200 10E9/L (ref 150–450)
PO2 BLDV: 30 MM HG (ref 25–47)
PO2 BLDV: 31 MM HG (ref 25–47)
PO2 BLDV: 32 MM HG (ref 25–47)
PO2 BLDV: 33 MM HG (ref 25–47)
POTASSIUM SERPL-SCNC: 3.3 MMOL/L (ref 3.4–5.3)
POTASSIUM SERPL-SCNC: 3.8 MMOL/L (ref 3.4–5.3)
POTASSIUM SERPL-SCNC: 3.9 MMOL/L (ref 3.4–5.3)
POTASSIUM SERPL-SCNC: 4.1 MMOL/L (ref 3.4–5.3)
PROT SERPL-MCNC: 7.2 G/DL (ref 6.8–8.8)
RBC # BLD AUTO: 4.18 10E12/L (ref 3.8–5.2)
SODIUM SERPL-SCNC: 135 MMOL/L (ref 133–144)
SODIUM SERPL-SCNC: 138 MMOL/L (ref 133–144)
SODIUM SERPL-SCNC: 139 MMOL/L (ref 133–144)
SODIUM SERPL-SCNC: 140 MMOL/L (ref 133–144)
THIOCYANATE SERPL-MCNC: 2.6 UG/ML
WBC # BLD AUTO: 6.7 10E9/L (ref 4–11)

## 2017-01-20 PROCEDURE — 82805 BLOOD GASES W/O2 SATURATION: CPT | Performed by: INTERNAL MEDICINE

## 2017-01-20 PROCEDURE — 25000125 ZZHC RX 250: Performed by: INTERNAL MEDICINE

## 2017-01-20 PROCEDURE — 83735 ASSAY OF MAGNESIUM: CPT | Performed by: INTERNAL MEDICINE

## 2017-01-20 PROCEDURE — S0171 BUMETANIDE 0.5 MG: HCPCS | Performed by: INTERNAL MEDICINE

## 2017-01-20 PROCEDURE — 85027 COMPLETE CBC AUTOMATED: CPT | Performed by: INTERNAL MEDICINE

## 2017-01-20 PROCEDURE — 25000132 ZZH RX MED GY IP 250 OP 250 PS 637: Performed by: INTERNAL MEDICINE

## 2017-01-20 PROCEDURE — 99239 HOSP IP/OBS DSCHRG MGMT >30: CPT | Mod: GC | Performed by: INTERNAL MEDICINE

## 2017-01-20 PROCEDURE — 83605 ASSAY OF LACTIC ACID: CPT | Performed by: INTERNAL MEDICINE

## 2017-01-20 PROCEDURE — 80048 BASIC METABOLIC PNL TOTAL CA: CPT | Performed by: INTERNAL MEDICINE

## 2017-01-20 PROCEDURE — 85610 PROTHROMBIN TIME: CPT | Performed by: INTERNAL MEDICINE

## 2017-01-20 PROCEDURE — 71010 XR CHEST PORT 1 VW: CPT

## 2017-01-20 PROCEDURE — 25000132 ZZH RX MED GY IP 250 OP 250 PS 637

## 2017-01-20 PROCEDURE — 80053 COMPREHEN METABOLIC PANEL: CPT | Performed by: INTERNAL MEDICINE

## 2017-01-20 PROCEDURE — 25000132 ZZH RX MED GY IP 250 OP 250 PS 637: Performed by: STUDENT IN AN ORGANIZED HEALTH CARE EDUCATION/TRAINING PROGRAM

## 2017-01-20 PROCEDURE — S0171 BUMETANIDE 0.5 MG: HCPCS | Performed by: STUDENT IN AN ORGANIZED HEALTH CARE EDUCATION/TRAINING PROGRAM

## 2017-01-20 PROCEDURE — 25000125 ZZHC RX 250: Performed by: STUDENT IN AN ORGANIZED HEALTH CARE EDUCATION/TRAINING PROGRAM

## 2017-01-20 RX ORDER — IBUPROFEN 200 MG
200 TABLET ORAL ONCE
Status: COMPLETED | OUTPATIENT
Start: 2017-01-20 | End: 2017-01-20

## 2017-01-20 RX ORDER — CYCLOBENZAPRINE HCL 10 MG
10 TABLET ORAL 3 TIMES DAILY PRN
Qty: 42 TABLET | DISCHARGE
Start: 2017-01-20 | End: 2023-05-23

## 2017-01-20 RX ORDER — BUMETANIDE 0.25 MG/ML
3 INJECTION INTRAMUSCULAR; INTRAVENOUS ONCE
Status: COMPLETED | OUTPATIENT
Start: 2017-01-20 | End: 2017-01-20

## 2017-01-20 RX ORDER — HYDRALAZINE HYDROCHLORIDE 100 MG/1
100 TABLET, FILM COATED ORAL 4 TIMES DAILY
Qty: 120 TABLET | DISCHARGE
Start: 2017-01-20 | End: 2023-05-23

## 2017-01-20 RX ORDER — LANOLIN ALCOHOL/MO/W.PET/CERES
100 CREAM (GRAM) TOPICAL DAILY
Status: DISCONTINUED | OUTPATIENT
Start: 2017-01-20 | End: 2017-01-20 | Stop reason: HOSPADM

## 2017-01-20 RX ORDER — MULTIPLE VITAMINS W/ MINERALS TAB 9MG-400MCG
1 TAB ORAL DAILY
Status: DISCONTINUED | OUTPATIENT
Start: 2017-01-20 | End: 2017-01-20 | Stop reason: HOSPADM

## 2017-01-20 RX ORDER — ISOSORBIDE DINITRATE 5 MG/1
30 TABLET ORAL 3 TIMES DAILY
DISCHARGE
Start: 2017-01-20 | End: 2023-05-23

## 2017-01-20 RX ORDER — POTASSIUM CHLORIDE 29.8 MG/ML
20 INJECTION INTRAVENOUS ONCE
Status: COMPLETED | OUTPATIENT
Start: 2017-01-20 | End: 2017-01-20

## 2017-01-20 RX ORDER — PANTOPRAZOLE SODIUM 40 MG/1
40 TABLET, DELAYED RELEASE ORAL EVERY MORNING
Qty: 30 TABLET | DISCHARGE
Start: 2017-01-20 | End: 2023-05-23

## 2017-01-20 RX ORDER — ACETAMINOPHEN 325 MG/1
650 TABLET ORAL ONCE
Status: COMPLETED | OUTPATIENT
Start: 2017-01-20 | End: 2017-01-20

## 2017-01-20 RX ORDER — CAFFEINE 200 MG
100 TABLET ORAL ONCE
Status: COMPLETED | OUTPATIENT
Start: 2017-01-20 | End: 2017-01-20

## 2017-01-20 RX ORDER — WARFARIN SODIUM 4 MG/1
4 TABLET ORAL
Status: DISCONTINUED | OUTPATIENT
Start: 2017-01-20 | End: 2017-01-20 | Stop reason: HOSPADM

## 2017-01-20 RX ADMIN — POTASSIUM CHLORIDE 40 MEQ: 750 TABLET, EXTENDED RELEASE ORAL at 08:38

## 2017-01-20 RX ADMIN — DOBUTAMINE 7.5 MCG/KG/MIN: 12.5 INJECTION, SOLUTION INTRAVENOUS at 10:07

## 2017-01-20 RX ADMIN — POTASSIUM CHLORIDE 20 MEQ: 29.8 INJECTION, SOLUTION INTRAVENOUS at 05:19

## 2017-01-20 RX ADMIN — POTASSIUM CHLORIDE 20 MEQ: 29.8 INJECTION, SOLUTION INTRAVENOUS at 10:27

## 2017-01-20 RX ADMIN — DULOXETINE 120 MG: 30 CAPSULE, DELAYED RELEASE ORAL at 08:37

## 2017-01-20 RX ADMIN — HYDROXYZINE HYDROCHLORIDE 50 MG: 50 TABLET, FILM COATED ORAL at 08:39

## 2017-01-20 RX ADMIN — HYDRALAZINE HYDROCHLORIDE 100 MG: 50 TABLET ORAL at 08:40

## 2017-01-20 RX ADMIN — PANTOPRAZOLE SODIUM 40 MG: 40 TABLET, DELAYED RELEASE ORAL at 08:39

## 2017-01-20 RX ADMIN — Medication 1.5 MG/HR: at 08:43

## 2017-01-20 RX ADMIN — IRON 325 MG: 65 TABLET ORAL at 08:39

## 2017-01-20 RX ADMIN — IBUPROFEN 200 MG: 200 TABLET, FILM COATED ORAL at 10:00

## 2017-01-20 RX ADMIN — BUSPIRONE HYDROCHLORIDE 30 MG: 15 TABLET ORAL at 08:39

## 2017-01-20 RX ADMIN — POTASSIUM CHLORIDE 20 MEQ: 750 TABLET, EXTENDED RELEASE ORAL at 13:37

## 2017-01-20 RX ADMIN — BUMETANIDE 3 MG: 0.25 INJECTION, SOLUTION INTRAMUSCULAR; INTRAVENOUS at 01:04

## 2017-01-20 RX ADMIN — Medication 100 MG: at 12:24

## 2017-01-20 RX ADMIN — SODIUM NITROPRUSSIDE 0.25 MCG/KG/MIN: 25 INJECTION, SOLUTION, CONCENTRATE INTRAVENOUS at 10:05

## 2017-01-20 RX ADMIN — ISOSORBIDE DINITRATE 30 MG: 10 TABLET ORAL at 08:38

## 2017-01-20 RX ADMIN — HYDRALAZINE HYDROCHLORIDE 100 MG: 50 TABLET ORAL at 12:24

## 2017-01-20 RX ADMIN — Medication 62.5 MCG: at 08:41

## 2017-01-20 RX ADMIN — ACETAMINOPHEN 650 MG: 325 TABLET, FILM COATED ORAL at 09:59

## 2017-01-20 RX ADMIN — POTASSIUM CHLORIDE 40 MEQ: 750 TABLET, EXTENDED RELEASE ORAL at 10:00

## 2017-01-20 RX ADMIN — MULTIPLE VITAMINS W/ MINERALS TAB 1 TABLET: TAB at 12:24

## 2017-01-20 RX ADMIN — Medication 100 MG: at 12:34

## 2017-01-20 NOTE — PROGRESS NOTES
CLINICAL NUTRITION SERVICES - REASSESSMENT NOTE     Nutrition Prescription    RECOMMENDATIONS FOR MDs/PROVIDERS TO ORDER:  None today.    Malnutrition Status:    Unable to determine (see below).    Recommendations already ordered by Registered Dietitian (RD):  Rec begin daily multivitamin with minerals and thiamine 100 mg/day given %EF.    Future/Additional Recommendations:  Continue to encourage use of oral nutrition supplements if appetite/intake declines.      EVALUATION OF THE PROGRESS TOWARD GOALS   Diet: 2 gram sodium    Intake: Previously with fair diet tolerance, more recently with good diet tolerance, consuming mostly % of meals with occasional intake of 0-25% intake (per RN documentation), patient/family report appetite somewhat decreased and eating small meals as not feeling very hungry (for example- 1/2 bagel at breakfast, 1/2 sandwich at lunch), patient dislikes most oral nutrition supplements as worried about gaining weight and fluid restricted     NEW FINDINGS   -Weight: 69.9 kg on 1/15, down 2 kg from adm (3%). On Bumex.   -GI: +BM on 1/19, bowel regimen ordered (have been holding).  -Skin: Phi: 20, Nutrition Phi: 2 on 1/19  -Labs/Meds: K+ low @ 3.3, PRN replacement protocol ordered.     MALNUTRITION  % Intake: Possibly meeting this criteria   % Weight Loss: Weight loss difficult to assess with fluid status  Subcutaneous Fat Loss: Unable to assess  Muscle Loss: Unable to assess  Fluid Accumulation/Edema: None noted  Malnutrition Diagnosis: Unable to determine due to patient attempting to sleep with eye mask/blanket during visit so deferred physical assessment at this time    Previous Goals   Patient to consume % of nutritionally adequate meal trays TID, or the equivalent with supplements/snacks.  Evaluation: Not met daily    Previous Nutrition Diagnosis  Inadequate protein-energy intake related to variable PO intakes and intermittent NPO x 2 days this week as evidenced by NPO x 2  days this week the documentation of fair appetite and consuming % of meals documented when diet ordered this week  Evaluation: No longer applicable, nutrition diagnosis changed below    CURRENT NUTRITION DIAGNOSIS  Inadequate oral intake related to intermittent periods of decreased appetite as evidenced by patient/family report of consuming smaller meals or 1/2 of meals (compared to baseline).      INTERVENTIONS  Implementation  Nutrition Education- Encouraged use of nutrition supplements (Prostat, BenePRO) if appetite does not improve. Patient declined supplements during visit today.  Collaboration with other providers- Discussed order micronutrient supplementation as rec above with MD.  Multivitamin/mineral supplement therapy- Ordered Thera-vit-m and thiamine as rec above.     Goals  Patient to consume at least 75% of nutritionally adequate meal trays TID, or the equivalent with snacks/supplements.    Monitoring/Evaluation  Progress toward goals will be monitored and evaluated per protocol.      Carla Jacobson RD, SWATHI (pager [phone])  RD will continue to follow

## 2017-01-20 NOTE — PLAN OF CARE
"Problem: Cardiac Output Decreased (Adult)  Goal: Identify Related Risk Factors and Signs and Symptoms  Related risk factors and signs and symptoms are identified upon initiation of Human [employer] Clinical Practice Guideline (CPG)   Outcome: No Change  C/o of significant \"pain\" described as HA behind bilateral eyes with significant sensitivity to light. Extremely anxious and nervous about \"going blind.\"  Pupils remain equal and reactive.  MD notified and attributed to medications.  No improvement after Benadryl/Compazine combination, holding Nipride gtt, PRN Kytril, cold compresses, various essential oils and low stimulation environment.  Although, pt more sleepy and calm after Benadryl/Compazine administration.  Avoiding narcotics if possible.  Continuing with KRYSTLE # Q 4 hours.  Nipride gtt restarted at 0.25 mcg/kg/min and Dobutamine continues at 7.5 mcg/kg/min.  Bumex gtt infusing at 1.5 mg/hr, 3 mg bolus given - total  mL since 00 - MD aware.  Replacing electrolytes as ordered per protocol.  Plan to transfer to [facility] today for LVAD evaluation.  Mom present at bedside t/o night, updated on pt's status and POC.  Will continue to monitor and notify MD regarding changes in pt's condition.        "

## 2017-01-20 NOTE — PLAN OF CARE
Problem: Patient Care Overview (Adult)  Goal: Plan of Care Review  Outcome: No Change  D:  Pt with NICM 2/2 to polysubstance, acute on chronic systolic heart failure with EF 20-25 s/p ICD 10/16 transferred to [facility] for LVAD.  I/A: Bumex 1.5 mg/hr, Dobutamine 7.5 mcg/kg/min and Nipride.0.5 mcg/kg/min; pt has been c/o increase generalized tremors x yesterday, MD aware and  sensitivity to light with pain behind eyes again, MD aware; Motrin 200 mg, Tylenol 350 mg, and caffeine 100 mg  given with no change in pain .  Also c/o increase sensitivity/pain in legs.   Pt is very sensitive to electrolytes imbalances,so monitoring BMPevery four hours and replacing electrolytes per protocol alone with Keely numbers. CO 3-3.3, CI 1.7-1.9.   Report given to Paulina at [facility], pt and pt's family aware of transfer and belongings sent with pt.

## 2017-01-20 NOTE — PLAN OF CARE
"Problem: Goal Outcome Summary  Goal: Goal Outcome Summary  1. Pt will be hemodynamically stable  2. Pt s pain will be managed within stated goal      Replaced potassium as needed today. Bumex gtt inreased to 1.5 mg/hr. Urine output is 1.6 liters for the day.  Nipride gtt decreased to 0.5 and Dobutamine gtt at 7.5 mcg/kg/min. Appetite improved today. Pt is on a fluid restriction of 1500 ml/day and pt is aware. Pt c/o headache and pain \"behind her eyes\". Notified Cards II and they examined pt. Pt has Tylenol and Flexeril PRN.  Plan is for pt to transfer to [facility] tomorrow. SW will follow up with time, etc.        "

## 2017-01-20 NOTE — PROGRESS NOTES
Care Coordinator- Discharge Planning     Admission Date/Time:  1/6/2017  Attending MD:  Elvira Storey MD     Data  Chart reviewed, discussed with interdisciplinary team.   Patient was admitted for:   1. Cardiogenic shock (H)    2. Nonischemic cardiomyopathy (H)    3. Chronic systolic heart failure (H)    4. Muscle spasm    5. Gastroesophageal reflux disease, esophagitis presence not specified           Assessment  [facility] ICU charge Noemy arriola called the unit, 4C and informed the charge nurse that they have bed today and can take pt anytime after 1:00.  CC called [facility] transport # [phone] and arranged stretcher transport with cardiac monitoring and IV pump for today 1:30  time  CC called [facility] and informed them the  time.   CC visited pt and pt mom and provided up date about the transfer plan.  Both pt and mom are in agreement with the plan.    time have been shared with charge nurse, bedside RN and cards 2 team.    Plan:  Anticipated Discharge Date: today, 1/20/17 at 1:30.  [facility] will provide transport.  Anticipated Discharge Plan: Transfer to [facility].  [facility] transport will provide stretcher transport with tele monitor.      Alonso Teixeira, RN, BSN  4A and 4E/ ICU  Care Coordinator  Phone: [phone]  Pager: [phone]

## 2017-01-23 ENCOUNTER — TELEPHONE (OUTPATIENT)
Dept: PHARMACY | Facility: OTHER | Age: 27
End: 2017-01-23

## 2017-01-23 NOTE — TELEPHONE ENCOUNTER
MTM referral from: Transitions of Care (recent hospital discharge or ED visit)    MTM referral outreach attempt #1 on January 23, 2017 at 2:32 PM      Outcome: Left Message    Jessica Espinoza MTM Coordinator

## 2017-03-17 ENCOUNTER — HISTORIC RESULTS (OUTPATIENT)
Dept: ADMINISTRATIVE | Age: 27
End: 2017-03-17

## 2017-06-16 ENCOUNTER — HISTORIC RESULTS (OUTPATIENT)
Dept: ADMINISTRATIVE | Age: 27
End: 2017-06-16

## 2017-09-22 ENCOUNTER — HISTORIC RESULTS (OUTPATIENT)
Dept: ADMINISTRATIVE | Age: 27
End: 2017-09-22

## 2017-12-27 ENCOUNTER — HISTORIC RESULTS (OUTPATIENT)
Dept: ADMINISTRATIVE | Age: 27
End: 2017-12-27

## 2021-07-14 PROBLEM — N19 HYPERTENSIVE HEART AND RENAL DISEASE WITH RENAL FAILURE: Status: RESOLVED | Noted: 2017-01-23 | Resolved: 2017-06-16

## 2021-07-14 PROBLEM — I13.10 HYPERTENSIVE HEART AND RENAL DISEASE WITH RENAL FAILURE: Status: RESOLVED | Noted: 2017-01-23 | Resolved: 2017-06-16

## 2021-08-03 PROBLEM — T40.1X1A: Status: RESOLVED | Noted: 2017-05-20 | Resolved: 2017-08-11

## 2023-04-14 ENCOUNTER — MEDICAL CORRESPONDENCE (OUTPATIENT)
Dept: HEALTH INFORMATION MANAGEMENT | Facility: CLINIC | Age: 33
End: 2023-04-14
Payer: MEDICARE

## 2023-04-21 ENCOUNTER — TELEPHONE (OUTPATIENT)
Dept: EMERGENCY MEDICINE | Facility: CLINIC | Age: 33
End: 2023-04-21

## 2023-04-21 ENCOUNTER — TELEPHONE (OUTPATIENT)
Dept: PULMONOLOGY | Facility: CLINIC | Age: 33
End: 2023-04-21
Payer: MEDICARE

## 2023-04-24 ENCOUNTER — TELEPHONE (OUTPATIENT)
Dept: PULMONOLOGY | Facility: CLINIC | Age: 33
End: 2023-04-24
Payer: MEDICARE

## 2023-04-24 NOTE — TELEPHONE ENCOUNTER
Patient Contacted for the patient to call back and schedule the following:    Appointment type: New  Provider: Lupe  Return date: 4/24/23  Specialty phone number: [phone]  Additional appointment(s) needed: na  Additonal Notes: 4/24/23 - re: previous call 4/21/23. Spoke to pt's mother, Maritza. Stated that the May 4th date does not work for the held appointment, they must travel 4 hours each way from [place]. Is there any possible availability on May 1st or 2nd, they will be in [place] for other appts.?  I was not able to find a workable opening at the initial call. iliana

## 2023-04-27 ENCOUNTER — TELEPHONE (OUTPATIENT)
Dept: PULMONOLOGY | Facility: CLINIC | Age: 33
End: 2023-04-27
Payer: MEDICARE

## 2023-04-27 NOTE — TELEPHONE ENCOUNTER
Patient Contacted for the patient to call back and schedule the following:    Appointment type: rtn  Provider: Emily  Return date: 5/23/23  Specialty phone number: [phone]  Additional appointment(s) needed: na  Additonal Notes: 4/27/23 - Confirmed [facility] appointments on 5/23/23, FPFT and visit with Dr. Diaz. Will send printed itinerary. [facility]

## 2023-04-27 NOTE — TELEPHONE ENCOUNTER
Writer LVM for pt explaining that we are not able to accommodate an appt on 5/1 or 5/2. Writer explained the next available PFT and appt is 5/23 at 12pm. Writer asked pt to call back and confirm appts.    Tiffany Johnson

## 2023-04-27 NOTE — TELEPHONE ENCOUNTER
Patient Contacted for the patient to call back and schedule the following:    Appointment type: new  Provider: perlman  Return date: 5/23/23  Specialty phone number: na  Additional appointment(s) needed: na  Additonal Notes: 4/27/23 - called to confirm scheduled appt for pft. Need to confirm details, will call pt's mother, neil. [facility]

## 2023-04-28 NOTE — TELEPHONE ENCOUNTER
RECORDS RECEIVED FROM: internal /ce   DATE RECEIVED: 5.23.23    NOTES STATUS DETAILS   OFFICE NOTE from referring provider internal     OFFICE NOTE from other specialist     DISCHARGE SUMMARY from hospital     DIS[facility]GE [facility] ER [facility]    MEDICATION LIST internal     IMAGING  (NEED IMAGES AND REPORTS)     CT SCAN [facility]- 2.27.23, 2.10.23, 1.25.23 12.19.22, 12.10.22,    CHEST XRAY (CXR) [facility]- 4.4.23, 3.13.23, 3.6.23, 2.21.23, 2.20.23, 2.18.23, 2.16.23 more in epic CE   TESTS     PULMONARY FUNCTION TESTING (PFT) internal  Scheduled 5.23.23        Action 4.28.23 sv    Action Taken Image request sent to [facility] for   -CXR- 4.4.23, 3.13.23, 3.6.23, 2.21.23, 2.20.23, 2.18.23, 2.16.23 more in epic CE  -CT- 2.27.23, 2.10.23, 1.25.23 12.19.22, 12.10.22, more in epic CE  --received images---

## 2023-05-23 ENCOUNTER — OFFICE VISIT (OUTPATIENT)
Dept: PULMONOLOGY | Facility: CLINIC | Age: 33
End: 2023-05-23
Attending: INTERNAL MEDICINE
Payer: MEDICARE

## 2023-05-23 ENCOUNTER — PRE VISIT (OUTPATIENT)
Dept: PULMONOLOGY | Facility: CLINIC | Age: 33
End: 2023-05-23

## 2023-05-23 VITALS
DIASTOLIC BLOOD PRESSURE: 65 MMHG | HEIGHT: 63 IN | HEART RATE: 79 BPM | RESPIRATION RATE: 17 BRPM | BODY MASS INDEX: 32.91 KG/M2 | SYSTOLIC BLOOD PRESSURE: 110 MMHG | OXYGEN SATURATION: 97 %

## 2023-05-23 DIAGNOSIS — I50.22 CHRONIC SYSTOLIC HEART FAILURE (H): Primary | ICD-10-CM

## 2023-05-23 DIAGNOSIS — J18.1 CONSOLIDATION LUNG (H): Primary | ICD-10-CM

## 2023-05-23 PROCEDURE — 94729 DIFFUSING CAPACITY: CPT | Performed by: INTERNAL MEDICINE

## 2023-05-23 PROCEDURE — G0463 HOSPITAL OUTPT CLINIC VISIT: HCPCS | Performed by: INTERNAL MEDICINE

## 2023-05-23 PROCEDURE — 94375 RESPIRATORY FLOW VOLUME LOOP: CPT | Performed by: INTERNAL MEDICINE

## 2023-05-23 PROCEDURE — 99205 OFFICE O/P NEW HI 60 MIN: CPT | Mod: 25 | Performed by: INTERNAL MEDICINE

## 2023-05-23 PROCEDURE — 94726 PLETHYSMOGRAPHY LUNG VOLUMES: CPT | Performed by: INTERNAL MEDICINE

## 2023-05-23 RX ORDER — NYSTATIN 100000/ML
SUSPENSION, ORAL (FINAL DOSE FORM) ORAL
COMMUNITY
Start: 2023-03-21

## 2023-05-23 RX ORDER — ASPIRIN 81 MG/1
81 TABLET, CHEWABLE ORAL
COMMUNITY
Start: 2023-03-21

## 2023-05-23 RX ORDER — ALBUTEROL SULFATE 90 UG/1
1-2 AEROSOL, METERED RESPIRATORY (INHALATION)
COMMUNITY
Start: 2023-02-24

## 2023-05-23 RX ORDER — BUPRENORPHINE AND NALOXONE 2; .5 MG/1; MG/1
FILM, SOLUBLE BUCCAL; SUBLINGUAL
COMMUNITY
Start: 2023-02-22

## 2023-05-23 RX ORDER — ARIPIPRAZOLE 10 MG/1
TABLET ORAL
COMMUNITY
Start: 2023-02-24

## 2023-05-23 RX ORDER — GABAPENTIN 300 MG/1
CAPSULE ORAL
COMMUNITY
Start: 2023-04-04

## 2023-05-23 RX ORDER — BIOTIN 5 MG
1 TABLET ORAL DAILY
COMMUNITY
Start: 2023-04-04

## 2023-05-23 RX ORDER — FUROSEMIDE INJECTION 80 MG/ 10 ML 8 MG/ML
80 INJECTION SUBCUTANEOUS
COMMUNITY
Start: 2023-04-12

## 2023-05-23 RX ORDER — MAGNESIUM 64 MG (MAGNESIUM CHLORIDE) TABLET,DELAYED RELEASE
COMMUNITY
Start: 2023-04-04

## 2023-05-23 RX ORDER — QUETIAPINE FUMARATE 25 MG/1
25-50 TABLET, FILM COATED ORAL
COMMUNITY
Start: 2023-02-24

## 2023-05-23 RX ORDER — IPRATROPIUM BROMIDE AND ALBUTEROL SULFATE 2.5; .5 MG/3ML; MG/3ML
3 SOLUTION RESPIRATORY (INHALATION)
COMMUNITY
Start: 2023-01-06

## 2023-05-23 RX ORDER — MULTIVITAMIN
1 TABLET,CHEWABLE ORAL
COMMUNITY
Start: 2023-02-23

## 2023-05-23 RX ORDER — AMOXICILLIN 500 MG/1
CAPSULE ORAL
COMMUNITY
Start: 2023-03-21

## 2023-05-23 RX ORDER — METOLAZONE 5 MG/1
TABLET ORAL
COMMUNITY
Start: 2023-04-18 | End: 2023-05-23

## 2023-05-23 RX ORDER — VALGANCICLOVIR 450 MG/1
450 TABLET, FILM COATED ORAL
COMMUNITY
Start: 2023-04-18

## 2023-05-23 RX ORDER — MYCOPHENOLATE MOFETIL 250 MG/1
1000 CAPSULE ORAL EVERY 12 HOURS
COMMUNITY
Start: 2023-03-21

## 2023-05-23 RX ORDER — SERTRALINE HYDROCHLORIDE 100 MG/1
2 TABLET, FILM COATED ORAL EVERY MORNING
COMMUNITY
Start: 2023-02-24

## 2023-05-23 RX ORDER — SENNOSIDES A AND B 8.6 MG/1
17.2 TABLET, FILM COATED ORAL
COMMUNITY
Start: 2023-03-21

## 2023-05-23 RX ORDER — SPIRONOLACTONE 25 MG/1
1 TABLET ORAL EVERY MORNING
COMMUNITY
Start: 2023-03-21

## 2023-05-23 RX ORDER — LAMOTRIGINE 150 MG/1
1 TABLET ORAL AT BEDTIME
COMMUNITY
Start: 2023-02-21

## 2023-05-23 RX ORDER — PRAVASTATIN SODIUM 40 MG
40 TABLET ORAL DAILY
COMMUNITY
Start: 2023-03-21

## 2023-05-23 RX ORDER — ESTRADIOL 1 MG/1
2.5 TABLET ORAL
COMMUNITY
Start: 2023-02-22

## 2023-05-23 RX ORDER — ARIPIPRAZOLE 5 MG/1
TABLET ORAL
COMMUNITY
Start: 2023-02-24

## 2023-05-23 RX ORDER — PREDNISONE 5 MG/1
TABLET ORAL
COMMUNITY
Start: 2023-04-18

## 2023-05-23 RX ORDER — LIDOCAINE 4 G/G
PATCH TOPICAL
COMMUNITY
Start: 2023-02-22

## 2023-05-23 ASSESSMENT — PAIN SCALES - GENERAL: PAINLEVEL: MODERATE PAIN (4)

## 2023-05-23 NOTE — NURSING NOTE
Chief Complaint   Patient presents with     Consult     New Hemothorax    Medications reviewed and vital signs taken.   Socorro Fernandez CMA

## 2023-05-23 NOTE — LETTER
5/23/2023         RE: Anne Weber  [address] [place] [address] [place] [address]        Dear Colleague,    Thank you for referring your patient, Anne Weber, to the [facility] FOR LUNG [facility]ENCE AND [facility]. Please see a copy of my visit note below.              Pulmonary Clinic  New Patient Evaluation    Name: Anne Weber MRN: [id number]     Age: 32 year old   YOB: 1990             HPI:   CC: Cough and dyspnea    Anne Weber is a 32 year old female with H/O aortic vegetation, nonischemic cardiomyopathy status post heart transplant (on tacrolimus and CellCept, recently discontinued prednisone) who presents to discuss persistent dyspnea, cough, chest wall pain and her postoperative course.    She had a heart transplant on November 30, 2022 at [facility].  This was complicated by hemopneumothorax.  She left the hospital in late February, but was living with her mother until late April.  Around April she developed worsening pleuritic type pain in her left lower lateral chest.  The pain is approximately 4 out of 10, sharp and stabbing in nature, primarily with deep inspiration, though she does occasionally have very mild pain at rest.    Additionally she has had a productive cough for approximately 1 month and persistent left upper lobe opacity.    This worsened and included fevers and rigors prompting her to visit the ER on 5/15 and was treated with antibiotics.    She was on supplemental oxygen during her hospitalization and this was discontinued approximately 1 month ago.    Currently she can walk around such as going shopping, but has to move quite slowly and occasionally has to stop and catch her breath.  Stairs are very difficult, though she can do 1 flight of stairs before stopping.  She is currently working with PT and OT and will be starting cardiac rehab soon.    Overall she feels much improved from when she was discharged, but not back to her baseline  yet.  She feels that her breathing has been slowly improving with the exception of the probable infection that led to her ER visit on 5/15, though she feels that she has recovered to her preexacerbation baseline    She has no history of respiratory difficulty.  She was given an albuterol inhaler during her hospitalization and feels that this does provide symptomatic benefit.        Exposure History:   Tobacco: Started at age 16, quit in .  Smoked 1 pack/day for approximately 8 pack years.  Other inhaled substances (Vaping, hookah. Marijuana): Used to use MJ   Occupation: Cosmetology   Pets: Hypoallergenic dog  Allergies: Cats, dogs, dust mites, probably pollen. Tested as a child with many positives   GERD: once a month           Past Medical History:     Past Medical History:   Diagnosis Date    ADHD (attention deficit hyperactivity disorder)     Anxiety     Aortic valve vegetation 10/20/2016    Not thought to be infectious by ID    Chronic systolic heart failure (H) 2016    ICD (implantable cardioverter-defibrillator) in place 10/20/2016    Nonischemic cardiomyopathy (H)     secondary to polysubstance abuse    Polysubstance abuse (H)     Pulmonary embolism (H) 10/20/2016    Bilateral-on coumadin             Past Surgical History:      Past Surgical History:   Procedure Laterality Date    CARDIAC DEFIBRILLATOR PLACEMENT      CARDIAC SURGERY      Wire sticking out.    LEFT VENTRICULAR ASSIST DEVICE      Z INSERT ELECTRD LEADS/REPOSTION  10/12/2016                  Social History:     Social History     Socioeconomic History    Marital status:      Spouse name: Not on file    Number of children: Not on file    Years of education: Not on file    Highest education level: Not on file   Occupational History    Not on file   Tobacco Use    Smoking status: Former     Years: 9.00     Types: Cigarettes     Quit date: 6/15/2017     Years since quittin.9    Smokeless tobacco: Not on file    Tobacco  comments:     3 smokes per day.   Vaping Use    Vaping status: Not on file   Substance and Sexual Activity    Alcohol use: No    Drug use: Yes     Types: Marijuana, Heroin, Methamphetamines     Comment: smoked 2-3 days ago    Sexual activity: Yes     Partners: Male     Birth control/protection: Inserts   Other Topics Concern    Parent/sibling w/ CABG, MI or angioplasty before 65F 55M? No     Comment: adopted   Social History Narrative    Not on file     Social Determinants of Health     Financial Resource Strain: Not on file   Food Insecurity: Not on file   Transportation Needs: Not on file   Physical Activity: Not on file   Stress: Not on file   Social Connections: Not on file   Intimate Partner Violence: Not on file   Housing Stability: Not on file            Family History:   No family history on file.          Immunizations:     Immunization History   Administered Date(s) Administered    COVID-19 Monovalent 18+ (Moderna) 11/08/2021, 12/06/2021, 07/06/2022    Pneumococcal 23 valent 08/08/2016             Allergies:     Allergies   Allergen Reactions    Amoxicillin Hives     Per previous records    Ceclor [Cefaclor] Hives     Per previous records    Clindamycin      THROWING UP             Medications:   acetaminophen (TYLENOL) 325 MG tablet, Take 1 tablet (325 mg) by mouth every 4 hours as needed for mild pain or fever  ascorbic acid (VITAMIN C) 500 MG tablet, Take 2 tablets (1,000 mg) by mouth daily  Blood Pressure Monitor KIT, 1 each daily  bumetanide (BUMEX) 0.25 MG/ML infusion, Inject 1.5 mg/hr into the vein continuous  busPIRone 30 MG TABS, Take 30 mg by mouth 3 times daily  Cholecalciferol (VITAMIN D) 2000 UNITS tablet, Take 2,000 Units by mouth daily  Cyanocobalamin 1000 MCG CAPS, Take 1,000 Units by mouth daily  cyclobenzaprine (FLEXERIL) 10 MG tablet, Take 1 tablet (10 mg) by mouth 3 times daily as needed for muscle spasms  D5W 5 % SOLN 170 mL with DOBUTamine 250 MG/20ML SOLN 1,000 mg, Inject 0.36 mg/min  into the vein continuous  digoxin (LANOXIN) 125 MCG tablet, Take 1 tablet (125 mcg) by mouth daily  DULoxetine (CYMBALTA) 30 MG EC capsule, 3 capsules (90 mg) by Oral or Feeding Tube route daily  ferrous sulfate (IRON) 325 (65 FE) MG tablet, Take 1 tablet (325 mg) by mouth 2 times daily  hydrALAZINE (APRESOLINE) 100 MG TABS tablet, Take 1 tablet (100 mg) by mouth 4 times daily  hydrOXYzine (ATARAX) 50 MG tablet, Take 1 tablet (50 mg) by mouth 3 times daily  isosorbide dinitrate (ISORDIL) 5 MG tablet, Take 6 tablets (30 mg) by mouth 3 times daily  melatonin 3 MG tablet, Take 1 tablet (3 mg) by mouth nightly as needed for sleep (Patient taking differently: Take 3 mg by mouth At Bedtime )  multivitamin, therapeutic with minerals (THERA-VIT-M) TABS, Take 1 tablet by mouth daily  nitroprusside (NIPRIDE) 0.4 mg/mL, sodium thiosulfate 4 mg/mL in D5W 125 mL infusion, Inject 36.5 mcg/min into the vein continuous  ondansetron (ZOFRAN-ODT) 4 MG ODT tab, Take 1 tablet (4 mg) by mouth every 6 hours as needed for nausea  pantoprazole (PROTONIX) 40 MG EC tablet, Take 1 tablet (40 mg) by mouth every morning  polyethylene glycol (MIRALAX/GLYCOLAX) packet, Take 1 packet by mouth daily   Potassium Chloride ER 20 MEQ TBCR, Take 3 tablets (60 mEq) by mouth 2 times daily  sodium chloride, PF, 0.9% PF flush, 3 mLs by Intracatheter route every hour as needed for line flush  thiamine 100 MG tablet, Take 1 tablet (100 mg) by mouth daily  warfarin (COUMADIN) 2 MG tablet, Take 6mg on 1/2, then 4mg daily until you get your INR checked on 1/4 or 1/5.    No current facility-administered medications on file prior to visit.           Review of Systems:     Review of Systems   Constitutional: Negative for chills, fever and weight loss.   Respiratory: Positive for cough, sputum production and shortness of breath. Negative for hemoptysis.    Cardiovascular: Positive for chest pain.              Exam:   /65   Pulse 79   Resp 17   Ht 1.6 m (5'  "3\")   SpO2 97%   BMI 32.91 kg/m      Physical Exam  Vitals and nursing note reviewed.   Constitutional:       Appearance: She is obese.   Cardiovascular:      Rate and Rhythm: Normal rate and regular rhythm.   Pulmonary:      Effort: Pulmonary effort is normal.      Comments: Faint rhonchi in the left lower lobe  Skin:     General: Skin is warm and dry.   Neurological:      Mental Status: She is alert.       Fingernails without clubbing         Data:     PFT 5/23/2023      The FVC and FEV1 are reduced, the FEV1/FVC ratio is within normal limits.  The RV is within normal limits, the TLC is reduced, the RV/TLC ratio is elevated.  The diffusion capacity is reduced, however the diffusion capacity is not corrected for the patient's hemoglobin.    IMPRESSION:  Severe restriction with severe diffusion deficit      PFT 7/17/17 ([facility])            Procalcitonin 5/15/2023  0.05      All studies listed here were independently reviewed and interpreted by me today.          Assessment and Plan:     ## Productive cough, chronic  ## Dyspnea on exertion  ## Persistent left upper lobe opacity  ## Restriction on PFTs  ## Diffusion defect  ## Recent pneumonia treated with antibiotics  Her symptoms are likely largely due to her complicated surgery with heart transplant followed by tricuspid valve repair complicated by hemopneumothorax.  Her PFTs reveal restriction which is likely multifactorial from thoracotomy, cardiomegaly, and possible trapped lung.  Diffusion defect is at least partially due to anemia, though pulmonary infiltrates in the setting of recent pneumonia could also be contributing.  She is already working with PT and OT, and will soon be starting cardiac rehab.  At this point, I think we need to reevaluate with imaging.  This was discussed.  The patient has already had a long day and is not interested in remaining here for additional imaging as she has a long drive home, but will be back in town next week for an " appointment with her cardiologist.  She will talk with her transplant team and have a CT scan ordered after which she will contact us.  -Continue as needed albuterol  -Sputum culture ordered and faxed to her local healthcare facility        Return to clinic: 3 months    I personally spent 74 minutes on the date of the encounter doing chart review, history and exam, documentation and further activities per the note.    Jam Diaz M.D.  Pulmonary & Critical Care  Pager: Click Here to page      The above note was dictated using voice recognition software and may include typographical errors. Please contact the author for any clarifications.

## 2023-05-24 ENCOUNTER — TELEPHONE (OUTPATIENT)
Dept: PULMONOLOGY | Facility: CLINIC | Age: 33
End: 2023-05-24
Payer: MEDICARE

## 2023-05-24 ASSESSMENT — ENCOUNTER SYMPTOMS
COUGH: 1
FEVER: 0
CHILLS: 0
WEIGHT LOSS: 0
HEMOPTYSIS: 0
SHORTNESS OF BREATH: 1
SPUTUM PRODUCTION: 1

## 2023-05-24 NOTE — TELEPHONE ENCOUNTER
[facility] Call Center    Phone Message    May a detailed message be left on voicemail: yes     Reason for Call: Order(s): Other:   Reason for requested: Respiratory Aerobic Bacterial Culture with Gram Stain  Date needed: asap  Provider name: Dr Diaz    [facility] and there is no order   Fax [phone]      Action Taken: Message routed to:  Other: pulm    Travel Screening: Not Applicable

## 2023-05-24 NOTE — TELEPHONE ENCOUNTER
Fax cover sheet, demographic sheet and sputum culture orders faxed to Lab at [facility] [phone] with instructions for results to be faxed back to ATTN Dr. Diaz

## 2023-05-25 ENCOUNTER — TELEPHONE (OUTPATIENT)
Dept: PULMONOLOGY | Facility: CLINIC | Age: 33
End: 2023-05-25
Payer: MEDICARE

## 2023-05-25 DIAGNOSIS — I50.22 CHRONIC SYSTOLIC HEART FAILURE (H): ICD-10-CM

## 2023-05-25 DIAGNOSIS — J18.1 CONSOLIDATION LUNG (H): Primary | ICD-10-CM

## 2023-05-25 LAB
DLCOUNC-%PRED-PRE: 52 %
DLCOUNC-PRE: 11.06 ML/MIN/MMHG
DLCOUNC-PRED: 20.97 ML/MIN/MMHG
ERV-%PRED-PRE: 40 %
ERV-PRE: 0.48 L
ERV-PRED: 1.18 L
EXPTIME-PRE: 5.3 SEC
FEF2575-%PRED-PRE: 44 %
FEF2575-PRE: 1.46 L/SEC
FEF2575-PRED: 3.25 L/SEC
FEFMAX-%PRED-PRE: 66 %
FEFMAX-PRE: 4.48 L/SEC
FEFMAX-PRED: 6.7 L/SEC
FEV1-%PRED-PRE: 49 %
FEV1-PRE: 1.39 L
FEV1FEV6-PRE: 83 %
FEV1FEV6-PRED: 85 %
FEV1FVC-PRE: 84 %
FEV1FVC-PRED: 86 %
FEV1SVC-PRE: 84 %
FEV1SVC-PRED: 75 %
FIFMAX-PRE: 3.95 L/SEC
FRCPLETH-%PRED-PRE: 57 %
FRCPLETH-PRE: 1.36 L
FRCPLETH-PRED: 2.37 L
FVC-%PRED-PRE: 51 %
FVC-PRE: 1.66 L
FVC-PRED: 3.25 L
IC-%PRED-PRE: 48 %
IC-PRE: 1.18 L
IC-PRED: 2.41 L
RVPLETH-%PRED-PRE: 80 %
RVPLETH-PRE: 0.88 L
RVPLETH-PRED: 1.09 L
TLCPLETH-%PRED-PRE: 52 %
TLCPLETH-PRE: 2.54 L
TLCPLETH-PRED: 4.8 L
VA-%PRED-PRE: 54 %
VA-PRE: 2.45 L
VC-%PRED-PRE: 44 %
VC-PRE: 1.66 L
VC-PRED: 3.71 L

## 2023-05-25 NOTE — TELEPHONE ENCOUNTER
Health Call Center    Phone Message    May a detailed message be left on voicemail: yes     Reason for Call: Order(s): Other:     Reason for requested: Per Maritza states the [facility] would not be able to schedule the Lung Scan for patient. Maritza states they had recommended patient to have the Lung Scan at the [facility] at Phone: [phone]. Maritza is wanting to get a call back when this has been done. Please advise    Date needed: asap    Provider name: Emily      Action Taken: Message routed to:  [facility] Lung    Travel Screening: Not Applicable

## 2023-05-25 NOTE — CONFIDENTIAL NOTE
Order for chest CT without contrast ordered per Dr. Diaz.  Writer will FAX to [facility] in [facility] as patient's mom requested once Dr. Diaz has signed the order. FAX:[phone]

## 2023-06-03 ENCOUNTER — HEALTH MAINTENANCE LETTER (OUTPATIENT)
Age: 33
End: 2023-06-03

## 2024-07-13 ENCOUNTER — HEALTH MAINTENANCE LETTER (OUTPATIENT)
Age: 34
End: 2024-07-13

## 2025-07-19 ENCOUNTER — HEALTH MAINTENANCE LETTER (OUTPATIENT)
Age: 35
End: 2025-07-19

## (undated) RX ORDER — FUROSEMIDE 10 MG/ML
INJECTION INTRAMUSCULAR; INTRAVENOUS
Status: DISPENSED
Start: 2017-01-06